# Patient Record
Sex: FEMALE | Race: WHITE | NOT HISPANIC OR LATINO | Employment: PART TIME | URBAN - METROPOLITAN AREA
[De-identification: names, ages, dates, MRNs, and addresses within clinical notes are randomized per-mention and may not be internally consistent; named-entity substitution may affect disease eponyms.]

---

## 2017-02-21 ENCOUNTER — APPOINTMENT (OUTPATIENT)
Dept: GENERAL RADIOLOGY | Facility: CLINIC | Age: 32
End: 2017-02-21
Attending: PHYSICIAN ASSISTANT
Payer: MEDICAID

## 2017-02-21 ENCOUNTER — HOSPITAL ENCOUNTER (EMERGENCY)
Facility: CLINIC | Age: 32
Discharge: HOME | End: 2017-02-21
Attending: PHYSICIAN ASSISTANT | Admitting: PHYSICIAN ASSISTANT
Payer: MEDICAID

## 2017-02-21 VITALS
BODY MASS INDEX: 27 KG/M2 | HEART RATE: 94 BPM | TEMPERATURE: 98.9 F | RESPIRATION RATE: 16 BRPM | WEIGHT: 168 LBS | OXYGEN SATURATION: 98 % | SYSTOLIC BLOOD PRESSURE: 129 MMHG | DIASTOLIC BLOOD PRESSURE: 107 MMHG | HEIGHT: 66 IN

## 2017-02-21 DIAGNOSIS — J06.9 VIRAL URI WITH COUGH: ICD-10-CM

## 2017-02-21 LAB
BASOPHILS # BLD AUTO: 0 10E9/L (ref 0–0.2)
BASOPHILS NFR BLD AUTO: 0.5 %
CRP SERPL-MCNC: <2.9 MG/L (ref 0–8)
DIFFERENTIAL METHOD BLD: NORMAL
EOSINOPHIL # BLD AUTO: 0.2 10E9/L (ref 0–0.7)
EOSINOPHIL NFR BLD AUTO: 2.2 %
ERYTHROCYTE [DISTWIDTH] IN BLOOD BY AUTOMATED COUNT: 13.4 % (ref 10–15)
FLUAV+FLUBV AG SPEC QL: NEGATIVE
FLUAV+FLUBV AG SPEC QL: NORMAL
HCT VFR BLD AUTO: 43.6 % (ref 35–47)
HGB BLD-MCNC: 14.8 G/DL (ref 11.7–15.7)
IMM GRANULOCYTES # BLD: 0 10E9/L (ref 0–0.4)
IMM GRANULOCYTES NFR BLD: 0.2 %
LYMPHOCYTES # BLD AUTO: 2.1 10E9/L (ref 0.8–5.3)
LYMPHOCYTES NFR BLD AUTO: 24.3 %
MCH RBC QN AUTO: 32.2 PG (ref 26.5–33)
MCHC RBC AUTO-ENTMCNC: 33.9 G/DL (ref 31.5–36.5)
MCV RBC AUTO: 95 FL (ref 78–100)
MONOCYTES # BLD AUTO: 0.6 10E9/L (ref 0–1.3)
MONOCYTES NFR BLD AUTO: 7.4 %
NEUTROPHILS # BLD AUTO: 5.5 10E9/L (ref 1.6–8.3)
NEUTROPHILS NFR BLD AUTO: 65.4 %
PLATELET # BLD AUTO: 307 10E9/L (ref 150–450)
RBC # BLD AUTO: 4.59 10E12/L (ref 3.8–5.2)
SPECIMEN SOURCE: NORMAL
WBC # BLD AUTO: 8.5 10E9/L (ref 4–11)

## 2017-02-21 PROCEDURE — 86140 C-REACTIVE PROTEIN: CPT | Performed by: PHYSICIAN ASSISTANT

## 2017-02-21 PROCEDURE — 71020 XR CHEST 2 VW: CPT | Mod: TC

## 2017-02-21 PROCEDURE — 85025 COMPLETE CBC W/AUTO DIFF WBC: CPT | Performed by: PHYSICIAN ASSISTANT

## 2017-02-21 PROCEDURE — 99284 EMERGENCY DEPT VISIT MOD MDM: CPT | Performed by: PHYSICIAN ASSISTANT

## 2017-02-21 PROCEDURE — 99284 EMERGENCY DEPT VISIT MOD MDM: CPT | Mod: 25

## 2017-02-21 PROCEDURE — 87804 INFLUENZA ASSAY W/OPTIC: CPT | Performed by: PHYSICIAN ASSISTANT

## 2017-02-21 RX ORDER — ALBUTEROL SULFATE 90 UG/1
2 AEROSOL, METERED RESPIRATORY (INHALATION) EVERY 4 HOURS PRN
Qty: 1 INHALER | Refills: 0 | Status: ON HOLD | OUTPATIENT
Start: 2017-02-21 | End: 2023-05-20

## 2017-02-21 RX ORDER — PREDNISONE 20 MG/1
TABLET ORAL
Qty: 10 TABLET | Refills: 0 | Status: SHIPPED | OUTPATIENT
Start: 2017-02-21 | End: 2022-01-19

## 2017-02-21 ASSESSMENT — ENCOUNTER SYMPTOMS
VOMITING: 1
FEVER: 0
COUGH: 1
HEADACHES: 1

## 2017-02-21 NOTE — ED AVS SNAPSHOT
[facility] Emergency Department    [address] [place] [address]    Phone:  [phone]    Fax:  [phone]                                       Heidi Su   MRN: [id number]    Department:  [facility] Emergency Department   Date of Visit:  2/21/2017           After Visit Summary Signature Page     I have received my discharge instructions, and my questions have been answered. I have discussed any challenges I see with this plan with the nurse or doctor.    ..........................................................................................................................................  Patient/Patient Representative Signature      ..........................................................................................................................................  Patient Representative Print Name and Relationship to Patient    ..................................................               ................................................  Date                                            Time    ..........................................................................................................................................  Reviewed by Signature/Title    ...................................................              ..............................................  Date                                                            Time

## 2017-02-21 NOTE — LETTER
[facility] EMERGENCY DEPARTMENT  [address] [place] [address]  [phone]    2017    Heidi Su  [address] [address] [address]  [phone] (home) NONE (work)    : 1985      To Whom it may concern:    Heidi Su was seen in our Emergency Department today, 2017.  I expect her condition to improve over the next few days.  Please excuse her from work on 2017 and 2017 due to this illness.        Sincerely,            Drake Cheek PA-C

## 2017-02-21 NOTE — ED NOTES
She was put on antibiotics for her cough 2 weeks ago and it's not getting better.  She vomits from phlegm when she lays down. She is also complaining of body aches.

## 2017-02-21 NOTE — ED PROVIDER NOTES
History     Chief Complaint   Patient presents with     Cough     The history is provided by the patient.     Heidi Su is a 31 year old female who presents to the ED with a cough. Patient states she has been sick for awhile. It first started on September, 5 months ago and resolved 2 weeks later on it's own. It came back again in November and resolved again after a couple of weeks. It came back a third time at the end of January and has continued for the last 4 weeks. She complains of a productive cough, coughing up yellow mucous. The cough is associated with vomiting. She has had hot and cold sweats but no fever noted. She also has body aches, headache and chest pain. She was seen in January and was tested for whooping cough that came back negative. She was then started on Zithromax with no improvement. She is a smoker, smoking 0.5 pack a day. 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 No recent prolonged travel. No fever noted. She did not get the influenza vaccine. She has a history of pneumonia.     Patient Active Problem List   Diagnosis     CARDIOVASCULAR SCREENING; LDL GOAL LESS THAN 160     Past Medical History   Diagnosis Date     Anxiety      Bronchitis      Depressive disorder      NO ACTIVE PROBLEMS      Ovarian cyst        Past Surgical History   Procedure Laterality Date     Gyn surgery       Dilation and curettage       Cystectomy ovarian benign       Hemorrhoid surgery       Dilation and curettage  2011       Family History   Problem Relation Age of Onset     Asthma Sister      DIABETES Sister 14     CEREBROVASCULAR DISEASE Maternal Grandfather      Breast Cancer Maternal Grandfather        Social History   Substance Use Topics     Smoking status: Current Every Day Smoker     Packs/day: 0.50     Smokeless tobacco: Never Used     Alcohol use Yes      Comment: 1-2x/month          There is no immunization history on file for this patient.     No Known  "Allergies    Current Outpatient Prescriptions   Medication Sig Dispense Refill     predniSONE (DELTASONE) 20 MG tablet Take two tablets (= 40mg) each day for 5 (five) days 10 tablet 0     albuterol (ALBUTEROL) 108 (90 BASE) MCG/ACT Inhaler Inhale 2 puffs into the lungs every 4 hours as needed for shortness of breath / dyspnea 1 Inhaler 0     ibuprofen (ADVIL,MOTRIN) 200 MG tablet Take 4 tablets (800 mg) by mouth every 8 hours as needed for pain (with food) 120 tablet      albuterol (2.5 MG/3ML) 0.083% nebulizer solution Take 3 mLs by nebulization every 4 hours as needed for shortness of breath / dyspnea (coughing jags). 1 Box 0     acetaminophen (TYLENOL) 500 MG tablet Take 1-2 tablets by mouth every 6 hours as needed.             I have reviewed the Medications, Allergies, Past Medical and Surgical History, and Social History in the Epic system.    Review of Systems   Constitutional: Negative for fever.        Positive for hot and cold sweats.   Respiratory: Positive for cough (yellow mucos ).         She is a smoker.    Cardiovascular: Positive for chest pain.   Gastrointestinal: Positive for vomiting (associated with the cough).   Musculoskeletal:        Positive for body aches.    Neurological: Positive for headaches.   All other systems reviewed and are negative.      Physical Exam   BP: (!) 129/107  Pulse: 94  Temp: 98.9  F (37.2  C)  Resp: 18  Height: 167.6 cm (5' 6\")  Weight: 76.2 kg (168 lb)  SpO2: 98 %  Physical Exam  Mildly ill appearing female in NAD who is active and non-toxic appearing.   Skin:  No rashes or lesions are noted on inspection of the torso, face, and upper extremities.   Head: Normocephalic, atraumatic, nontender to palpation  Eyes: PERRLA, conjunctiva and sclera clear  Ears: Bilateral TM's and canals are clear.  TM's translucent without erythema or effusion.  Nose: Nares normal and patent bilaterally.  Mucous membranes are non-erythematous and non-edematous.  No sinus tenderness.  Throat: " Mucous membranes are clear.  No tonsilar hypertrophy, exudate, or erythema.  Neck: Supple.  FROM without pain.  No adenopathy.  No thyromegaly.   Heart:  RRR with normal S1 and S2.  No S3 or S4.  No murmur, rub, gallop, or click.  PMI is nondisplaced.   Lungs:  CTA bilaterally without wheezes, rales, or rhonchi.  Good breath sounds heard throughout all lung fields.  Tympanitic to percussion with no areas of dullness.   Abdomen: Positive bowel sounds in all four quadrants.  No tenderness to palpation throughout.  No rebound and no guarding.  No hepatosplenomegaly.  No masses.   Extremities: extremities, peripheral pulses and reflexes normal, no edema, redness or tenderness in the calves or thighs, Tico's sign is negative, no sign of DVT.           ED Course     ED Course     Procedures             Critical Care time:  none         Results for orders placed or performed during the hospital encounter of 02/21/17   XR Chest 2 Views    Narrative    CHEST TWO VIEWS    2/21/2017 3:53 PM     HISTORY: Cough x four weeks.    COMPARISON: Chest x-rays dated 10/26/2012.    FINDINGS: The lungs are clear. No pleural effusions or pneumothorax.  Heart size and pulmonary vascularity are within normal limits. No  acute fracture.      Impression    IMPRESSION: No evidence of acute cardiopulmonary disease is seen.    KANDICE MCKEON MD   CBC with platelets differential   Result Value Ref Range    WBC 8.5 4.0 - 11.0 10e9/L    RBC Count 4.59 3.8 - 5.2 10e12/L    Hemoglobin 14.8 11.7 - 15.7 g/dL    Hematocrit 43.6 35.0 - 47.0 %    MCV 95 78 - 100 fl    MCH 32.2 26.5 - 33.0 pg    MCHC 33.9 31.5 - 36.5 g/dL    RDW 13.4 10.0 - 15.0 %    Platelet Count 307 150 - 450 10e9/L    Diff Method Automated Method     % Neutrophils 65.4 %    % Lymphocytes 24.3 %    % Monocytes 7.4 %    % Eosinophils 2.2 %    % Basophils 0.5 %    % Immature Granulocytes 0.2 %    Absolute Neutrophil 5.5 1.6 - 8.3 10e9/L    Absolute Lymphocytes 2.1 0.8 - 5.3 10e9/L     Absolute Monocytes 0.6 0.0 - 1.3 10e9/L    Absolute Eosinophils 0.2 0.0 - 0.7 10e9/L    Absolute Basophils 0.0 0.0 - 0.2 10e9/L    Abs Immature Granulocytes 0.0 0 - 0.4 10e9/L   CRP inflammation   Result Value Ref Range    CRP Inflammation <2.9 0.0 - 8.0 mg/L   Influenza A/B antigen   Result Value Ref Range    Influenza A/B Agn Specimen Nasopharyngeal     Influenza A Negative NEG    Influenza B  NEG     Negative   Test results must be correlated with clinical data. If necessary, results   should be confirmed by a molecular assay or viral culture.            Results for orders placed or performed during the hospital encounter of 02/21/17 (from the past 24 hour(s))   Influenza A/B antigen   Result Value Ref Range    Influenza A/B Agn Specimen Nasopharyngeal     Influenza A Negative NEG    Influenza B  NEG     Negative   Test results must be correlated with clinical data. If necessary, results   should be confirmed by a molecular assay or viral culture.     CBC with platelets differential   Result Value Ref Range    WBC 8.5 4.0 - 11.0 10e9/L    RBC Count 4.59 3.8 - 5.2 10e12/L    Hemoglobin 14.8 11.7 - 15.7 g/dL    Hematocrit 43.6 35.0 - 47.0 %    MCV 95 78 - 100 fl    MCH 32.2 26.5 - 33.0 pg    MCHC 33.9 31.5 - 36.5 g/dL    RDW 13.4 10.0 - 15.0 %    Platelet Count 307 150 - 450 10e9/L    Diff Method Automated Method     % Neutrophils 65.4 %    % Lymphocytes 24.3 %    % Monocytes 7.4 %    % Eosinophils 2.2 %    % Basophils 0.5 %    % Immature Granulocytes 0.2 %    Absolute Neutrophil 5.5 1.6 - 8.3 10e9/L    Absolute Lymphocytes 2.1 0.8 - 5.3 10e9/L    Absolute Monocytes 0.6 0.0 - 1.3 10e9/L    Absolute Eosinophils 0.2 0.0 - 0.7 10e9/L    Absolute Basophils 0.0 0.0 - 0.2 10e9/L    Abs Immature Granulocytes 0.0 0 - 0.4 10e9/L   CRP inflammation   Result Value Ref Range    CRP Inflammation <2.9 0.0 - 8.0 mg/L   XR Chest 2 Views    Narrative    CHEST TWO VIEWS    2/21/2017 3:53 PM     HISTORY: Cough x four  weeks.    COMPARISON: Chest x-rays dated 10/26/2012.    FINDINGS: The lungs are clear. No pleural effusions or pneumothorax.  Heart size and pulmonary vascularity are within normal limits. No  acute fracture.      Impression    IMPRESSION: No evidence of acute cardiopulmonary disease is seen.    KANDICE MCKEON MD     Medications - No data to display      Assessments & Plan (with Medical Decision Making)  Viral URI with cough   Heidi Su is a 31 year old female smoker who presents to ED with complaints of a cough, body aches, headache, vomiting and hot/cold sweats for the last 4 weeks, which is not improving with azithromycin therapy. She was started on Zithromax in January, 1 month ago with no improvement. Her vitals were stable upon arrival. Exam shows no significant abnormalities. Laboratory results show a normal white blood cell count with normal differential. CRP is undetectable. Influenza swabs negative. Chest x-ray negative for consolidation. Given her normal exam and normal lab/x-ray studies, I do not think antibiotic therapy is indicated. She has already undergone adequate antibiotic therapy for bronchitis/pneumonia in the form of azithromycin without any significant improvement in her symptoms. It appears that she has a viral etiology for her symptoms currently. Anti-inflammatory treatment with prednisone at 40 mg once daily for 5 days recommended. Albuterol MDI to be administered every 4 hours on a regular basis for 3 days and then p.r.n. afterwards discussed with her. She certainly could have an underlying reactive airway disease component to her recurrent upper respiratory symptoms given her smoking history. We had a long conversation regarding smoking cessation and the importance of this for her long-term health. Return for repeat evaluation if symptoms worsening or not improving. Possible side effects of the medication were discussed with her. The patient was in agreement with this plan and was  suitable for discharge.      I have reviewed the nursing notes.    I have reviewed the findings, diagnosis, plan and need for follow up with the patient.    Discharge Medication List as of 2/21/2017  5:28 PM      START taking these medications    Details   predniSONE (DELTASONE) 20 MG tablet Take two tablets (= 40mg) each day for 5 (five) days, Disp-10 tablet, R-0, E-Prescribe      albuterol (ALBUTEROL) 108 (90 BASE) MCG/ACT Inhaler Inhale 2 puffs into the lungs every 4 hours as needed for shortness of breath / dyspnea, Disp-1 Inhaler, R-0, E-Prescribe             Final diagnoses:   Viral URI with cough   This document serves as a record of services personally performed by Drake Cheek PA*. It was created on their behalf by Kat King, a trained medical scribe. The creation of this record is based on the provider's personal observations and the statements of the patient. This document has been checked and approved by the attending provider.   Note: Chart documentation done in part with Dragon Voice Recognition software. Although reviewed after completion, some word and grammatical errors may remain.        2/21/2017   Drake Cheek PA-C   [facility] EMERGENCY DEPARTMENT     Drake Cheek PA-C  02/22/17 0054

## 2017-02-21 NOTE — ED AVS SNAPSHOT
[facility] Emergency Department    [address] [place] [address]    Phone:  [phone]    Fax:  [phone]                                       Heidi Su   MRN: [id number]    Department:  [facility] Emergency Department   Date of Visit:  2/21/2017           Patient Information     Date Of Birth          1985        Your diagnoses for this visit were:     Viral URI with cough        You were seen by Drake Cheek PA-C.      Follow-up Information     Follow up with [facility] Emergency Department.    Specialty:  EMERGENCY MEDICINE    Why:  As needed, If symptoms worsen    Contact information:    [address] [place] [address] [phone]    Additional information:    From [address]: Exit at "[address] on south side of [place]. Turn right on [address]. Turn left at stoplight on [address]. [facility] will be in view two blocks ahead        Discharge Instructions       1.  Please set up an appointment with your new primary care provider in 6 days to recheck your cough if you are not improving with the prednisone and albuterol.  2.  Please stop smoking as this will help prevent future illnesses and help you recover from illnesses faster!!        24 Hour Appointment Hotline       To make an appointment at any [facility] clinic, call [phone] ([phone]). If you don't have a family doctor or clinic, we will help you find one. [facility] clinics are conveniently located to serve the needs of you and your family.             Review of your medicines      START taking        Dose / Directions Last dose taken    predniSONE 20 MG tablet   Commonly known as:  DELTASONE   Quantity:  10 tablet        Take two tablets (= 40mg) each day for 5 (five) days   Refills:  0          CONTINUE these medicines which may have CHANGED, or have new prescriptions. If we are uncertain of the size of tablets/capsules you have at home,  strength may be listed as something that might have changed.        Dose / Directions Last dose taken    * albuterol (2.5 MG/3ML) 0.083% neb solution   Dose:  1 ampule   What changed:  Another medication with the same name was added. Make sure you understand how and when to take each.   Quantity:  1 Box        Take 3 mLs by nebulization every 4 hours as needed for shortness of breath / dyspnea (coughing jags).   Refills:  0        * albuterol 108 (90 BASE) MCG/ACT Inhaler   Commonly known as:  albuterol   Dose:  2 puff   What changed:  You were already taking a medication with the same name, and this prescription was added. Make sure you understand how and when to take each.   Quantity:  1 Inhaler        Inhale 2 puffs into the lungs every 4 hours as needed for shortness of breath / dyspnea   Refills:  0        * Notice:  This list has 2 medication(s) that are the same as other medications prescribed for you. Read the directions carefully, and ask your doctor or other care provider to review them with you.      Our records show that you are taking the medicines listed below. If these are incorrect, please call your family doctor or clinic.        Dose / Directions Last dose taken    ibuprofen 200 MG tablet   Commonly known as:  ADVIL/MOTRIN   Dose:  800 mg   Quantity:  120 tablet        Take 4 tablets (800 mg) by mouth every 8 hours as needed for pain (with food)   Refills:  0        TYLENOL 500 MG tablet   Dose:  1-2 tablet   Generic drug:  acetaminophen        Take 1-2 tablets by mouth every 6 hours as needed.   Refills:  0                Prescriptions were sent or printed at these locations (2 Prescriptions)                   [place] [address]   [address] [facility] [address]    Telephone:  [phone]   Fax:  [phone]   Hours:                  E-Prescribed (2 of 2)         predniSONE (DELTASONE) 20 MG tablet               albuterol (ALBUTEROL) 108 (90 BASE) MCG/ACT  "Inhaler                Procedures and tests performed during your visit     CBC with platelets differential    CRP inflammation    Influenza A/B antigen    XR Chest 2 Views      Orders Needing Specimen Collection     None      Pending Results     Date and Time Order Name Status Description    2017 1521 XR Chest 2 Views Preliminary             Pending Culture Results     No orders found from 2017 to 2017.            Thank you for choosing [facility]       Thank you for choosing [facility] for your care. Our goal is always to provide you with excellent care. Hearing back from our patients is one way we can continue to improve our services. Please take a few minutes to complete the written survey that you may receive in the mail after you visit with us. Thank you!        [employer] Information     [employer] lets you send messages to your doctor, view your test results, renew your prescriptions, schedule appointments and more. To sign up, go to [email or web address] . Click on \"Log in\" on the left side of the screen, which will take you to the Welcome page. Then click on \"Sign up Now\" on the right side of the page.     You will be asked to enter the access code listed below, as well as some personal information. Please follow the directions to create your username and password.     Your access code is: [email or web address]  Expires: 2017  5:28 PM     Your access code will  in 90 days. If you need help or a new code, please call your [facility] clinic or [phone].        Care EveryWhere ID     This is your Care EveryWhere ID. This could be used by other organizations to access your [facility] medical records  [id number]        After Visit Summary       This is your record. Keep this with you and show to your community pharmacist(s) and doctor(s) at your next visit.                  "

## 2017-02-21 NOTE — DISCHARGE INSTRUCTIONS
1.  Please set up an appointment with your new primary care provider in 6 days to recheck your cough if you are not improving with the prednisone and albuterol.  2.  Please stop smoking as this will help prevent future illnesses and help you recover from illnesses faster!!

## 2017-03-28 ENCOUNTER — HOSPITAL ENCOUNTER (EMERGENCY)
Facility: CLINIC | Age: 32
Discharge: HOME | End: 2017-03-28
Attending: EMERGENCY MEDICINE | Admitting: EMERGENCY MEDICINE
Payer: MEDICAID

## 2017-03-28 VITALS
TEMPERATURE: 98.8 F | RESPIRATION RATE: 16 BRPM | BODY MASS INDEX: 27.48 KG/M2 | DIASTOLIC BLOOD PRESSURE: 86 MMHG | SYSTOLIC BLOOD PRESSURE: 144 MMHG | WEIGHT: 171 LBS | HEART RATE: 108 BPM | HEIGHT: 66 IN | OXYGEN SATURATION: 99 %

## 2017-03-28 DIAGNOSIS — M54.42 CHRONIC LEFT-SIDED LOW BACK PAIN WITH LEFT-SIDED SCIATICA: ICD-10-CM

## 2017-03-28 DIAGNOSIS — G89.29 CHRONIC LEFT-SIDED LOW BACK PAIN WITH LEFT-SIDED SCIATICA: ICD-10-CM

## 2017-03-28 PROCEDURE — 99284 EMERGENCY DEPT VISIT MOD MDM: CPT | Performed by: EMERGENCY MEDICINE

## 2017-03-28 PROCEDURE — 99282 EMERGENCY DEPT VISIT SF MDM: CPT

## 2017-03-28 ASSESSMENT — ENCOUNTER SYMPTOMS
ABDOMINAL PAIN: 0
CONSTIPATION: 0
BACK PAIN: 1
DYSURIA: 0

## 2017-03-28 NOTE — ED NOTES
Pt presents low back pain radiating into left posterior buttock and leg. Pt is 8 weeks pregnant. Discectomy on 12/19/2017 done at [facility].

## 2017-03-28 NOTE — ED AVS SNAPSHOT
" [facility] Emergency Department    [address] [place] [address]    Phone:  [phone]    Fax:  [phone]                                       Heidi Su   MRN: [id number]    Department:  [facility] Emergency Department   Date of Visit:  3/28/2017           Patient Information     Date Of Birth          1985        Your diagnoses for this visit were:     Chronic left-sided low back pain with left-sided sciatica        You were seen by Benjamín Pulliam MD.      Follow-up Information     Follow up with primary care.    Why:  later this week        Discharge Instructions         Neck/Back Pain: General    Both neck and back pain are usually caused by injury to the muscles or ligaments of the spine. Sometimes the disks that separate each bone of the spine may cause pain by pressing on a nearby nerve. Back and neck pain may appear after a sudden twisting/bending force (such as in a car accident), or sometimes after a simple awkward movement. In either case, muscle spasm is often present and adds to the pain.  Acute neck and back pain usually gets better in 1 to 2 weeks. Pain related to disk disease, arthritis in the spinal joints or spinal stenosis (narrowing of the spinal canal) can become chronic and last for months or years.  Back and neck pain are common problems. Most people feel better in 1 or 2 weeks, and most of the rest in 1 to 2 months. Most people can remain active.  Symptoms  People experience and describe pain differently.    Pain can be sharp, stabbing, shooting, aching, cramping, or burning    Movement, standing, bending, lifting, sitting, or walking may worsen the pain    Pain can be localized to one spot or area, or it can be more generalized    Pain can spread or radiate upwards, downwards, to the front, or go down your arms    Muscle spasm may occur.  Cause  Most of the time \"mechanical problems\" with the muscles or spine cause the pain. it is usually " caused by an injury, whether known or not, to the muscles or ligaments. While illnesses can cause back pain, it is usually not caused by a serious illness. Pain is usually related to physical activity, whether sports, exercise, work, or normal activity. Sometimes it can occur without an identifiable cause. This can happen simply by stretching or moving wrong, without noting pain at the time. Other causes include:    Overexertion, lifting, pushing, pulling incorrectly or too aggressively.    Sudden twisting, bending or stretching from an accident (car or fall), or accidental movement.    Poor posture    Poor conditioning, lack of regular exercise    Spinal disc disease or arthritis    Stress    Pregnancy, or illness like appendicitis, bladder or kidney infection, pelvic infections   Home care    For neck pain: Use a comfortable pillow that supports the head and keeps the spine in a neutral position. The position of the head should not be tilted forward or backward.    When in bed, try to find a position of comfort. A firm mattress is best. Try lying flat on your back with pillows under your knees. You can also try lying on your side with your knees bent up towards your chest and a pillow between your knees.    At first, do not try to stretch out the sore spots. If there is a strain, it is not like the good soreness you get after exercising without an injury. In this case, stretching may make it worse.    Avoid prolonged sitting, long car rides or travel. This puts more stress on the lower back than standing or walking.    During the first 24 to 72 hours after an injury, apply an ice pack to the painful area for 20 minutes and then remove it for 20 minutes over a period of 60 to 90 minutes or several times a day. As a safety precaution, do not use a heating pad at bedtime. Sleeping with a heating pad can lead to skin burns or tissue damage.    Ice and heat therapies can be alternated. Talk with your health care  provider about the best treatment for your back or neck pain.    Therapeutic massage can help relax the back and neck muscles without stretching them.    Be aware of safe lifting methods and do not lift anything over 15 pounds until all the pain is gone.  Medications  Talk to your health care provider before using medications, especially if you have other medical problems or are taking other medicines.    You may use acetaminophen (such as Tylenol) or ibuprofen (such as Advil or Motrin) to control pain, unless another pain medicine was prescribed. If you have chronic conditions like diabetes, liver or kidney disease, stomach ulcers,  gastrointestinal bleeding, or are taking blood thinner medications.    Be careful if you are given pain medicines, narcotics, or medication for muscle spasm. They can cause drowsiness, and can affect your coordination, reflexes, and judgment. Do not drive or operate heavy machinery.  Follow-up care  Follow up with your health care provider if your symptoms do not start to improve after one week. Physical therapy or further tests may be needed.  If X-rays were taken, they will be reviewed by a radiologist. You will be notified of any new findings that may affect your care.  Call 911  Seek emergency medical care if any of the following occur:    Trouble breathing    Confusion    Very drowsy or trouble awakening    Fainting or loss of consciousness    Rapid or very slow heart rate    Loss of bowel or bladder control  When to seek medical care  Get prompt medical attention if any of the following occur:    Pain becomes worse or spreads into your arms or legs    Weakness, numbness or pain in one or both arms or legs    Numbness in the groin area    Difficulty walking    Fever of 100.4 F (38 C) or higher, or as directed by your healthcare provider    8605-7986 [employer]. [address] [place] [address], [place] [address]. All rights reserved. This information is not intended as a  substitute for professional medical care. Always follow your healthcare professional's instructions.      Recommend Lidoderm patches 2x/day as needed for pain.      24 Hour Appointment Hotline       To make an appointment at any [facility], call [phone] ([phone]). If you don't have a family doctor or clinic, we will help you find one. [facility] clinics are conveniently located to serve the needs of you and your family.             Review of your medicines      Our records show that you are taking the medicines listed below. If these are incorrect, please call your family doctor or clinic.        Dose / Directions Last dose taken    * albuterol (2.5 MG/3ML) 0.083% neb solution   Dose:  1 ampule   Quantity:  1 Box        Take 3 mLs by nebulization every 4 hours as needed for shortness of breath / dyspnea (coughing jags).   Refills:  0        * albuterol 108 (90 BASE) MCG/ACT Inhaler   Commonly known as:  albuterol   Dose:  2 puff   Quantity:  1 Inhaler        Inhale 2 puffs into the lungs every 4 hours as needed for shortness of breath / dyspnea   Refills:  0        ibuprofen 200 MG tablet   Commonly known as:  ADVIL/MOTRIN   Dose:  800 mg   Quantity:  120 tablet        Take 4 tablets (800 mg) by mouth every 8 hours as needed for pain (with food)   Refills:  0        predniSONE 20 MG tablet   Commonly known as:  DELTASONE   Quantity:  10 tablet        Take two tablets (= 40mg) each day for 5 (five) days   Refills:  0        TYLENOL 500 MG tablet   Dose:  1-2 tablet   Generic drug:  acetaminophen        Take 1-2 tablets by mouth every 6 hours as needed.   Refills:  0        * Notice:  This list has 2 medication(s) that are the same as other medications prescribed for you. Read the directions carefully, and ask your doctor or other care provider to review them with you.            Orders Needing Specimen Collection     None      Pending Results     No orders found from 3/26/2017 to 3/29/2017.           "  Pending Culture Results     No orders found from 3/26/2017 to 3/29/2017.            Thank you for choosing [facility]       Thank you for choosing [facility] for your care. Our goal is always to provide you with excellent care. Hearing back from our patients is one way we can continue to improve our services. Please take a few minutes to complete the written survey that you may receive in the mail after you visit with us. Thank you!        [email or web address] Information     [employer] lets you send messages to your doctor, view your test results, renew your prescriptions, schedule appointments and more. To sign up, go to [email or web address] . Click on \"Log in\" on the left side of the screen, which will take you to the Welcome page. Then click on \"Sign up Now\" on the right side of the page.     You will be asked to enter the access code listed below, as well as some personal information. Please follow the directions to create your username and password.     Your access code is: [email or web address]  Expires: 2017  6:28 PM     Your access code will  in 90 days. If you need help or a new code, please call your [facility] clinic or [phone].        Care EveryWhere ID     This is your Care EveryWhere ID. This could be used by other organizations to access your [facility] medical records  [id number]        After Visit Summary       This is your record. Keep this with you and show to your community pharmacist(s) and doctor(s) at your next visit.                  "

## 2017-03-28 NOTE — ED PROVIDER NOTES
History     Chief Complaint   Patient presents with     Back Pain     Radiating down left buttocks and left leg. L5 surgery two months ago.      The history is provided by the patient.     Heidi Su is a 31 year old female who presents to the ED for back pain. Patient states that the pain radiates from her lower back into her left buttock. She reports that she had a Discectomy on 12/19/17. She also had a L5 surgery two months ago. This pain has been gradually worsening over the last week and is worse when lying down. She is 8 weeks pregnant but does states during her first pregnancy she had back problems. Denies any trauma to the area. Denies vaginal bleeding, abdominal pain, bowel or bladder troubles.     Patient Active Problem List   Diagnosis     CARDIOVASCULAR SCREENING; LDL GOAL LESS THAN 160     Past Medical History:   Diagnosis Date     Anxiety      Bronchitis      Depressive disorder      NO ACTIVE PROBLEMS      Ovarian cyst        Past Surgical History:   Procedure Laterality Date     CYSTECTOMY OVARIAN BENIGN       DILATION AND CURETTAGE       DILATION AND CURETTAGE  2011     GYN SURGERY       HEMORRHOID SURGERY         Family History   Problem Relation Age of Onset     Asthma Sister      DIABETES Sister 14     CEREBROVASCULAR DISEASE Maternal Grandfather      Breast Cancer Maternal Grandfather        Social History   Substance Use Topics     Smoking status: Current Every Day Smoker     Packs/day: 0.50     Smokeless tobacco: Never Used     Alcohol use Yes      Comment: 1-2x/month          There is no immunization history on file for this patient.       Allergies   Allergen Reactions     No Known Allergies        Current Outpatient Prescriptions   Medication Sig Dispense Refill     acetaminophen (TYLENOL) 500 MG tablet Take 1-2 tablets by mouth every 6 hours as needed.       predniSONE (DELTASONE) 20 MG tablet Take two tablets (= 40mg) each day for 5 (five) days 10 tablet 0     albuterol (ALBUTEROL)  "108 (90 BASE) MCG/ACT Inhaler Inhale 2 puffs into the lungs every 4 hours as needed for shortness of breath / dyspnea 1 Inhaler 0     ibuprofen (ADVIL,MOTRIN) 200 MG tablet Take 4 tablets (800 mg) by mouth every 8 hours as needed for pain (with food) 120 tablet      albuterol (2.5 MG/3ML) 0.083% nebulizer solution Take 3 mLs by nebulization every 4 hours as needed for shortness of breath / dyspnea (coughing jags). 1 Box 0         I have reviewed the Medications, Allergies, Past Medical and Surgical History, and Social History in the Epic system.    Review of Systems   Gastrointestinal: Negative for abdominal pain and constipation.   Genitourinary: Negative for dysuria and vaginal bleeding.   Musculoskeletal: Positive for back pain (low back into left posterior buttock).   All other systems reviewed and are negative.      Physical Exam   BP: 144/86  Pulse: 108  Temp: 98.8  F (37.1  C)  Resp: 16  Height: 167.6 cm (5' 6\")  Weight: 77.6 kg (171 lb)  SpO2: 99 %    Physical Exam   Constitutional: She is oriented to person, place, and time. She appears well-developed and well-nourished. No distress.   HENT:   Head: Normocephalic and atraumatic.   Eyes: Conjunctivae and EOM are normal. Pupils are equal, round, and reactive to light.   Neck: Normal range of motion.   Cardiovascular: Normal rate, regular rhythm, normal heart sounds and normal pulses.    No murmur heard.  Pulmonary/Chest: Effort normal and breath sounds normal. No respiratory distress. She has no decreased breath sounds. She has no wheezes. She has no rhonchi. She has no rales.   Abdominal: Soft. Bowel sounds are normal. She exhibits no distension. There is no rebound.   Musculoskeletal:        Lumbar back: She exhibits tenderness (left of incision).   Has midline lumbar incision. No signs of erythema, swelling or signs of infection.   Neurological: She is alert and oriented to person, place, and time. GCS eye subscore is 4. GCS verbal subscore is 5. GCS motor " subscore is 6.   Strength normal in lower extremities   Skin: Skin is warm and dry. No rash noted. No pallor.   Psychiatric: She has a normal mood and affect. Her behavior is normal.   Nursing note and vitals reviewed.      ED Course     ED Course     Procedures             Critical Care time:  none               Labs Ordered and Resulted from Time of ED Arrival Up to the Time of Departure from the ED - No data to display    No results found for this or any previous visit (from the past 24 hour(s)).    Medications - No data to display    Assessments & Plan (with Medical Decision Making)  31-year-old female with a history of chronic back pain and recurrent surgery who presents with an acute exacerbation of pain.  She is currently 8 weeks pregnant by report without vaginal bleeding or abdominal pain.  She is currently using Tylenol for pain.  She has no primary care physician.  I let her know I was not comfortable prescribing more narcotics for her back pain in light of her history and current early pregnancy.  I recommended Lidoderm patches and continuing with Tylenol as needed.  She should establish primary care for further follow-up.       I have reviewed the nursing notes.    I have reviewed the findings, diagnosis, plan and need for follow up with the patient.    Discharge Medication List as of 3/28/2017  3:22 PM          Final diagnoses:   Chronic left-sided low back pain with left-sided sciatica     This document serves as a record of services personally performed by Benjamín Pulliam MD. It was created on their behalf by Vonnie Jorge, a trained medical scribe. The creation of this record is based on the provider's personal observations and the statements of the patient. This document has been checked and approved by the attending provider.    Note: Chart documentation done in part with Dragon Voice Recognition software. Although reviewed after completion, some word and grammatical errors may  remain.        3/28/2017   [facility] EMERGENCY DEPARTMENT     Benjamín Pulliam MD  03/28/17 2020

## 2017-03-28 NOTE — ED AVS SNAPSHOT
[facility] Emergency Department    [address] [place] [address]    Phone:  [phone]    Fax:  [phone]                                       Heidi Su   MRN: [id number]    Department:  [facility] Emergency Department   Date of Visit:  3/28/2017           After Visit Summary Signature Page     I have received my discharge instructions, and my questions have been answered. I have discussed any challenges I see with this plan with the nurse or doctor.    ..........................................................................................................................................  Patient/Patient Representative Signature      ..........................................................................................................................................  Patient Representative Print Name and Relationship to Patient    ..................................................               ................................................  Date                                            Time    ..........................................................................................................................................  Reviewed by Signature/Title    ...................................................              ..............................................  Date                                                            Time

## 2017-03-28 NOTE — DISCHARGE INSTRUCTIONS
"  Neck/Back Pain: General    Both neck and back pain are usually caused by injury to the muscles or ligaments of the spine. Sometimes the disks that separate each bone of the spine may cause pain by pressing on a nearby nerve. Back and neck pain may appear after a sudden twisting/bending force (such as in a car accident), or sometimes after a simple awkward movement. In either case, muscle spasm is often present and adds to the pain.  Acute neck and back pain usually gets better in 1 to 2 weeks. Pain related to disk disease, arthritis in the spinal joints or spinal stenosis (narrowing of the spinal canal) can become chronic and last for months or years.  Back and neck pain are common problems. Most people feel better in 1 or 2 weeks, and most of the rest in 1 to 2 months. Most people can remain active.  Symptoms  People experience and describe pain differently.    Pain can be sharp, stabbing, shooting, aching, cramping, or burning    Movement, standing, bending, lifting, sitting, or walking may worsen the pain    Pain can be localized to one spot or area, or it can be more generalized    Pain can spread or radiate upwards, downwards, to the front, or go down your arms    Muscle spasm may occur.  Cause  Most of the time \"mechanical problems\" with the muscles or spine cause the pain. it is usually caused by an injury, whether known or not, to the muscles or ligaments. While illnesses can cause back pain, it is usually not caused by a serious illness. Pain is usually related to physical activity, whether sports, exercise, work, or normal activity. Sometimes it can occur without an identifiable cause. This can happen simply by stretching or moving wrong, without noting pain at the time. Other causes include:    Overexertion, lifting, pushing, pulling incorrectly or too aggressively.    Sudden twisting, bending or stretching from an accident (car or fall), or accidental movement.    Poor posture    Poor conditioning, " lack of regular exercise    Spinal disc disease or arthritis    Stress    Pregnancy, or illness like appendicitis, bladder or kidney infection, pelvic infections   Home care    For neck pain: Use a comfortable pillow that supports the head and keeps the spine in a neutral position. The position of the head should not be tilted forward or backward.    When in bed, try to find a position of comfort. A firm mattress is best. Try lying flat on your back with pillows under your knees. You can also try lying on your side with your knees bent up towards your chest and a pillow between your knees.    At first, do not try to stretch out the sore spots. If there is a strain, it is not like the good soreness you get after exercising without an injury. In this case, stretching may make it worse.    Avoid prolonged sitting, long car rides or travel. This puts more stress on the lower back than standing or walking.    During the first 24 to 72 hours after an injury, apply an ice pack to the painful area for 20 minutes and then remove it for 20 minutes over a period of 60 to 90 minutes or several times a day. As a safety precaution, do not use a heating pad at bedtime. Sleeping with a heating pad can lead to skin burns or tissue damage.    Ice and heat therapies can be alternated. Talk with your health care provider about the best treatment for your back or neck pain.    Therapeutic massage can help relax the back and neck muscles without stretching them.    Be aware of safe lifting methods and do not lift anything over 15 pounds until all the pain is gone.  Medications  Talk to your health care provider before using medications, especially if you have other medical problems or are taking other medicines.    You may use acetaminophen (such as Tylenol) or ibuprofen (such as Advil or Motrin) to control pain, unless another pain medicine was prescribed. If you have chronic conditions like diabetes, liver or kidney disease, stomach  ulcers,  gastrointestinal bleeding, or are taking blood thinner medications.    Be careful if you are given pain medicines, narcotics, or medication for muscle spasm. They can cause drowsiness, and can affect your coordination, reflexes, and judgment. Do not drive or operate heavy machinery.  Follow-up care  Follow up with your health care provider if your symptoms do not start to improve after one week. Physical therapy or further tests may be needed.  If X-rays were taken, they will be reviewed by a radiologist. You will be notified of any new findings that may affect your care.  Call 911  Seek emergency medical care if any of the following occur:    Trouble breathing    Confusion    Very drowsy or trouble awakening    Fainting or loss of consciousness    Rapid or very slow heart rate    Loss of bowel or bladder control  When to seek medical care  Get prompt medical attention if any of the following occur:    Pain becomes worse or spreads into your arms or legs    Weakness, numbness or pain in one or both arms or legs    Numbness in the groin area    Difficulty walking    Fever of 100.4 F (38 C) or higher, or as directed by your healthcare provider    9874-9297 [employer]. [address] [place] [address]. All rights reserved. This information is not intended as a substitute for professional medical care. Always follow your healthcare professional's instructions.      Recommend Lidoderm patches 2x/day as needed for pain.

## 2019-04-29 ENCOUNTER — HOSPITAL ENCOUNTER (EMERGENCY)
Facility: CLINIC | Age: 34
Discharge: HOME | End: 2019-04-29
Attending: EMERGENCY MEDICINE | Admitting: EMERGENCY MEDICINE

## 2019-04-29 VITALS
OXYGEN SATURATION: 97 % | WEIGHT: 154 LBS | HEIGHT: 65 IN | TEMPERATURE: 98 F | RESPIRATION RATE: 18 BRPM | SYSTOLIC BLOOD PRESSURE: 113 MMHG | DIASTOLIC BLOOD PRESSURE: 67 MMHG | BODY MASS INDEX: 25.66 KG/M2 | HEART RATE: 89 BPM

## 2019-04-29 DIAGNOSIS — R73.9 HYPERGLYCEMIA: ICD-10-CM

## 2019-04-29 LAB
ALBUMIN SERPL-MCNC: 4.1 G/DL (ref 3.4–5)
ALBUMIN UR-MCNC: NEGATIVE MG/DL
ALP SERPL-CCNC: 95 U/L (ref 40–150)
ALT SERPL W P-5'-P-CCNC: 26 U/L (ref 0–50)
ANION GAP SERPL CALCULATED.3IONS-SCNC: 11 MMOL/L (ref 3–14)
APPEARANCE UR: ABNORMAL
AST SERPL W P-5'-P-CCNC: 9 U/L (ref 0–45)
BACTERIA #/AREA URNS HPF: ABNORMAL /HPF
BASE DEFICIT BLDV-SCNC: 1.2 MMOL/L
BASOPHILS # BLD AUTO: 0.1 10E9/L (ref 0–0.2)
BASOPHILS NFR BLD AUTO: 0.5 %
BILIRUB SERPL-MCNC: 0.2 MG/DL (ref 0.2–1.3)
BILIRUB UR QL STRIP: NEGATIVE
BUN SERPL-MCNC: 19 MG/DL (ref 7–30)
CALCIUM SERPL-MCNC: 9.7 MG/DL (ref 8.5–10.1)
CHLORIDE SERPL-SCNC: 100 MMOL/L (ref 94–109)
CO2 SERPL-SCNC: 23 MMOL/L (ref 20–32)
COLOR UR AUTO: YELLOW
CREAT SERPL-MCNC: 0.66 MG/DL (ref 0.52–1.04)
DIFFERENTIAL METHOD BLD: NORMAL
EOSINOPHIL NFR BLD AUTO: 2.8 %
ERYTHROCYTE [DISTWIDTH] IN BLOOD BY AUTOMATED COUNT: 12.5 % (ref 10–15)
GFR SERPL CREATININE-BSD FRML MDRD: >90 ML/MIN/{1.73_M2}
GLUCOSE BLDC GLUCOMTR-MCNC: 188 MG/DL (ref 70–99)
GLUCOSE BLDC GLUCOMTR-MCNC: 257 MG/DL (ref 70–99)
GLUCOSE BLDC GLUCOMTR-MCNC: 344 MG/DL (ref 70–99)
GLUCOSE BLDC GLUCOMTR-MCNC: 41 MG/DL (ref 70–99)
GLUCOSE SERPL-MCNC: 171 MG/DL (ref 70–99)
GLUCOSE UR STRIP-MCNC: >499 MG/DL
HCG UR QL: NEGATIVE
HCO3 BLDV-SCNC: 25 MMOL/L (ref 21–28)
HCT VFR BLD AUTO: 44.7 % (ref 35–47)
HGB BLD-MCNC: 15.5 G/DL (ref 11.7–15.7)
HGB UR QL STRIP: NEGATIVE
IMM GRANULOCYTES # BLD: 0 10E9/L (ref 0–0.4)
IMM GRANULOCYTES NFR BLD: 0.2 %
KETONES UR STRIP-MCNC: 5 MG/DL
LACTATE BLD-SCNC: 2.8 MMOL/L (ref 0.7–2)
LEUKOCYTE ESTERASE UR QL STRIP: ABNORMAL
LYMPHOCYTES # BLD AUTO: 2.9 10E9/L (ref 0.8–5.3)
LYMPHOCYTES NFR BLD AUTO: 27.8 %
MCH RBC QN AUTO: 32.5 PG (ref 26.5–33)
MCHC RBC AUTO-ENTMCNC: 34.7 G/DL (ref 31.5–36.5)
MCV RBC AUTO: 94 FL (ref 78–100)
MONOCYTES # BLD AUTO: 0.6 10E9/L (ref 0–1.3)
MONOCYTES NFR BLD AUTO: 6 %
NEUTROPHILS # BLD AUTO: 6.5 10E9/L (ref 1.6–8.3)
NEUTROPHILS NFR BLD AUTO: 62.7 %
NITRATE UR QL: NEGATIVE
NRBC # BLD AUTO: 0 10*3/UL
NRBC BLD AUTO-RTO: 0 /100
O2/TOTAL GAS SETTING VFR VENT: 21 %
PCO2 BLDV: 44 MM HG (ref 40–50)
PH BLDV: 7.36 PH (ref 7.32–7.43)
PH UR STRIP: 5 PH (ref 5–7)
PLATELET # BLD AUTO: 385 10E9/L (ref 150–450)
PO2 BLDV: 35 MM HG (ref 25–47)
POTASSIUM SERPL-SCNC: 3.6 MMOL/L (ref 3.4–5.3)
PROT SERPL-MCNC: 8.5 G/DL (ref 6.8–8.8)
RBC # BLD AUTO: 4.77 10E12/L (ref 3.8–5.2)
RBC #/AREA URNS AUTO: 11 /HPF (ref 0–2)
SODIUM SERPL-SCNC: 134 MMOL/L (ref 133–144)
SOURCE: ABNORMAL
SP GR UR STRIP: 1.03 (ref 1–1.03)
SQUAMOUS #/AREA URNS AUTO: 15 /HPF (ref 0–1)
UROBILINOGEN UR STRIP-MCNC: 0 MG/DL (ref 0–2)
WBC # BLD AUTO: 10.3 10E9/L (ref 4–11)
WBC #/AREA URNS AUTO: 6 /HPF (ref 0–5)

## 2019-04-29 PROCEDURE — 83605 ASSAY OF LACTIC ACID: CPT | Performed by: EMERGENCY MEDICINE

## 2019-04-29 PROCEDURE — 25000128 H RX IP 250 OP 636: Performed by: EMERGENCY MEDICINE

## 2019-04-29 PROCEDURE — 00000146 ZZHCL STATISTIC GLUCOSE BY METER IP

## 2019-04-29 PROCEDURE — 81001 URINALYSIS AUTO W/SCOPE: CPT | Performed by: EMERGENCY MEDICINE

## 2019-04-29 PROCEDURE — 81025 URINE PREGNANCY TEST: CPT | Performed by: EMERGENCY MEDICINE

## 2019-04-29 PROCEDURE — 82803 BLOOD GASES ANY COMBINATION: CPT | Performed by: EMERGENCY MEDICINE

## 2019-04-29 PROCEDURE — 80053 COMPREHEN METABOLIC PANEL: CPT | Performed by: EMERGENCY MEDICINE

## 2019-04-29 PROCEDURE — 85025 COMPLETE CBC W/AUTO DIFF WBC: CPT | Performed by: EMERGENCY MEDICINE

## 2019-04-29 PROCEDURE — 99283 EMERGENCY DEPT VISIT LOW MDM: CPT | Mod: 25 | Performed by: EMERGENCY MEDICINE

## 2019-04-29 PROCEDURE — 87086 URINE CULTURE/COLONY COUNT: CPT | Performed by: EMERGENCY MEDICINE

## 2019-04-29 PROCEDURE — 96360 HYDRATION IV INFUSION INIT: CPT | Performed by: EMERGENCY MEDICINE

## 2019-04-29 PROCEDURE — 99283 EMERGENCY DEPT VISIT LOW MDM: CPT | Mod: Z6 | Performed by: EMERGENCY MEDICINE

## 2019-04-29 PROCEDURE — 96361 HYDRATE IV INFUSION ADD-ON: CPT | Performed by: EMERGENCY MEDICINE

## 2019-04-29 RX ORDER — SODIUM CHLORIDE 9 MG/ML
1000 INJECTION, SOLUTION INTRAVENOUS CONTINUOUS
Status: DISCONTINUED | OUTPATIENT
Start: 2019-04-29 | End: 2019-04-29 | Stop reason: HOSPADM

## 2019-04-29 RX ADMIN — SODIUM CHLORIDE 1000 ML: 9 INJECTION, SOLUTION INTRAVENOUS at 12:32

## 2019-04-29 RX ADMIN — SODIUM CHLORIDE 1000 ML: 9 INJECTION, SOLUTION INTRAVENOUS at 14:00

## 2019-04-29 ASSESSMENT — MIFFLIN-ST. JEOR: SCORE: 1414.42

## 2019-04-29 NOTE — ED AVS SNAPSHOT
[facility] Emergency Department  [address] [place] [address]  Phone:  [phone]  Fax:  [phone]                                    Heidi Su   MRN: [id number]    Department:  [facility] Emergency Department   Date of Visit:  4/29/2019           After Visit Summary Signature Page    I have received my discharge instructions, and my questions have been answered. I have discussed any challenges I see with this plan with the nurse or doctor.    ..........................................................................................................................................  Patient/Patient Representative Signature      ..........................................................................................................................................  Patient Representative Print Name and Relationship to Patient    ..................................................               ................................................  Date                                   Time    ..........................................................................................................................................  Reviewed by Signature/Title    ...................................................              ..............................................  Date                                               Time          22EPIC Rev 08/18

## 2019-04-29 NOTE — ED TRIAGE NOTES
"She was feeling funny at work so checked her blood sugar and it was 527 at 0940  She gave herself 20 units of novalog.  A 1045 she had a \"high\" reading on her glucometer so gave herself another 20  Units of novalog.  "

## 2019-04-29 NOTE — ED NOTES
Pts BS checked per pt request d/t feeling hypoglycemic. Pts BS was 41. 2 OJs given along with PB toast and a carton of milk. Pt was feeling shaky and lethargic. Will recheck sugar shortly.

## 2019-04-30 LAB
BACTERIA SPEC CULT: NORMAL
Lab: NORMAL
SPECIMEN SOURCE: NORMAL

## 2020-10-11 ENCOUNTER — HOSPITAL ENCOUNTER (EMERGENCY)
Facility: CLINIC | Age: 35
Discharge: HOME | End: 2020-10-12
Attending: FAMILY MEDICINE | Admitting: FAMILY MEDICINE
Payer: COMMERCIAL

## 2020-10-11 DIAGNOSIS — R11.0 NAUSEA: ICD-10-CM

## 2020-10-11 DIAGNOSIS — R19.7 DIARRHEA, UNSPECIFIED TYPE: ICD-10-CM

## 2020-10-11 PROCEDURE — 99284 EMERGENCY DEPT VISIT MOD MDM: CPT | Mod: 25 | Performed by: FAMILY MEDICINE

## 2020-10-11 PROCEDURE — 96361 HYDRATE IV INFUSION ADD-ON: CPT | Performed by: FAMILY MEDICINE

## 2020-10-11 PROCEDURE — 96374 THER/PROPH/DIAG INJ IV PUSH: CPT | Performed by: FAMILY MEDICINE

## 2020-10-11 PROCEDURE — 99284 EMERGENCY DEPT VISIT MOD MDM: CPT | Performed by: FAMILY MEDICINE

## 2020-10-11 PROCEDURE — C9803 HOPD COVID-19 SPEC COLLECT: HCPCS | Performed by: FAMILY MEDICINE

## 2020-10-11 NOTE — LETTER
October 12, 2020      To Whom It May Concern:      Heidi Su was seen in our Emergency Department today, 10/12/20.   She will need to self quarantine at home for several days until her COVID-19 test comes back negative.  If it is positive, she will need to quarantine for a longer period of time.      Sincerely,        Patricio Campbell MD

## 2020-10-11 NOTE — ED AVS SNAPSHOT
[facility] Emergency Dept  [address] [place] [address]  Phone: [phone]  Fax: [phone]                                    Heidi Su   MRN: [id number]    Department: [facility] Emergency Dept   Date of Visit: 10/11/2020           After Visit Summary Signature Page    I have received my discharge instructions, and my questions have been answered. I have discussed any challenges I see with this plan with the nurse or doctor.    ..........................................................................................................................................  Patient/Patient Representative Signature      ..........................................................................................................................................  Patient Representative Print Name and Relationship to Patient    ..................................................               ................................................  Date                                   Time    ..........................................................................................................................................  Reviewed by Signature/Title    ...................................................              ..............................................  Date                                               Time          22EPIC Rev 08/18

## 2020-10-12 VITALS
RESPIRATION RATE: 18 BRPM | TEMPERATURE: 98.4 F | DIASTOLIC BLOOD PRESSURE: 72 MMHG | SYSTOLIC BLOOD PRESSURE: 104 MMHG | OXYGEN SATURATION: 99 % | HEART RATE: 77 BPM

## 2020-10-12 LAB
ALBUMIN UR-MCNC: NEGATIVE MG/DL
ANION GAP SERPL CALCULATED.3IONS-SCNC: 6 MMOL/L (ref 3–14)
APPEARANCE UR: CLEAR
BASOPHILS # BLD AUTO: 0.1 10E9/L (ref 0–0.2)
BASOPHILS NFR BLD AUTO: 0.7 %
BILIRUB UR QL STRIP: NEGATIVE
BUN SERPL-MCNC: 22 MG/DL (ref 7–30)
CALCIUM SERPL-MCNC: 9.7 MG/DL (ref 8.5–10.1)
CHLORIDE SERPL-SCNC: 105 MMOL/L (ref 94–109)
CO2 SERPL-SCNC: 24 MMOL/L (ref 20–32)
COLOR UR AUTO: ABNORMAL
CREAT SERPL-MCNC: 0.66 MG/DL (ref 0.52–1.04)
DIFFERENTIAL METHOD BLD: NORMAL
EOSINOPHIL NFR BLD AUTO: 2.5 %
ERYTHROCYTE [DISTWIDTH] IN BLOOD BY AUTOMATED COUNT: 12.1 % (ref 10–15)
GFR SERPL CREATININE-BSD FRML MDRD: >90 ML/MIN/{1.73_M2}
GLUCOSE SERPL-MCNC: 137 MG/DL (ref 70–99)
GLUCOSE UR STRIP-MCNC: NEGATIVE MG/DL
HCG UR QL: NEGATIVE
HCT VFR BLD AUTO: 42.3 % (ref 35–47)
HGB BLD-MCNC: 14.4 G/DL (ref 11.7–15.7)
HGB UR QL STRIP: NEGATIVE
IMM GRANULOCYTES # BLD: 0 10E9/L (ref 0–0.4)
IMM GRANULOCYTES NFR BLD: 0.2 %
KETONES UR STRIP-MCNC: NEGATIVE MG/DL
LEUKOCYTE ESTERASE UR QL STRIP: NEGATIVE
LYMPHOCYTES # BLD AUTO: 2.9 10E9/L (ref 0.8–5.3)
LYMPHOCYTES NFR BLD AUTO: 33.8 %
MCH RBC QN AUTO: 32.4 PG (ref 26.5–33)
MCHC RBC AUTO-ENTMCNC: 34 G/DL (ref 31.5–36.5)
MCV RBC AUTO: 95 FL (ref 78–100)
MONOCYTES # BLD AUTO: 0.6 10E9/L (ref 0–1.3)
MONOCYTES NFR BLD AUTO: 7.3 %
MUCOUS THREADS #/AREA URNS LPF: PRESENT /LPF
NEUTROPHILS # BLD AUTO: 4.7 10E9/L (ref 1.6–8.3)
NEUTROPHILS NFR BLD AUTO: 55.5 %
NITRATE UR QL: NEGATIVE
NRBC # BLD AUTO: 0 10*3/UL
NRBC BLD AUTO-RTO: 0 /100
PH UR STRIP: 5 PH (ref 5–7)
PLATELET # BLD AUTO: 335 10E9/L (ref 150–450)
POTASSIUM SERPL-SCNC: 3.8 MMOL/L (ref 3.4–5.3)
RBC # BLD AUTO: 4.44 10E12/L (ref 3.8–5.2)
RBC #/AREA URNS AUTO: <1 /HPF (ref 0–2)
SARS-COV-2 RNA SPEC QL NAA+PROBE: NOT DETECTED
SODIUM SERPL-SCNC: 135 MMOL/L (ref 133–144)
SOURCE: ABNORMAL
SP GR UR STRIP: 1.01 (ref 1–1.03)
SPECIMEN SOURCE: NORMAL
SQUAMOUS #/AREA URNS AUTO: 1 /HPF (ref 0–1)
UROBILINOGEN UR STRIP-MCNC: 0 MG/DL (ref 0–2)
WBC # BLD AUTO: 8.5 10E9/L (ref 4–11)
WBC #/AREA URNS AUTO: <1 /HPF (ref 0–5)

## 2020-10-12 PROCEDURE — 80048 BASIC METABOLIC PNL TOTAL CA: CPT | Performed by: FAMILY MEDICINE

## 2020-10-12 PROCEDURE — 258N000003 HC RX IP 258 OP 636: Performed by: FAMILY MEDICINE

## 2020-10-12 PROCEDURE — 85025 COMPLETE CBC W/AUTO DIFF WBC: CPT | Performed by: FAMILY MEDICINE

## 2020-10-12 PROCEDURE — 81001 URINALYSIS AUTO W/SCOPE: CPT | Performed by: FAMILY MEDICINE

## 2020-10-12 PROCEDURE — 96374 THER/PROPH/DIAG INJ IV PUSH: CPT | Performed by: FAMILY MEDICINE

## 2020-10-12 PROCEDURE — 81025 URINE PREGNANCY TEST: CPT | Performed by: FAMILY MEDICINE

## 2020-10-12 PROCEDURE — 96361 HYDRATE IV INFUSION ADD-ON: CPT | Performed by: FAMILY MEDICINE

## 2020-10-12 PROCEDURE — 250N000011 HC RX IP 250 OP 636: Performed by: FAMILY MEDICINE

## 2020-10-12 PROCEDURE — U0003 INFECTIOUS AGENT DETECTION BY NUCLEIC ACID (DNA OR RNA); SEVERE ACUTE RESPIRATORY SYNDROME CORONAVIRUS 2 (SARS-COV-2) (CORONAVIRUS DISEASE [COVID-19]), AMPLIFIED PROBE TECHNIQUE, MAKING USE OF HIGH THROUGHPUT TECHNOLOGIES AS DESCRIBED BY CMS-2020-01-R: HCPCS | Performed by: FAMILY MEDICINE

## 2020-10-12 RX ORDER — ONDANSETRON 4 MG/1
4 TABLET, ORALLY DISINTEGRATING ORAL EVERY 6 HOURS PRN
Status: DISCONTINUED | OUTPATIENT
Start: 2020-10-12 | End: 2020-10-12 | Stop reason: HOSPADM

## 2020-10-12 RX ORDER — SODIUM CHLORIDE 9 MG/ML
INJECTION, SOLUTION INTRAVENOUS CONTINUOUS
Status: DISCONTINUED | OUTPATIENT
Start: 2020-10-12 | End: 2020-10-12 | Stop reason: HOSPADM

## 2020-10-12 RX ORDER — ONDANSETRON 4 MG/1
4 TABLET, ORALLY DISINTEGRATING ORAL EVERY 6 HOURS PRN
Qty: 10 TABLET | Refills: 0 | Status: SHIPPED | OUTPATIENT
Start: 2020-10-12 | End: 2022-01-19

## 2020-10-12 RX ORDER — ONDANSETRON 4 MG/1
4 TABLET, ORALLY DISINTEGRATING ORAL EVERY 6 HOURS PRN
Qty: 2 TABLET | Refills: 0 | Status: SHIPPED | OUTPATIENT
Start: 2020-10-12 | End: 2020-10-12

## 2020-10-12 RX ORDER — ONDANSETRON 2 MG/ML
4 INJECTION INTRAMUSCULAR; INTRAVENOUS EVERY 30 MIN PRN
Status: DISCONTINUED | OUTPATIENT
Start: 2020-10-11 | End: 2020-10-12 | Stop reason: HOSPADM

## 2020-10-12 RX ADMIN — SODIUM CHLORIDE 1000 ML: 9 INJECTION, SOLUTION INTRAVENOUS at 00:31

## 2020-10-12 RX ADMIN — ONDANSETRON 4 MG: 4 TABLET, ORALLY DISINTEGRATING ORAL at 01:38

## 2020-10-12 RX ADMIN — ONDANSETRON 4 MG: 2 INJECTION INTRAMUSCULAR; INTRAVENOUS at 00:32

## 2020-10-12 NOTE — DISCHARGE INSTRUCTIONS
Zofran ODT as needed for nausea.  Encourage fluids and advance diet as tolerated.  Labs are very reassuring tonight.  Good job with controlling her blood sugars.  You should stay home from work/school until your COVID-19 test comes back negative.  That will take several days.  If it is positive, you should remain out of work/school to isolate for 10 days from when symptoms started AND 72 hours after fever resolves (without fever reducing medications) AND improvement of respiratory symptoms (whichever is longer).   It was nice visiting with you tonight.   I am glad you are feeling better and hope you continue to improve.    Thank you for choosing [facility]. We appreciate the opportunity to meet your urgent medical needs. Please let us know if we could have done anything to make your stay more satisfying.    After discharge, please closely monitor for any new or worsening symptoms. Return to the Emergency Department if you develop any acute worsening signs or symptoms.    If you had lab work, cultures or imaging studies done during your stay, the final results may still be pending. We will call you if your plan of care needs to change. However, if you are not improving as expected, please follow up with your primary care provider or clinic.     Start any prescription medications that were prescribed to you and take them as directed.     Please see additional handouts that may be pertinent to your condition.

## 2020-10-12 NOTE — ED PROVIDER NOTES
History     Chief Complaint   Patient presents with     Nausea & Vomiting     HPI  Heidi Su is a 35 year old female with insulin-dependent diabetes since developing gestational diabetes about 3 years ago.  She takes 60 units of Lantus in the evening and takes 20 units of NovoLog with meals and a sliding scale if it is high.  Over a week ago she was having problems with high blood sugars and had a upper insulin.  Finally got those under control then for the past week has felt nauseous.  Friday morning, 2 days ago she was drenched.  Her hair was all wet when she woke up.  She was nauseous and ended up having to leave work shortly after arrival.  Has now had diarrhea 2 or 3 times a day for the past several days.  No blood.  Some nausea but no vomiting.  No cough but has felt a little short of breath.  No known exposures that she is aware of.  Blood sugar was up to 500 again today she did take some extra insulin.  Able to drink but appetite has been down.    Allergies:  Allergies   Allergen Reactions     No Known Allergies        Problem List:    Patient Active Problem List    Diagnosis Date Noted     CARDIOVASCULAR SCREENING; LDL GOAL LESS THAN 160 04/03/2012     Priority: Medium        Past Medical History:    Past Medical History:   Diagnosis Date     Anxiety      Bronchitis      Depressive disorder      Diabetes (H)      NO ACTIVE PROBLEMS      Ovarian cyst        Past Surgical History:    Past Surgical History:   Procedure Laterality Date     CYSTECTOMY OVARIAN BENIGN       DILATION AND CURETTAGE       DILATION AND CURETTAGE  2011     GYN SURGERY       HEMORRHOID SURGERY         Family History:    Family History   Problem Relation Age of Onset     Cerebrovascular Disease Maternal Grandfather      Breast Cancer Maternal Grandfather      Asthma Sister      Diabetes Sister 14       Social History:  Marital Status:  Single [1]  Social History     Tobacco Use     Smoking status: Current Every Day Smoker      Packs/day: 0.50     Smokeless tobacco: Never Used   Substance Use Topics     Alcohol use: Yes     Comment: 1-2x/month     Drug use: No        Medications:         ondansetron (ZOFRAN ODT) 4 MG ODT tab       acetaminophen (TYLENOL) 500 MG tablet       albuterol (2.5 MG/3ML) 0.083% nebulizer solution       albuterol (ALBUTEROL) 108 (90 BASE) MCG/ACT Inhaler       ibuprofen (ADVIL,MOTRIN) 200 MG tablet       predniSONE (DELTASONE) 20 MG tablet          Review of Systems   All other systems reviewed and are negative.      Physical Exam   BP: (!) 121/90  Pulse: 89  Temp: 98.4  F (36.9  C)  Resp: 18  SpO2: 99 %      Physical Exam  Constitutional:       General: She is in acute distress (mild).      Appearance: Normal appearance.   HENT:      Mouth/Throat:      Mouth: Mucous membranes are moist.      Pharynx: Oropharynx is clear.   Cardiovascular:      Rate and Rhythm: Normal rate and regular rhythm.   Pulmonary:      Effort: Pulmonary effort is normal.      Breath sounds: Normal breath sounds.   Abdominal:      Palpations: Abdomen is soft.      Tenderness: There is no abdominal tenderness.   Musculoskeletal: Normal range of motion.   Skin:     General: Skin is warm and dry.   Neurological:      General: No focal deficit present.      Mental Status: She is alert and oriented to person, place, and time.   Psychiatric:         Mood and Affect: Mood normal.         ED Course  (with Medical Decision Making)    35-year-old insulin-dependent diabetic for the past 3 years after developing gestational diabetes has not felt well for the past week or so.  Had high blood sugars a week before got those done with extra insulin but has been nauseous now the past week or so and then woke on Friday morning, 2 days ago drenched in sweat.  She is felt hot and cold and had some slight shortness of breath but no cough.  Now with diarrhea 2 or 3 times a day for the past couple of days but no vomiting.    IV placed.  Labs sent including  COVID-19.  Will need normal saline.  Zofran for nausea.    She is feeling better after the IV fluids.  Labs came back and were very reassuring.  She did a good job of getting her blood sugars to come down.  Zofran ODT for home.  2 tablets sent and prescription sent to her pharmacy that she can  in the morning.  Verbal and written discharge instructions given.  She is comfortable with this plan.  COVID-19 is also pending and she will self quarantine until that is back and negative.  Work note written.          Procedures               Critical Care time:  none               Results for orders placed or performed during the hospital encounter of 10/11/20 (from the past 24 hour(s))   CBC with platelets differential   Result Value Ref Range    WBC 8.5 4.0 - 11.0 10e9/L    RBC Count 4.44 3.8 - 5.2 10e12/L    Hemoglobin 14.4 11.7 - 15.7 g/dL    Hematocrit 42.3 35.0 - 47.0 %    MCV 95 78 - 100 fl    MCH 32.4 26.5 - 33.0 pg    MCHC 34.0 31.5 - 36.5 g/dL    RDW 12.1 10.0 - 15.0 %    Platelet Count 335 150 - 450 10e9/L    Diff Method Automated Method     % Neutrophils 55.5 %    % Lymphocytes 33.8 %    % Monocytes 7.3 %    % Eosinophils 2.5 %    % Basophils 0.7 %    % Immature Granulocytes 0.2 %    Nucleated RBCs 0 0 /100    Absolute Neutrophil 4.7 1.6 - 8.3 10e9/L    Absolute Lymphocytes 2.9 0.8 - 5.3 10e9/L    Absolute Monocytes 0.6 0.0 - 1.3 10e9/L    Absolute Basophils 0.1 0.0 - 0.2 10e9/L    Abs Immature Granulocytes 0.0 0 - 0.4 10e9/L    Absolute Nucleated RBC 0.0    Basic metabolic panel   Result Value Ref Range    Sodium 135 133 - 144 mmol/L    Potassium 3.8 3.4 - 5.3 mmol/L    Chloride 105 94 - 109 mmol/L    Carbon Dioxide 24 20 - 32 mmol/L    Anion Gap 6 3 - 14 mmol/L    Glucose 137 (H) 70 - 99 mg/dL    Urea Nitrogen 22 7 - 30 mg/dL    Creatinine 0.66 0.52 - 1.04 mg/dL    GFR Estimate >90 >60 mL/min/[1.73_m2]    GFR Estimate If Black >90 >60 mL/min/[1.73_m2]    Calcium 9.7 8.5 - 10.1 mg/dL   UA with  Microscopic   Result Value Ref Range    Color Urine Straw     Appearance Urine Clear     Glucose Urine Negative NEG^Negative mg/dL    Bilirubin Urine Negative NEG^Negative    Ketones Urine Negative NEG^Negative mg/dL    Specific Gravity Urine 1.013 1.003 - 1.035    Blood Urine Negative NEG^Negative    pH Urine 5.0 5.0 - 7.0 pH    Protein Albumin Urine Negative NEG^Negative mg/dL    Urobilinogen mg/dL 0.0 0.0 - 2.0 mg/dL    Nitrite Urine Negative NEG^Negative    Leukocyte Esterase Urine Negative NEG^Negative    Source Midstream Urine     WBC Urine <1 0 - 5 /HPF    RBC Urine <1 0 - 2 /HPF    Squamous Epithelial /HPF Urine 1 0 - 1 /HPF    Mucous Urine Present (A) NEG^Negative /LPF   HCG qualitative urine   Result Value Ref Range    HCG Qual Urine Negative NEG^Negative       Medications   0.9% sodium chloride BOLUS (1,000 mLs Intravenous New Bag 10/12/20 0031)     Followed by   sodium chloride 0.9% infusion (has no administration in time range)   ondansetron (ZOFRAN) injection 4 mg (4 mg Intravenous Given 10/12/20 0032)       Assessments & Plan     I have reviewed the nursing notes.    I have reviewed the findings, diagnosis, plan and need for follow up with the patient.       Current Discharge Medication List      START taking these medications    Details   ondansetron (ZOFRAN ODT) 4 MG ODT tab Take 1 tablet (4 mg) by mouth every 6 hours as needed for nausea  Qty: 10 tablet, Refills: 0             Final diagnoses:   Nausea   Diarrhea, unspecified type       10/11/2020   [facility] EMERGENCY DEPT     Patricio Campbell MD  10/12/20 0126

## 2020-12-13 ENCOUNTER — HEALTH MAINTENANCE LETTER (OUTPATIENT)
Age: 35
End: 2020-12-13

## 2021-03-03 ENCOUNTER — HOSPITAL ENCOUNTER (EMERGENCY)
Facility: CLINIC | Age: 36
Discharge: HOME | End: 2021-03-03
Attending: FAMILY MEDICINE | Admitting: FAMILY MEDICINE
Payer: COMMERCIAL

## 2021-03-03 ENCOUNTER — APPOINTMENT (OUTPATIENT)
Dept: CT IMAGING | Facility: CLINIC | Age: 36
End: 2021-03-03
Attending: FAMILY MEDICINE
Payer: COMMERCIAL

## 2021-03-03 VITALS
WEIGHT: 160 LBS | HEART RATE: 75 BPM | TEMPERATURE: 98.7 F | DIASTOLIC BLOOD PRESSURE: 87 MMHG | RESPIRATION RATE: 20 BRPM | OXYGEN SATURATION: 99 % | BODY MASS INDEX: 26.63 KG/M2 | SYSTOLIC BLOOD PRESSURE: 124 MMHG

## 2021-03-03 DIAGNOSIS — S29.011A MUSCLE STRAIN OF CHEST WALL, INITIAL ENCOUNTER: ICD-10-CM

## 2021-03-03 PROCEDURE — 74176 CT ABD & PELVIS W/O CONTRAST: CPT

## 2021-03-03 PROCEDURE — 99284 EMERGENCY DEPT VISIT MOD MDM: CPT | Mod: 25 | Performed by: FAMILY MEDICINE

## 2021-03-03 PROCEDURE — 99283 EMERGENCY DEPT VISIT LOW MDM: CPT | Performed by: FAMILY MEDICINE

## 2021-03-03 NOTE — ED PROVIDER NOTES
History     Chief Complaint   Patient presents with     Chest Pain     HPI  Heidi Su is a 35 year old female with history of diabetes, depression, anxiety who presents to the emergency department with a 2+ week history of left lateral flank chest and back pain.  Patient states she originally injured this at work on 2/15.  She was seen on 2/16 and had x-rays including left rib detail which were negative.  She was given 5 Percocet at that time.  She had ongoing pain and had work restrictions but continue to work.  She was again seen on 2/23 and given 10 tablets of Percocet for pain control.  She continues to have left flank pain.  She was seen in [place] emergency department on 3/2-yesterday.  A repeat chest x-ray was unremarkable.  It was felt that this was ongoing musculoskeletal pain.  A requested prescription for narcotics was declined.  Patient presents to our emergency department.  She is very concerned that something is being missed.  She is worried about her kidney.  She has never seen any blood in her urine.  No frequency or dysuria.  No history of kidney stones or family history of kidney stones.  She is a restricted [place] patient and must receive all her prescriptions through her primary care provider.  She denies any fever or chills with this.  No other abdominal pain.  No nausea vomiting or diarrhea.  She has never had a rash to this area no history of shingles.  She has had a history of back problems and had back surgery at [facility]1 but states this pain is distinctly different than any pain she has had before.  The pain is worse in the morning when she wakes up and she has a lot of stiffness and soreness.  She states she is remained somewhat active as she has children at home including a 3-year-old.    Allergies:  Allergies   Allergen Reactions     No Known Allergies        Problem List:    Patient Active Problem List    Diagnosis Date Noted     CARDIOVASCULAR SCREENING; LDL GOAL LESS THAN 160  04/03/2012     Priority: Medium        Past Medical History:    Past Medical History:   Diagnosis Date     Anxiety      Bronchitis      Depressive disorder      Diabetes (H)      NO ACTIVE PROBLEMS      Ovarian cyst        Past Surgical History:    Past Surgical History:   Procedure Laterality Date     CYSTECTOMY OVARIAN BENIGN       DILATION AND CURETTAGE       DILATION AND CURETTAGE  2011     GYN SURGERY       HEMORRHOID SURGERY         Family History:    Family History   Problem Relation Age of Onset     Cerebrovascular Disease Maternal Grandfather      Breast Cancer Maternal Grandfather      Asthma Sister      Diabetes Sister 14       Social History:  Marital Status:  Single [1]  Social History     Tobacco Use     Smoking status: Current Every Day Smoker     Packs/day: 0.50     Smokeless tobacco: Never Used   Substance Use Topics     Alcohol use: Yes     Comment: 1-2x/month     Drug use: No        Medications:    acetaminophen (TYLENOL) 500 MG tablet  albuterol (2.5 MG/3ML) 0.083% nebulizer solution  albuterol (ALBUTEROL) 108 (90 BASE) MCG/ACT Inhaler  ibuprofen (ADVIL,MOTRIN) 200 MG tablet  ondansetron (ZOFRAN ODT) 4 MG ODT tab  predniSONE (DELTASONE) 20 MG tablet          Review of Systems   All other systems reviewed and are negative.      Physical Exam   BP: (!) 145/115  Pulse: 95  Temp: 98.7  F (37.1  C)  Resp: 12  Weight: 72.6 kg (160 lb)  SpO2: 99 %      Physical Exam  Vitals signs and nursing note reviewed.   HENT:      Head: Normocephalic and atraumatic.      Nose: Nose normal.   Eyes:      Conjunctiva/sclera: Conjunctivae normal.   Neck:      Musculoskeletal: Normal range of motion.   Cardiovascular:      Rate and Rhythm: Normal rate.      Pulses: Normal pulses.      Heart sounds: Normal heart sounds.   Pulmonary:      Effort: Pulmonary effort is normal.      Breath sounds: Normal breath sounds. No decreased breath sounds, wheezing or rales.          Comments: She has some tenderness to her left flank  area on palpation.  There is no rash to this area.  Patient winces in pain even when touched lightly to finger touch.  No abdominal tenderness.  Benign abdominal exam.  No crepitus.  No well localized tenderness over any specific rib.  Chest:      Chest wall: Tenderness present.       Abdominal:      General: Abdomen is flat.      Palpations: Abdomen is soft.   Musculoskeletal:      Comments: Patient is very guarded in her movements of her chest and back.   Skin:     General: Skin is warm and dry.      Findings: No rash.   Neurological:      General: No focal deficit present.      Mental Status: She is alert.   Psychiatric:         Mood and Affect: Mood normal.         Behavior: Behavior normal.         ED Course        Procedures               Critical Care time:  none               Results for orders placed or performed during the hospital encounter of 03/03/21 (from the past 24 hour(s))   CT Abdomen Pelvis w/o Contrast    Narrative    CT ABDOMEN AND PELVIS WITHOUT CONTRAST 3/3/2021 1:07 PM    CLINICAL HISTORY: Flank pain, kidney stone suspected.    TECHNIQUE: CT scan of the abdomen and pelvis was performed without IV  contrast. Multiplanar   reformats were obtained. Dose reduction techniques were used.    CONTRAST: None.    COMPARISON: 4/23/2012    FINDINGS:   LOWER CHEST: Normal.    HEPATOBILIARY: Normal.    PANCREAS: Normal.    SPLEEN: Normal.    ADRENAL GLANDS: Normal.    KIDNEYS/BLADDER: No hydronephrosis, hydroureter, or urinary tract  calculi are demonstrated. Urinary bladder unremarkable.    BOWEL: Mild colonic diverticulosis without evidence of acute  diverticulitis. Normal appendix.    LYMPH NODES: Normal.    VASCULATURE: Unremarkable.    PELVIC ORGANS: Normal.    OTHER: No ascites or lymphadenopathy.    MUSCULOSKELETAL: Normal.      Impression    IMPRESSION:   1.  Unremarkable noncontrast CT of the abdomen and pelvis. No cause  for left flank pain demonstrated.    KIRILL BETANCOURT MD       Medications -  No data to display    Assessments & Plan (with Medical Decision Making)   MDM-=-35-year-old female who originally injured her left side on 2/15.  She has seen her regular practitioner and was seen yesterday in the emergency department and was also seen the day after the incident.  She had plain x-rays x2.  She was very concerned that something was not being missed as she had ongoing pain.  Her pain is somewhat over the left flank so I did do a CT scan to rule out any renal cause or other etiology.  CT is normal.  She has no fever chills cough or congestion and I doubt any infectious etiology.  She is tender on exam to her left lower chest mid back flank side area.  By her history and exam I suspect this is ongoing inflammation of the musculoskeletal etiology for her pain.  I reassured the patient.  I recommend she follow-up with her primary care provider.  No prescriptions were given.  She has already been given work restrictions and no new changes were made.  I encouraged her to stay up and active and reevaluate if she develops new or worsening symptoms.  Patient expresses understanding and is comfortable with this evaluation treatment and discharge plan and she is discharged in stable condition.  I have reviewed the nursing notes.    I have reviewed the findings, diagnosis, plan and need for follow up with the patient.       New Prescriptions    No medications on file       Final diagnoses:   Muscle strain of chest wall, initial encounter       3/3/2021   [facility] EMERGENCY DEPT     Steve, Prosper RIVERA MD  03/03/21 1814

## 2021-09-26 ENCOUNTER — HEALTH MAINTENANCE LETTER (OUTPATIENT)
Age: 36
End: 2021-09-26

## 2022-01-16 ENCOUNTER — HEALTH MAINTENANCE LETTER (OUTPATIENT)
Age: 37
End: 2022-01-16

## 2022-01-19 ENCOUNTER — HOSPITAL ENCOUNTER (EMERGENCY)
Facility: CLINIC | Age: 37
Discharge: HOME | End: 2022-01-19
Attending: EMERGENCY MEDICINE | Admitting: EMERGENCY MEDICINE
Payer: COMMERCIAL

## 2022-01-19 VITALS
HEART RATE: 101 BPM | WEIGHT: 150 LBS | DIASTOLIC BLOOD PRESSURE: 86 MMHG | TEMPERATURE: 98.3 F | RESPIRATION RATE: 20 BRPM | SYSTOLIC BLOOD PRESSURE: 124 MMHG | HEIGHT: 66 IN | OXYGEN SATURATION: 100 % | BODY MASS INDEX: 24.11 KG/M2

## 2022-01-19 DIAGNOSIS — M54.42 ACUTE BILATERAL LOW BACK PAIN WITH LEFT-SIDED SCIATICA: ICD-10-CM

## 2022-01-19 LAB
ALBUMIN UR-MCNC: NEGATIVE MG/DL
APPEARANCE UR: ABNORMAL
BILIRUB UR QL STRIP: NEGATIVE
COLOR UR AUTO: YELLOW
FLUAV RNA SPEC QL NAA+PROBE: NEGATIVE
FLUBV RNA RESP QL NAA+PROBE: NEGATIVE
GLUCOSE UR STRIP-MCNC: >499 MG/DL
HGB UR QL STRIP: NEGATIVE
HYALINE CASTS: 1 /LPF
KETONES UR STRIP-MCNC: NEGATIVE MG/DL
LEUKOCYTE ESTERASE UR QL STRIP: NEGATIVE
MUCOUS THREADS #/AREA URNS LPF: PRESENT /LPF
NITRATE UR QL: NEGATIVE
PH UR STRIP: 5 [PH] (ref 5–7)
RBC URINE: 1 /HPF
SARS-COV-2 RNA RESP QL NAA+PROBE: NEGATIVE
SP GR UR STRIP: 1.02 (ref 1–1.03)
SQUAMOUS EPITHELIAL: 7 /HPF
UROBILINOGEN UR STRIP-MCNC: NORMAL MG/DL
WBC URINE: 3 /HPF

## 2022-01-19 PROCEDURE — 250N000013 HC RX MED GY IP 250 OP 250 PS 637: Performed by: EMERGENCY MEDICINE

## 2022-01-19 PROCEDURE — 99284 EMERGENCY DEPT VISIT MOD MDM: CPT | Performed by: EMERGENCY MEDICINE

## 2022-01-19 PROCEDURE — 96372 THER/PROPH/DIAG INJ SC/IM: CPT | Performed by: EMERGENCY MEDICINE

## 2022-01-19 PROCEDURE — 250N000011 HC RX IP 250 OP 636: Performed by: EMERGENCY MEDICINE

## 2022-01-19 PROCEDURE — 87636 SARSCOV2 & INF A&B AMP PRB: CPT | Performed by: EMERGENCY MEDICINE

## 2022-01-19 PROCEDURE — C9803 HOPD COVID-19 SPEC COLLECT: HCPCS | Performed by: EMERGENCY MEDICINE

## 2022-01-19 PROCEDURE — 81001 URINALYSIS AUTO W/SCOPE: CPT | Performed by: EMERGENCY MEDICINE

## 2022-01-19 RX ORDER — KETOROLAC TROMETHAMINE 30 MG/ML
60 INJECTION, SOLUTION INTRAMUSCULAR; INTRAVENOUS ONCE
Status: COMPLETED | OUTPATIENT
Start: 2022-01-19 | End: 2022-01-19

## 2022-01-19 RX ORDER — METHOCARBAMOL 750 MG/1
750 TABLET, FILM COATED ORAL 4 TIMES DAILY PRN
Qty: 20 TABLET | Refills: 0 | Status: SHIPPED | OUTPATIENT
Start: 2022-01-19 | End: 2023-03-11

## 2022-01-19 RX ORDER — DIAZEPAM 5 MG
5 TABLET ORAL ONCE
Status: COMPLETED | OUTPATIENT
Start: 2022-01-19 | End: 2022-01-19

## 2022-01-19 RX ORDER — PROCHLORPERAZINE 25 MG/1
SUPPOSITORY RECTAL
COMMUNITY
Start: 2021-12-06

## 2022-01-19 RX ORDER — ONDANSETRON 4 MG/1
4 TABLET, ORALLY DISINTEGRATING ORAL ONCE
Status: COMPLETED | OUTPATIENT
Start: 2022-01-19 | End: 2022-01-19

## 2022-01-19 RX ORDER — HYDROXYZINE HYDROCHLORIDE 50 MG/1
50-100 TABLET, FILM COATED ORAL
Status: ON HOLD | COMMUNITY
Start: 2021-09-20 | End: 2023-05-19

## 2022-01-19 RX ORDER — CLONAZEPAM 0.5 MG/1
0.5 TABLET ORAL DAILY PRN
Status: ON HOLD | COMMUNITY
Start: 2021-11-29 | End: 2023-05-19

## 2022-01-19 RX ORDER — LIDOCAINE 4 G/G
2 PATCH TOPICAL ONCE
Status: DISCONTINUED | OUTPATIENT
Start: 2022-01-19 | End: 2022-01-19 | Stop reason: HOSPADM

## 2022-01-19 RX ORDER — ONDANSETRON 4 MG/1
4 TABLET, ORALLY DISINTEGRATING ORAL EVERY 6 HOURS PRN
Qty: 10 TABLET | Refills: 0 | Status: SHIPPED | OUTPATIENT
Start: 2022-01-19 | End: 2022-01-22

## 2022-01-19 RX ORDER — DEXTROAMPHETAMINE SACCHARATE, AMPHETAMINE ASPARTATE, DEXTROAMPHETAMINE SULFATE AND AMPHETAMINE SULFATE 3.75; 3.75; 3.75; 3.75 MG/1; MG/1; MG/1; MG/1
15 TABLET ORAL 2 TIMES DAILY
COMMUNITY
Start: 2022-01-02 | End: 2024-06-05

## 2022-01-19 RX ORDER — PROCHLORPERAZINE 25 MG/1
SUPPOSITORY RECTAL
COMMUNITY
Start: 2022-01-04

## 2022-01-19 RX ORDER — OXYCODONE HYDROCHLORIDE 5 MG/1
5 TABLET ORAL ONCE
Status: COMPLETED | OUTPATIENT
Start: 2022-01-19 | End: 2022-01-19

## 2022-01-19 RX ORDER — AMOXICILLIN 875 MG
875 TABLET ORAL 2 TIMES DAILY
COMMUNITY
Start: 2022-01-13 | End: 2022-01-23

## 2022-01-19 RX ORDER — DEXTROAMPHETAMINE SACCHARATE, AMPHETAMINE ASPARTATE, DEXTROAMPHETAMINE SULFATE AND AMPHETAMINE SULFATE 2.5; 2.5; 2.5; 2.5 MG/1; MG/1; MG/1; MG/1
10 TABLET ORAL DAILY PRN
Status: ON HOLD | COMMUNITY
Start: 2022-01-18 | End: 2023-05-19

## 2022-01-19 RX ORDER — LISDEXAMFETAMINE DIMESYLATE 40 MG/1
40 CAPSULE ORAL EVERY MORNING
Status: ON HOLD | COMMUNITY
Start: 2022-01-02 | End: 2023-05-19

## 2022-01-19 RX ORDER — OXYCODONE HYDROCHLORIDE 5 MG/1
5 TABLET ORAL EVERY 6 HOURS PRN
Qty: 16 TABLET | Refills: 0 | Status: SHIPPED | OUTPATIENT
Start: 2022-01-19 | End: 2022-12-19 | Stop reason: ALTCHOICE

## 2022-01-19 RX ORDER — FAMOTIDINE 20 MG/1
20 TABLET, FILM COATED ORAL 2 TIMES DAILY PRN
COMMUNITY
Start: 2021-04-27

## 2022-01-19 RX ADMIN — OXYCODONE HYDROCHLORIDE 5 MG: 5 TABLET ORAL at 16:37

## 2022-01-19 RX ADMIN — ONDANSETRON 4 MG: 4 TABLET, ORALLY DISINTEGRATING ORAL at 15:42

## 2022-01-19 RX ADMIN — LIDOCAINE PATCH 4% 2 PATCH: 40 PATCH TOPICAL at 15:43

## 2022-01-19 RX ADMIN — DIAZEPAM 5 MG: 5 TABLET ORAL at 16:37

## 2022-01-19 RX ADMIN — KETOROLAC TROMETHAMINE 60 MG: 30 INJECTION, SOLUTION INTRAMUSCULAR at 15:44

## 2022-01-19 ASSESSMENT — MIFFLIN-ST. JEOR: SCORE: 1397.15

## 2022-01-19 NOTE — ED PROVIDER NOTES
"  History     Chief Complaint   Patient presents with     Back Pain     The history is provided by the patient.     Heidi Su is a 36 year old female who presents to the Emergency Department with complaints of lower back pain. Patient stated that the pain started 2 days ago after work. Patient stated that she laid down after work and she had back pain that was radiating down into her legs. Patient describes the pain in her legs as throbbing cramping pain in legs.  Patient has remote history of low back pain with left sciatic symptoms but this improved after surgical decompression hemilaminotomy/discectomy of the left L5/S1.  Patient notes nausea which she believes is related to the pain.  This makes her feel short of breath.. Patient has weakness in her legs and states that they feel like \"jelly\". Patient does not remember any new trauma that would have caused the back pain.  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  Patient states that the pain is worse on her left side than her right side. She also states that it hurts to take deep breaths. Patient has been taking ibuprofen and tylenol for the pain and the last time she took medication was about an hour ago she took tylenol. Patient states that she does not have a hx of kidney stones.  She denies any fevers, chills, chest pain, cough. Patient does smoke. Patient has had normal urination, no loss of bowel or bladder function.  No sign of anesthesia.  Patient has insulin-dependent diabetes mellitus and has a pain pump.  She does note that her blood sugar runs around 200 after she eats and that her pain pump just ran out of battery so she is recharging it currently. Patient is not immunized to Covid and she had it last April. Patient is supposed to be taking amoxicillin due to a tooth ache.     Allergies:  Allergies   Allergen Reactions     No Known Allergies        Problem List:    Patient Active Problem List    Diagnosis Date Noted     CARDIOVASCULAR " SCREENING; LDL GOAL LESS THAN 160 04/03/2012     Priority: Medium        Past Medical History:    Past Medical History:   Diagnosis Date     Anxiety      Bronchitis      Depressive disorder      Diabetes (H)      NO ACTIVE PROBLEMS      Ovarian cyst        Past Surgical History:    Past Surgical History:   Procedure Laterality Date     CYSTECTOMY OVARIAN BENIGN       DILATION AND CURETTAGE       DILATION AND CURETTAGE  2011     GYN SURGERY       HEMORRHOID SURGERY         Family History:    Family History   Problem Relation Age of Onset     Cerebrovascular Disease Maternal Grandfather      Breast Cancer Maternal Grandfather      Asthma Sister      Diabetes Sister 14       Social History:  Marital Status:  Single [1]  Social History     Tobacco Use     Smoking status: Current Every Day Smoker     Packs/day: 0.50     Smokeless tobacco: Never Used   Substance Use Topics     Alcohol use: Yes     Comment: 1-2x/month     Drug use: No        Medications:    amoxicillin (AMOXIL) 875 MG tablet  amphetamine-dextroamphetamine (ADDERALL) 10 MG tablet  amphetamine-dextroamphetamine (ADDERALL) 15 MG tablet  blood glucose (CONTOUR NEXT TEST) test strip  clonazePAM (KLONOPIN) 0.5 MG tablet  etonogestrel (NEXPLANON) 68 MG IMPL  famotidine (PEPCID) 20 MG tablet  insulin aspart (NOVOLOG VIAL) 100 UNITS/ML vial  lisdexamfetamine (VYVANSE) 40 MG capsule  methocarbamol (ROBAXIN) 750 MG tablet  ondansetron (ZOFRAN ODT) 4 MG ODT tab  oxyCODONE (ROXICODONE) 5 MG tablet  acetaminophen (TYLENOL) 500 MG tablet  albuterol (2.5 MG/3ML) 0.083% nebulizer solution  albuterol (ALBUTEROL) 108 (90 BASE) MCG/ACT Inhaler  Continuous Blood Gluc Sensor (DEXCOM G6 SENSOR) MISC  Continuous Blood Gluc Transmit (DEXCOM G6 TRANSMITTER) MISC  hydrOXYzine (ATARAX) 50 MG tablet  ibuprofen (ADVIL,MOTRIN) 200 MG tablet          Review of Systems   All other systems reviewed and are negative.      Physical Exam   BP: (!) 145/75  Pulse: 101  Temp: 98.3  F (36.8  " C)  Resp: 20  Height: 167.6 cm (5' 6\")  Weight: 68 kg (150 lb)  SpO2: 100 %      Physical Exam  Vitals and nursing note reviewed.   Constitutional:       Appearance: Normal appearance. She is normal weight.      Comments: Pleasant, anxious female sitting on the edge of the bed   HENT:      Head: Normocephalic.   Eyes:      Extraocular Movements: Extraocular movements intact.      Conjunctiva/sclera: Conjunctivae normal.   Cardiovascular:      Rate and Rhythm: Normal rate and regular rhythm.      Pulses: Normal pulses.      Heart sounds: Normal heart sounds.   Pulmonary:      Effort: Pulmonary effort is normal.      Breath sounds: Normal breath sounds.   Musculoskeletal:      Cervical back: Normal range of motion and neck supple.        Back:       Comments: Normal patellar reflexes bilaterally.  Normal CMS at the feet and ankles and knees.  Pain with straight leg raise on the left.  Antalgic gait.   Neurological:      Mental Status: She is alert.         ED Course (with Medical Decision Making)    Pt seen and examined by me.  RN and EPIC notes reviewed.       Patient with low back pain symptoms x2 days.  Some radiation down the legs, left greater than right.  She has other symptoms including nausea and has stated short of breath symptoms.  Not relieved with Tylenol and ibuprofen.  She does do a lot of lifting, twisting and turning at her work.    I do not think that there is any acute need for radiologic study at this point.  She does not have any loss of bowel or bladder function, fevers or other red flags.    Patient was given a dose of Toradol IM.  I did check a urine analysis.  Also, back pain has been associated with the omicron version of COVID-19 so COVID swab was checked.    COVID and influenza negative.  Urine shows glucose, slightly high specific gravity, no evidence for infection.    Patient has not had a lot of relief with the Toradol.  Lidocaine patch is placed.  She was given oral oxycodone and " Valium.  I am going to give her an Rx for oxycodone and Robaxin to try at home.  Zofran for nausea.  She follows a provider in [place] and I would like her to call tomorrow for follow-up.  However, if she has any significant changes, worsening or concerns she should return promptly at any time.                 Procedures      Results for orders placed or performed during the hospital encounter of 01/19/22 (from the past 24 hour(s))   Symptomatic; Yes Influenza A/B & SARS-CoV2 (COVID-19) Virus PCR Multiplex Nasopharyngeal    Specimen: Nasopharyngeal; Swab   Result Value Ref Range    Influenza A PCR Negative Negative    Influenza B PCR Negative Negative    SARS CoV2 PCR Negative Negative    Narrative    Testing was performed using the michael SARS-CoV-2 & Influenza A/B Assay on the michael Cece System. This test should be ordered for the detection of SARS-CoV-2 and influenza viruses in individuals who meet clinical and/or epidemiological criteria. Test performance is unknown in asymptomatic patients. This test is for in vitro diagnostic use under the FDA EUA for laboratories certified under CLIA to perform moderate and/or high complexity testing. This test has not been FDA cleared or approved. A negative result does not rule out the presence of PCR inhibitors in the specimen or target RNA in concentration below the limit of detection for the assay. If only one viral target is positive but coinfection with multiple targets is suspected, the sample should be re-tested with another FDA cleared, approved or authorized test, if coinfection would change clinical management. [facility] are certified under the Clinical Laboratory Improvement Amendments of 1988 (CLIA-88) as  qualified to perform moderate and/or high complexity laboratory testing.   UA with Microscopic reflex to Culture    Specimen: Urine, Midstream   Result Value Ref Range    Color Urine Yellow Colorless, Straw, Light Yellow, Yellow     Appearance Urine Slightly Cloudy (A) Clear    Glucose Urine >499 (A) Negative mg/dL    Bilirubin Urine Negative Negative    Ketones Urine Negative Negative mg/dL    Specific Gravity Urine 1.023 1.003 - 1.035    Blood Urine Negative Negative    pH Urine 5.0 5.0 - 7.0    Protein Albumin Urine Negative Negative mg/dL    Urobilinogen Urine Normal Normal, 2.0 mg/dL    Nitrite Urine Negative Negative    Leukocyte Esterase Urine Negative Negative    Mucus Urine Present (A) None Seen /LPF    RBC Urine 1 <=2 /HPF    WBC Urine 3 <=5 /HPF    Squamous Epithelials Urine 7 (H) <=1 /HPF    Hyaline Casts Urine 1 <=2 /LPF    Narrative    Urine Culture not indicated       Medications   ondansetron (ZOFRAN-ODT) ODT tab 4 mg (4 mg Oral Given 1/19/22 1542)   ketorolac (TORADOL) injection 60 mg (60 mg Intramuscular Given 1/19/22 1544)   oxyCODONE (ROXICODONE) tablet 5 mg (5 mg Oral Given 1/19/22 1637)   diazepam (VALIUM) tablet 5 mg (5 mg Oral Given 1/19/22 1637)       Assessments & Plan    I have reviewed the findings, diagnosis, plan and need for follow up with the patient.    Discharge Medication List as of 1/19/2022  4:52 PM      START taking these medications    Details   methocarbamol (ROBAXIN) 750 MG tablet Take 1 tablet (750 mg) by mouth 4 times daily as needed for muscle spasms, Disp-20 tablet, R-0, E-Prescribe      oxyCODONE (ROXICODONE) 5 MG tablet Take 1 tablet (5 mg) by mouth every 6 hours as needed for severe pain, Disp-16 tablet, R-0, E-Prescribe             Final diagnoses:   Acute bilateral low back pain with left-sided sciatica     Disposition: Patient discharged home in stable condition.  Plan as above.  Return for concerns.     This document serves as a record of services personally performed by Va Melton MD*. It was created on their behalf by Damian Loco, a trained medical scribe. The creation of this record is based on the provider's personal observations and the statements of the patient. This  document has been checked and approved by the attending provider.      Note: Chart documentation done in part with Dragon Voice Recognition software. Although reviewed after completion, some word and grammatical errors may remain.    1/19/2022   [facility] EMERGENCY DEPT     Va Melton MD  01/19/22 0371

## 2022-01-19 NOTE — ED TRIAGE NOTES
2 days of mid back pain that radiates down bilateral legs, weakness to legs, Denies injuries or falls

## 2022-01-19 NOTE — DISCHARGE INSTRUCTIONS
Please call your primary care provider to let them know about your ED visit.    Continue rest, ice or heat to the painful area.    Ibuprofen or Aleve for pain and inflammation.  Methocarbamol if needed for muscle spasm.  It can cause drowsiness and driving while on this medication.    Oxycodone if needed for severe pain.  This can cause constipation so take stool softeners if needed.  No driving while taking this medication either.    If the lidocaine patches help, you can buy these over-the-counter.  They can be left on for up to 12 hours.  No heat over the patches.     Zofran if needed for nausea.    If you have uncontrolled pain, other worsening symptoms or concerns return at any time.    I hope that you start to feel much better quickly!!!

## 2022-03-15 ENCOUNTER — HOSPITAL ENCOUNTER (EMERGENCY)
Facility: CLINIC | Age: 37
Discharge: HOME | End: 2022-03-15
Attending: EMERGENCY MEDICINE | Admitting: EMERGENCY MEDICINE
Payer: COMMERCIAL

## 2022-03-15 VITALS
DIASTOLIC BLOOD PRESSURE: 79 MMHG | HEIGHT: 66 IN | OXYGEN SATURATION: 99 % | TEMPERATURE: 98.3 F | BODY MASS INDEX: 24.11 KG/M2 | RESPIRATION RATE: 17 BRPM | HEART RATE: 78 BPM | SYSTOLIC BLOOD PRESSURE: 132 MMHG | WEIGHT: 150 LBS

## 2022-03-15 DIAGNOSIS — H61.21 IMPACTED CERUMEN OF RIGHT EAR: ICD-10-CM

## 2022-03-15 PROCEDURE — 69209 REMOVE IMPACTED EAR WAX UNI: CPT | Mod: RT | Performed by: EMERGENCY MEDICINE

## 2022-03-15 PROCEDURE — 99282 EMERGENCY DEPT VISIT SF MDM: CPT | Mod: 25 | Performed by: EMERGENCY MEDICINE

## 2022-03-15 NOTE — ED PROVIDER NOTES
History     Chief Complaint   Patient presents with     Otalgia     HPI  Heidi Su is a 36 year old female who presents with concerns about her right ear.  This is been present for 2 weeks where she has a plugged feeling.  No significant pain or drainage.  She has tried candling and other wax emulsifier's without relief.    Allergies:  Allergies   Allergen Reactions     No Known Allergies        Problem List:    Patient Active Problem List    Diagnosis Date Noted     CARDIOVASCULAR SCREENING; LDL GOAL LESS THAN 160 04/03/2012     Priority: Medium        Past Medical History:    Past Medical History:   Diagnosis Date     Anxiety      Bronchitis      Depressive disorder      Diabetes (H)      NO ACTIVE PROBLEMS      Ovarian cyst        Past Surgical History:    Past Surgical History:   Procedure Laterality Date     CYSTECTOMY OVARIAN BENIGN       DILATION AND CURETTAGE       DILATION AND CURETTAGE  2011     GYN SURGERY       HEMORRHOID SURGERY         Family History:    Family History   Problem Relation Age of Onset     Cerebrovascular Disease Maternal Grandfather      Breast Cancer Maternal Grandfather      Asthma Sister      Diabetes Sister 14       Social History:  Marital Status:  Single [1]  Social History     Tobacco Use     Smoking status: Current Every Day Smoker     Packs/day: 0.50     Smokeless tobacco: Never Used   Substance Use Topics     Alcohol use: Yes     Comment: 1-2x/month     Drug use: No        Medications:    acetaminophen (TYLENOL) 500 MG tablet  albuterol (2.5 MG/3ML) 0.083% nebulizer solution  albuterol (ALBUTEROL) 108 (90 BASE) MCG/ACT Inhaler  amphetamine-dextroamphetamine (ADDERALL) 10 MG tablet  amphetamine-dextroamphetamine (ADDERALL) 15 MG tablet  blood glucose (CONTOUR NEXT TEST) test strip  clonazePAM (KLONOPIN) 0.5 MG tablet  Continuous Blood Gluc Sensor (DEXCOM G6 SENSOR) MISC  Continuous Blood Gluc Transmit (DEXCOM G6 TRANSMITTER) MISC  etonogestrel (NEXPLANON) 68 MG  "IMPL  famotidine (PEPCID) 20 MG tablet  hydrOXYzine (ATARAX) 50 MG tablet  ibuprofen (ADVIL,MOTRIN) 200 MG tablet  insulin aspart (NOVOLOG VIAL) 100 UNITS/ML vial  lisdexamfetamine (VYVANSE) 40 MG capsule  methocarbamol (ROBAXIN) 750 MG tablet  oxyCODONE (ROXICODONE) 5 MG tablet          Review of Systems  All other systems are reviewed and are negative    Physical Exam   BP: 132/79  Pulse: 78  Temp: 98.3  F (36.8  C)  Resp: 17  Height: 167.6 cm (5' 6\")  Weight: 68 kg (150 lb)  SpO2: 99 %      Physical Exam  Vitals reviewed.   Constitutional:       General: She is not in acute distress.     Appearance: She is not diaphoretic.   HENT:      Head: Normocephalic and atraumatic.      Right Ear: There is impacted cerumen.      Ears:      Comments: Left external auditory canal also shows some cerumen but not occlusive  Eyes:      General: No scleral icterus.        Right eye: No discharge.         Left eye: No discharge.      Conjunctiva/sclera: Conjunctivae normal.   Pulmonary:      Effort: Pulmonary effort is normal.      Breath sounds: No stridor.   Musculoskeletal:         General: Normal range of motion.      Cervical back: Normal range of motion.   Skin:     General: Skin is warm and dry.      Findings: No rash.   Neurological:      Mental Status: She is alert.      Comments: Normal speech and mentation   Psychiatric:         Judgment: Judgment normal.         ED Course                 Procedures  Ear was irrigated with return of significant amount of debris.  After this was able to visualize the tympanic membrane.  No signs of infection.  Stable for discharge home            Critical Care time:  none               No results found for this or any previous visit (from the past 24 hour(s)).    Medications - No data to display    Assessments & Plan (with Medical Decision Making)  36-year-old with impacted cerumen of the right ear.  This was irrigated and discomfort and fullness was relieved.  No signs of infection on " inspection.  Follow-up if any concerns     I have reviewed the nursing notes.    I have reviewed the findings, diagnosis, plan and need for follow up with the patient.       New Prescriptions    No medications on file       Final diagnoses:   Impacted cerumen of right ear       3/15/2022   [facility] EMERGENCY DEPT     Benjamín Pulliam MD  03/15/22 1024

## 2022-03-15 NOTE — ED TRIAGE NOTES
Right ear pain for the past 3 weeks.  Did go to [facility] and then had decreased hearing and tried candling, flushing, wax drops and now in the past week pain has increased and radiates down right side of neck.  Also cannot hear in ear at all now.

## 2022-03-16 ENCOUNTER — PATIENT OUTREACH (OUTPATIENT)
Dept: CARE COORDINATION | Facility: CLINIC | Age: 37
End: 2022-03-16
Payer: COMMERCIAL

## 2022-03-16 DIAGNOSIS — Z71.89 OTHER SPECIFIED COUNSELING: ICD-10-CM

## 2022-03-16 NOTE — PROGRESS NOTES
Clinic Care Coordination Contact  [facility]/Voicemail       Clinical Data: Care Coordinator Outreach  Outreach attempted x 1.  Left message on patient's voicemail with call back information and requested return call.  Plan: Care Coordinator will try to reach patient again in 1-2 business days.    ROCIO Nunez  [phone]  [facility]

## 2022-03-17 NOTE — PROGRESS NOTES
Clinic Care Coordination Contact  [facility]/Voicemail       Clinical Data: Care Coordinator Outreach  Reason for referral: TCM outreach  Outreach attempted x 2.  Left message on patient's voicemail with call back information and requested return call.  Plan:  Care Coordinator will do no further outreaches at this time.     Lucy Larson  Community Health Worker  [facility]  Ph: [phone]

## 2022-03-26 ENCOUNTER — HOSPITAL ENCOUNTER (EMERGENCY)
Facility: CLINIC | Age: 37
Discharge: HOME | End: 2022-03-27
Attending: NURSE PRACTITIONER | Admitting: NURSE PRACTITIONER
Payer: COMMERCIAL

## 2022-03-26 VITALS
HEART RATE: 102 BPM | TEMPERATURE: 98.9 F | RESPIRATION RATE: 20 BRPM | SYSTOLIC BLOOD PRESSURE: 128 MMHG | DIASTOLIC BLOOD PRESSURE: 96 MMHG | OXYGEN SATURATION: 100 % | BODY MASS INDEX: 24.21 KG/M2 | WEIGHT: 150 LBS

## 2022-03-26 DIAGNOSIS — J02.0 ACUTE STREPTOCOCCAL PHARYNGITIS: ICD-10-CM

## 2022-03-26 LAB
DEPRECATED S PYO AG THROAT QL EIA: POSITIVE
FLUAV RNA SPEC QL NAA+PROBE: NEGATIVE
FLUBV RNA RESP QL NAA+PROBE: NEGATIVE
SARS-COV-2 RNA RESP QL NAA+PROBE: NEGATIVE

## 2022-03-26 PROCEDURE — 99284 EMERGENCY DEPT VISIT MOD MDM: CPT | Mod: CS | Performed by: NURSE PRACTITIONER

## 2022-03-26 PROCEDURE — 99283 EMERGENCY DEPT VISIT LOW MDM: CPT | Performed by: NURSE PRACTITIONER

## 2022-03-26 PROCEDURE — C9803 HOPD COVID-19 SPEC COLLECT: HCPCS | Performed by: NURSE PRACTITIONER

## 2022-03-26 PROCEDURE — 250N000009 HC RX 250: Performed by: NURSE PRACTITIONER

## 2022-03-26 PROCEDURE — 87636 SARSCOV2 & INF A&B AMP PRB: CPT | Performed by: NURSE PRACTITIONER

## 2022-03-26 PROCEDURE — 87880 STREP A ASSAY W/OPTIC: CPT | Performed by: NURSE PRACTITIONER

## 2022-03-26 RX ORDER — DEXAMETHASONE SODIUM PHOSPHATE 10 MG/ML
10 INJECTION, SOLUTION INTRAMUSCULAR; INTRAVENOUS ONCE
Status: COMPLETED | OUTPATIENT
Start: 2022-03-26 | End: 2022-03-26

## 2022-03-26 RX ORDER — AMOXICILLIN 500 MG/1
500 CAPSULE ORAL ONCE
Status: DISCONTINUED | OUTPATIENT
Start: 2022-03-26 | End: 2022-03-26

## 2022-03-26 RX ORDER — AMOXICILLIN 500 MG/1
500 CAPSULE ORAL 2 TIMES DAILY
Qty: 30 CAPSULE | Refills: 0 | Status: ON HOLD | OUTPATIENT
Start: 2022-03-26 | End: 2023-05-19

## 2022-03-26 RX ADMIN — DEXAMETHASONE SODIUM PHOSPHATE 10 MG: 10 INJECTION, SOLUTION INTRAMUSCULAR; INTRAVENOUS at 23:20

## 2022-03-27 NOTE — ED PROVIDER NOTES
History     Chief Complaint   Patient presents with     Pharyngitis     HPI  Heidi Su is a 36 year old female who presents for evaluation of sore throat.  Symptoms started yesterday.  Accompanied by nasal congestion.  No cough.  No chest pain or shortness of breath.  Reports severe pain with any swallowing and difficult getting fluids and due to the pain.  No vomiting.  No diarrhea.    Allergies:  Allergies   Allergen Reactions     No Known Allergies        Problem List:    Patient Active Problem List    Diagnosis Date Noted     CARDIOVASCULAR SCREENING; LDL GOAL LESS THAN 160 04/03/2012     Priority: Medium        Past Medical History:    Past Medical History:   Diagnosis Date     Anxiety      Bronchitis      Depressive disorder      Diabetes (H)      NO ACTIVE PROBLEMS      Ovarian cyst        Past Surgical History:    Past Surgical History:   Procedure Laterality Date     CYSTECTOMY OVARIAN BENIGN       DILATION AND CURETTAGE       DILATION AND CURETTAGE  2011     GYN SURGERY       HEMORRHOID SURGERY         Family History:    Family History   Problem Relation Age of Onset     Cerebrovascular Disease Maternal Grandfather      Breast Cancer Maternal Grandfather      Asthma Sister      Diabetes Sister 14       Social History:  Marital Status:  Single [1]  Social History     Tobacco Use     Smoking status: Current Every Day Smoker     Packs/day: 0.50     Smokeless tobacco: Never Used   Substance Use Topics     Alcohol use: Yes     Comment: 1-2x/month     Drug use: No        Medications:    amoxicillin (AMOXIL) 500 MG capsule  acetaminophen (TYLENOL) 500 MG tablet  albuterol (2.5 MG/3ML) 0.083% nebulizer solution  albuterol (ALBUTEROL) 108 (90 BASE) MCG/ACT Inhaler  amphetamine-dextroamphetamine (ADDERALL) 10 MG tablet  amphetamine-dextroamphetamine (ADDERALL) 15 MG tablet  blood glucose (CONTOUR NEXT TEST) test strip  clonazePAM (KLONOPIN) 0.5 MG tablet  Continuous Blood Gluc Sensor (DEXCOM G6 SENSOR)  MISC  Continuous Blood Gluc Transmit (DEXCOM G6 TRANSMITTER) MISC  etonogestrel (NEXPLANON) 68 MG IMPL  famotidine (PEPCID) 20 MG tablet  hydrOXYzine (ATARAX) 50 MG tablet  ibuprofen (ADVIL,MOTRIN) 200 MG tablet  insulin aspart (NOVOLOG VIAL) 100 UNITS/ML vial  lisdexamfetamine (VYVANSE) 40 MG capsule  methocarbamol (ROBAXIN) 750 MG tablet  oxyCODONE (ROXICODONE) 5 MG tablet          Review of Systems  As mentioned above in the history present illness. All other systems were reviewed and are negative.    Physical Exam   BP: (!) 128/96  Pulse: 102  Temp: 98.9  F (37.2  C)  Resp: 20  Weight: 68 kg (150 lb)  SpO2: 100 %      Physical Exam  GENERAL APPEARANCE: healthy, alert, ill-appearing and appears distressed.  EYES: conjunctiva clear  HENT: external ears and canals normal. Right TM normal. Left TM normal. Nasal congestion.   Posterior oropharynx erythema with tonsillar exudate. Tonsils 3+ bilat.  Uvula is midline.  No unilateral peritonsillar swelling. No trismus  NECK: supple, nontender, no lymphadenopathy  RESP: lungs clear to auscultation - no rales, rhonchi or wheezes  CV: regular rates and rhythm, no murmur noted    ED Course             Procedures         Results for orders placed or performed during the hospital encounter of 03/26/22 (from the past 24 hour(s))   Streptococcus A Rapid Scr w Reflx to PCR    Specimen: Throat; Swab   Result Value Ref Range    Group A Strep antigen Positive (A) Negative   Symptomatic; Yes; 3/25/2022 Influenza A/B & SARS-CoV2 (COVID-19) Virus PCR Multiplex Nose    Specimen: Nose; Swab   Result Value Ref Range    Influenza A PCR Negative Negative    Influenza B PCR Negative Negative    SARS CoV2 PCR Negative Negative    Narrative    Testing was performed using the michael SARS-CoV-2 & Influenza A/B Assay on the michael Cece System. This test should be ordered for the detection of SARS-CoV-2 and influenza viruses in individuals who meet clinical and/or epidemiological criteria. Test  performance is unknown in asymptomatic patients. This test is for in vitro diagnostic use under the FDA EUA for laboratories certified under CLIA to perform moderate and/or high complexity testing. This test has not been FDA cleared or approved. A negative result does not rule out the presence of PCR inhibitors in the specimen or target RNA in concentration below the limit of detection for the assay. If only one viral target is positive but coinfection with multiple targets is suspected, the sample should be re-tested with another FDA cleared, approved or authorized test, if coinfection would change clinical management. [facility] are certified under the Clinical Laboratory Improvement Amendments of 1988 (CLIA-88) as  qualified to perform moderate and/or high complexity laboratory testing.       Medications   dexamethasone (DECADRON) PF oral solution (inj used orally) 10 mg (10 mg Oral Given 3/26/22 2320)       Assessments & Plan (with Medical Decision Making)         Plan:  You were given a dose of dexamethasone 10 mg here today which should help with throat inflammation.  Amoxicillin 500 mg twice a day for 10 days.  Drink plenty of fluids.  Tylenol 650 mg every 4-6 hours as needed for pain.  Ibuprofen 400-600 mg every 6-8 hours as needed for pain  (take with food, stop if causing stomach pains.)  Return to the emergency department for vomiting, unable to get fluids down, or any other new symptoms of concern.    New Prescriptions    AMOXICILLIN (AMOXIL) 500 MG CAPSULE    Take 1 capsule (500 mg) by mouth 2 times daily       Final diagnoses:   Acute streptococcal pharyngitis       3/26/2022   [facility] EMERGENCY DEPT     Huong Herndon APRN CNP  03/27/22 0115

## 2022-03-27 NOTE — DISCHARGE INSTRUCTIONS
You were given a dose of dexamethasone 10 mg here today which should help with throat inflammation.  Amoxicillin 500 mg twice a day for 10 days.  Drink plenty of fluids.  Tylenol 650 mg every 4-6 hours as needed for pain.  Ibuprofen 400-600 mg every 6-8 hours as needed for pain  (take with food, stop if causing stomach pains.)  Return to the emergency department for vomiting, unable to get fluids down, or any other new symptoms of concern.

## 2022-10-25 ENCOUNTER — HOSPITAL ENCOUNTER (EMERGENCY)
Facility: CLINIC | Age: 37
Discharge: HOME | End: 2022-10-25
Attending: PHYSICIAN ASSISTANT | Admitting: PHYSICIAN ASSISTANT
Payer: COMMERCIAL

## 2022-10-25 VITALS
RESPIRATION RATE: 20 BRPM | OXYGEN SATURATION: 100 % | HEART RATE: 81 BPM | BODY MASS INDEX: 25.83 KG/M2 | DIASTOLIC BLOOD PRESSURE: 94 MMHG | SYSTOLIC BLOOD PRESSURE: 151 MMHG | TEMPERATURE: 97.5 F | HEIGHT: 65 IN | WEIGHT: 155 LBS

## 2022-10-25 DIAGNOSIS — M54.17 LUMBOSACRAL NEURITIS: ICD-10-CM

## 2022-10-25 PROCEDURE — 99284 EMERGENCY DEPT VISIT MOD MDM: CPT | Performed by: PHYSICIAN ASSISTANT

## 2022-10-25 RX ORDER — PREDNISONE 20 MG/1
TABLET ORAL
Qty: 15 TABLET | Refills: 0 | Status: SHIPPED | OUTPATIENT
Start: 2022-10-25 | End: 2023-03-11

## 2022-10-25 RX ORDER — OXYCODONE AND ACETAMINOPHEN 5; 325 MG/1; MG/1
1 TABLET ORAL EVERY 6 HOURS PRN
Qty: 12 TABLET | Refills: 0 | Status: SHIPPED | OUTPATIENT
Start: 2022-10-25 | End: 2022-10-28

## 2022-10-25 ASSESSMENT — ACTIVITIES OF DAILY LIVING (ADL): ADLS_ACUITY_SCORE: 33

## 2022-10-25 NOTE — ED TRIAGE NOTES
Pt reports moving a couch on Sunday and felt a pop and about 30 minutes later she started having lower back pain that goes down her right leg.      Triage Assessment     Row Name 10/25/22 9643       Triage Assessment (Adult)    Airway WDL WDL       Respiratory WDL    Respiratory WDL WDL       Skin Circulation/Temperature WDL    Skin Circulation/Temperature WDL WDL               lvm to inform pt his med was sent in but he needs to make an appt.

## 2022-10-25 NOTE — ED PROVIDER NOTES
"  History     Chief Complaint   Patient presents with     Back Pain     HPI  Heidi Su is a 37 year old female who presents for evaluation of flare of her chronic low back pain that has been an issue ever since 2009.  Reports that she underwent surgery in 2016.  Periodically will have flares of her back pain.  Reports that 3 days ago she was cleaning in her house.  She went to move a portion of her couch a little bit to clean, and she felt a pop in her back when she was twisting.  Within 30 minutes she had severe pain.  She has tried to her typical regimen of rest, heat, ibuprofen, and Robaxin but she continues to have significant pain rated 8 on a scale of 10.  Pain with any movement.  Pain starts in the low back and then radiates through the right buttock and into the posterior thigh.  Does not extend below the knee.  She notes numbness and tingling in this region as well.  She will get occasional \"zapping pain \"in the back.  Denies any red flag symptoms such as loss of bowel/bladder function, fever, chills, or night sweats.  Having hard time sleeping secondary to the pain.  She reports that prednisone typically helps her get out of a flare.  She denies any recent fall or trauma.  Denies any dysuria, frequency, urgency, or gross hematuria.  No change in vaginal discharge.  Normal bowel movements.  No skin rashes.    Allergies:  Allergies   Allergen Reactions     No Known Allergies        Problem List:    Patient Active Problem List    Diagnosis Date Noted     CARDIOVASCULAR SCREENING; LDL GOAL LESS THAN 160 04/03/2012     Priority: Medium        Past Medical History:    Past Medical History:   Diagnosis Date     Anxiety      Bronchitis      Depressive disorder      Diabetes (H)      NO ACTIVE PROBLEMS      Ovarian cyst        Past Surgical History:    Past Surgical History:   Procedure Laterality Date     CYSTECTOMY OVARIAN BENIGN       DILATION AND CURETTAGE       DILATION AND CURETTAGE  2011     GYN " "SURGERY       HEMORRHOID SURGERY         Family History:    Family History   Problem Relation Age of Onset     Cerebrovascular Disease Maternal Grandfather      Breast Cancer Maternal Grandfather      Asthma Sister      Diabetes Sister 14       Social History:  Marital Status:  Single [1]  Social History     Tobacco Use     Smoking status: Every Day     Packs/day: 0.25     Types: Cigarettes     Smokeless tobacco: Never   Substance Use Topics     Alcohol use: Yes     Comment: 1-2x/month     Drug use: No        Medications:    oxyCODONE-acetaminophen (PERCOCET) 5-325 MG tablet  predniSONE (DELTASONE) 20 MG tablet  acetaminophen (TYLENOL) 500 MG tablet  albuterol (2.5 MG/3ML) 0.083% nebulizer solution  albuterol (ALBUTEROL) 108 (90 BASE) MCG/ACT Inhaler  amoxicillin (AMOXIL) 500 MG capsule  amphetamine-dextroamphetamine (ADDERALL) 10 MG tablet  amphetamine-dextroamphetamine (ADDERALL) 15 MG tablet  blood glucose (CONTOUR NEXT TEST) test strip  clonazePAM (KLONOPIN) 0.5 MG tablet  Continuous Blood Gluc Sensor (DEXCOM G6 SENSOR) MISC  Continuous Blood Gluc Transmit (DEXCOM G6 TRANSMITTER) MISC  etonogestrel (NEXPLANON) 68 MG IMPL  famotidine (PEPCID) 20 MG tablet  hydrOXYzine (ATARAX) 50 MG tablet  ibuprofen (ADVIL,MOTRIN) 200 MG tablet  insulin aspart (NOVOLOG VIAL) 100 UNITS/ML vial  lisdexamfetamine (VYVANSE) 40 MG capsule  methocarbamol (ROBAXIN) 750 MG tablet  oxyCODONE (ROXICODONE) 5 MG tablet          Review of Systems   All other systems reviewed and are negative.      Physical Exam   BP: (!) 151/94  Pulse: 81  Temp: 97.5  F (36.4  C)  Resp: 20  Height: 165.1 cm (5' 5\")  Weight: 70.3 kg (155 lb)  SpO2: 100 %      Physical Exam  Vitals and nursing note reviewed.   Constitutional:       General: She is not in acute distress.     Appearance: She is not diaphoretic.   HENT:      Head: Normocephalic and atraumatic.      Right Ear: External ear normal.      Left Ear: External ear normal.      Nose: Nose normal.      " Mouth/Throat:      Mouth: Oropharynx is clear and moist.      Pharynx: No oropharyngeal exudate.   Eyes:      General: No scleral icterus.        Right eye: No discharge.         Left eye: No discharge.      Extraocular Movements: EOM normal.      Conjunctiva/sclera: Conjunctivae normal.      Pupils: Pupils are equal, round, and reactive to light.   Neck:      Thyroid: No thyromegaly.   Cardiovascular:      Rate and Rhythm: Normal rate and regular rhythm.      Heart sounds: Normal heart sounds. No murmur heard.  Pulmonary:      Effort: Pulmonary effort is normal. No respiratory distress.      Breath sounds: Normal breath sounds. No wheezing or rales.   Chest:      Chest wall: No tenderness.   Abdominal:      General: Bowel sounds are normal. There is no distension.      Palpations: Abdomen is soft. There is no mass.      Tenderness: There is no abdominal tenderness. There is no guarding or rebound.   Musculoskeletal:         General: No deformity or edema. Normal range of motion.      Cervical back: Normal range of motion and neck supple.      Comments: Lumbosacral spine area reveals no mass but there is tenderness of the paravertebral muscles. Limited and painful lumbosacral range of motion is noted. Straight leg raise is positive at 45 degrees on both sides. DTR's, motor strength and sensation normal, including heel and toe gait.  Peripheral pulses are palpable. Hips and knees have full range of motion without pain.    Lymphadenopathy:      Cervical: No cervical adenopathy.   Skin:     General: Skin is warm and dry.      Capillary Refill: Capillary refill takes less than 2 seconds.      Findings: No erythema or rash.   Neurological:      Mental Status: She is alert and oriented to person, place, and time.      Cranial Nerves: No cranial nerve deficit.   Psychiatric:         Mood and Affect: Mood and affect normal.         Behavior: Behavior normal.         Thought Content: Thought content normal.         ED Course                  Procedures              Critical Care time:  none               No results found for this or any previous visit (from the past 24 hour(s)).    Medications - No data to display    Assessments & Plan (with Medical Decision Making)  Lumbosacral neuritis     37 year old female with a history of chronic recurrent low back pain with radicular symptoms ever since 2009 and previous lumbar surgery in 2016 who presents for evaluation of an exacerbation of her pain.  Worse over the past 2.5 days.  She was cleaning and twisting when she felt a pop in her back and has had radicular pain into the posterior thigh with numbness and tingling ever since.  Not responding to her typical regimen of rest, heat, ibuprofen, and Robaxin.  Pain currently rated 8 on a scale of 10.  No red flag symptoms.  On exam vital signs are stable.  Afebrile.  Tenderness to the paravertebral musculature.  Pain with any range of motion.  SLR positive at 45 degrees.  Lower extremity strength, sensation, DTRs intact.  No other concerning exam findings.    Appears to be an acute exacerbation of her chronic recurrent condition.  No acute trauma to suggest need for x-ray evaluation.  Treatment with anti-inflammatory therapy in the form of prednisone 20 mg twice daily for 5 days and then 20 mg daily for 5 days.  Oxycodone/acetaminophen for breakthrough pain.  Continue with ibuprofen and Robaxin.  No driving for 8 hours after taking oxycodone given the sedation side effect.  She will follow-up with her PCP in a week to recheck her condition.  Red flag symptoms to return to the ED for prior to then reviewed with the patient in detail.  She was in agreement with this plan and was suitable for discharge.  She had to drive home, therefore we did not treat her pain here in the ED     I have reviewed the nursing notes.    I have reviewed the findings, diagnosis, plan and need for follow up with the patient.       New Prescriptions     OXYCODONE-ACETAMINOPHEN (PERCOCET) 5-325 MG TABLET    Take 1 tablet by mouth every 6 hours as needed for pain    PREDNISONE (DELTASONE) 20 MG TABLET    20 mg twice daily for 5 days, then 20 mg once daily for 5 days       Final diagnoses:   Lumbosacral neuritis     Disclaimer: This note consists of symbols derived from keyboarding, dictation and/or voice recognition software. As a result, there may be errors in the script that have gone undetected. Please consider this when interpreting information found in this chart.    10/25/2022   [facility] EMERGENCY DEPT     Drake Cheek PA-C  10/25/22 2553

## 2022-11-29 ENCOUNTER — HOSPITAL ENCOUNTER (EMERGENCY)
Facility: CLINIC | Age: 37
Discharge: HOME | End: 2022-11-29
Attending: FAMILY MEDICINE | Admitting: FAMILY MEDICINE
Payer: COMMERCIAL

## 2022-11-29 ENCOUNTER — NURSE TRIAGE (OUTPATIENT)
Dept: NURSING | Facility: CLINIC | Age: 37
End: 2022-11-29

## 2022-11-29 VITALS
BODY MASS INDEX: 26.63 KG/M2 | RESPIRATION RATE: 18 BRPM | OXYGEN SATURATION: 97 % | TEMPERATURE: 98 F | DIASTOLIC BLOOD PRESSURE: 102 MMHG | WEIGHT: 160 LBS | HEART RATE: 91 BPM | SYSTOLIC BLOOD PRESSURE: 158 MMHG

## 2022-11-29 DIAGNOSIS — E10.65 TYPE 1 DIABETES MELLITUS WITH HYPERGLYCEMIA (H): ICD-10-CM

## 2022-11-29 LAB
ALBUMIN SERPL-MCNC: 3.7 G/DL (ref 3.4–5)
ALBUMIN UR-MCNC: NEGATIVE MG/DL
ALP SERPL-CCNC: 73 U/L (ref 40–150)
ALT SERPL W P-5'-P-CCNC: 15 U/L (ref 0–50)
ANION GAP SERPL CALCULATED.3IONS-SCNC: 8 MMOL/L (ref 3–14)
APPEARANCE UR: CLEAR
AST SERPL W P-5'-P-CCNC: 10 U/L (ref 0–45)
BASE EXCESS BLDV CALC-SCNC: -3.4 MMOL/L (ref -7.7–1.9)
BILIRUB SERPL-MCNC: 0.3 MG/DL (ref 0.2–1.3)
BILIRUB UR QL STRIP: NEGATIVE
BUN SERPL-MCNC: 16 MG/DL (ref 7–30)
CALCIUM SERPL-MCNC: 9.1 MG/DL (ref 8.5–10.1)
CHLORIDE BLD-SCNC: 101 MMOL/L (ref 94–109)
CO2 SERPL-SCNC: 20 MMOL/L (ref 20–32)
COLOR UR AUTO: YELLOW
CREAT SERPL-MCNC: 0.67 MG/DL (ref 0.52–1.04)
GFR SERPL CREATININE-BSD FRML MDRD: >90 ML/MIN/1.73M2
GLUCOSE BLD-MCNC: 519 MG/DL (ref 70–99)
GLUCOSE UR STRIP-MCNC: 1000 MG/DL
HCO3 BLDV-SCNC: 21 MMOL/L (ref 21–28)
HGB UR QL STRIP: ABNORMAL
HOLD SPECIMEN: NORMAL
KETONES BLD-SCNC: 0.2 MMOL/L (ref 0–0.6)
KETONES UR STRIP-MCNC: 15 MG/DL
LEUKOCYTE ESTERASE UR QL STRIP: ABNORMAL
NITRATE UR QL: NEGATIVE
O2/TOTAL GAS SETTING VFR VENT: 21 %
PCO2 BLDV: 34 MM HG (ref 40–50)
PH BLDV: 7.39 [PH] (ref 7.32–7.43)
PH UR STRIP: 5 [PH] (ref 5–7)
PO2 BLDV: 60 MM HG (ref 25–47)
POTASSIUM BLD-SCNC: 4.1 MMOL/L (ref 3.4–5.3)
PROT SERPL-MCNC: 7.5 G/DL (ref 6.8–8.8)
RBC URINE: 1 /HPF
SODIUM SERPL-SCNC: 129 MMOL/L (ref 133–144)
SP GR UR STRIP: <1.005 (ref 1–1.03)
SQUAMOUS EPITHELIAL: 5 /HPF
UROBILINOGEN UR STRIP-MCNC: NORMAL MG/DL
WBC URINE: 1 /HPF

## 2022-11-29 PROCEDURE — 82040 ASSAY OF SERUM ALBUMIN: CPT | Performed by: FAMILY MEDICINE

## 2022-11-29 PROCEDURE — 99283 EMERGENCY DEPT VISIT LOW MDM: CPT | Mod: 25 | Performed by: FAMILY MEDICINE

## 2022-11-29 PROCEDURE — 99282 EMERGENCY DEPT VISIT SF MDM: CPT | Performed by: FAMILY MEDICINE

## 2022-11-29 PROCEDURE — 82010 KETONE BODYS QUAN: CPT | Performed by: FAMILY MEDICINE

## 2022-11-29 PROCEDURE — 81001 URINALYSIS AUTO W/SCOPE: CPT | Performed by: FAMILY MEDICINE

## 2022-11-29 PROCEDURE — 96360 HYDRATION IV INFUSION INIT: CPT | Performed by: FAMILY MEDICINE

## 2022-11-29 PROCEDURE — 80053 COMPREHEN METABOLIC PANEL: CPT | Performed by: FAMILY MEDICINE

## 2022-11-29 PROCEDURE — 258N000003 HC RX IP 258 OP 636: Performed by: FAMILY MEDICINE

## 2022-11-29 PROCEDURE — 36415 COLL VENOUS BLD VENIPUNCTURE: CPT | Performed by: FAMILY MEDICINE

## 2022-11-29 PROCEDURE — 82803 BLOOD GASES ANY COMBINATION: CPT | Performed by: FAMILY MEDICINE

## 2022-11-29 RX ADMIN — SODIUM CHLORIDE 1000 ML: 9 INJECTION, SOLUTION INTRAVENOUS at 06:51

## 2022-11-29 ASSESSMENT — ACTIVITIES OF DAILY LIVING (ADL): ADLS_ACUITY_SCORE: 35

## 2022-11-29 NOTE — ED PROVIDER NOTES
Patient signed out at shift change from Dr. Cory Campbell.  Here for screening of possible DKA.  DM type I on insulin pump  Noted hyperglycemia last mammogram 11 PM with glucose in the 380s.  Glucose continue to climb through the nighttime she noted that her site for insulin pump got bad  Changed site so she is now getting insulin through the pump.  Checked urine ketones and there were moderate so she came into the ED.    States she does not want DKA because she did not like her experience in the ICU.      Noted on labs at beta hydroxybutyrate/ketones was negative  Initial blood glucose in the 500s now back to 380  Patient feeling well  Venous gas shows normal pH    Confirmed no DKA just hyperglycemia from pump site problems.  Discharged home.       Fabiano Uriarte,   11/29/22 [id number]

## 2022-11-29 NOTE — DISCHARGE INSTRUCTIONS
-Fortunately recognized pulm site problems before diabetic ketoacidosis develop.  The hyperglycemia should gradually improve with return to insulin pump use today.

## 2022-11-29 NOTE — TELEPHONE ENCOUNTER
"Triage Call    Pt calling with report that her Blood Glucose was in the 400's since 11:00 pm last evening and got up to 460 at 5:00 am this morning and has small amt urine ketones.      Says that her insulin was not absorbing- says the site had a \"lump\" but is now working since she changed to a new site, and has come down to 403 so far.    Denies she had any rapid breathing, vomiting,  And does not have a fever or current illness.    Pt says she has an endocrinologist who manages her diabetes.  I advised she call her endocrinologist to inform him of these BGs, but pt unwilling to do that.    Says she has programmed her BG into her pump, and the insulin is still infusing and it will come down.  Told pt that she should watch her BG and if not coming down to a normal level she should contact her Endocrinologist.    Tina Garcia RN          Reason for Disposition    Blood glucose > 400 mg/dL (22.2 mmol/L)    Additional Information    Negative: Unconscious or difficult to awaken    Negative: Acting confused (e.g., disoriented, slurred speech)    Negative: Very weak (e.g., can't stand)    Negative: Sounds like a life-threatening emergency to the triager    Negative: [1] Vomiting AND [2] signs of dehydration (e.g., very dry mouth, lightheaded, dark urine)    Negative: [1] Blood glucose > 240 mg/dL (13.3 mmol/L) AND [2] rapid breathing    Negative: Blood glucose > 500 mg/dL (27.8 mmol/L)    Negative: [1] Blood glucose > 240 mg/dL (13.3 mmol/L) AND [2] urine ketones moderate-large (or more than 1+)    Negative: [1] Blood glucose > 240 mg/dL (13.3 mmol/L) AND [2] blood ketones > 1.4 mmol/L    Negative: [1] Blood glucose > 240 mg/dL (13.3 mmol/L) AND [2] vomiting AND [3] unable to check for ketones (in blood or urine)    Negative: [1] New-onset diabetes suspected (e.g., frequent urination, weak, weight loss) AND [2] vomiting or rapid breathing    Negative: Vomiting lasts > 4 hours    Negative: Patient sounds very sick " or weak to the triager    Negative: Fever > 100.4 F (38.0 C)    Protocols used: DIABETES - HIGH BLOOD SUGAR-A-AH

## 2022-11-29 NOTE — ED PROVIDER NOTES
"  History     Chief Complaint   Patient presents with     Hyperglycemia     HPI  Heidi Su is a 37 year old female who presents to the ED this morning with concerns about possible DKA.  She is a type I diabetic and has an insulin pump.  She noticed that her site went bad so she has not been receiving insulin through the night.  First noticed at around 11:30 PM.  Her blood sugar was 390.  Once he gets above 400 her monitor just reads \"high\".  Its been high ever since.  She woke up at about 330 this morning because of nausea and then noticed a fruity taste in the mouth which she recognized as ketones.  She was hospitalized for several days in early September of this year with DKA.  She changed her insulin pump site at about 430 and now it appears to be working.  She has a basal rate of 1 unit an hour and she is getting a bolus of 12.35 units at the current time.  She denies any recent illnesses.  Just had a hysterectomy and is slated to go back to work next week.  Recovering nicely from that.  No cough or sore throat.  No fevers chills or sweats.  No dysuria or hematuria.      Allergies:  Allergies   Allergen Reactions     No Known Allergies        Problem List:    Patient Active Problem List    Diagnosis Date Noted     CARDIOVASCULAR SCREENING; LDL GOAL LESS THAN 160 04/03/2012     Priority: Medium        Past Medical History:    Past Medical History:   Diagnosis Date     Anxiety      Bronchitis      Depressive disorder      Diabetes (H)      NO ACTIVE PROBLEMS      Ovarian cyst        Past Surgical History:    Past Surgical History:   Procedure Laterality Date     CYSTECTOMY OVARIAN BENIGN       DILATION AND CURETTAGE       DILATION AND CURETTAGE  2011     GYN SURGERY       HEMORRHOID SURGERY         Family History:    Family History   Problem Relation Age of Onset     Cerebrovascular Disease Maternal Grandfather      Breast Cancer Maternal Grandfather      Asthma Sister      Diabetes Sister 14       Social " History:  Marital Status:  Single [1]  Social History     Tobacco Use     Smoking status: Every Day     Packs/day: 0.25     Types: Cigarettes     Smokeless tobacco: Never   Substance Use Topics     Alcohol use: Yes     Comment: 1-2x/month     Drug use: No        Medications:    acetaminophen (TYLENOL) 500 MG tablet  albuterol (2.5 MG/3ML) 0.083% nebulizer solution  albuterol (ALBUTEROL) 108 (90 BASE) MCG/ACT Inhaler  amoxicillin (AMOXIL) 500 MG capsule  amphetamine-dextroamphetamine (ADDERALL) 10 MG tablet  amphetamine-dextroamphetamine (ADDERALL) 15 MG tablet  blood glucose (CONTOUR NEXT TEST) test strip  clonazePAM (KLONOPIN) 0.5 MG tablet  Continuous Blood Gluc Sensor (DEXCOM G6 SENSOR) MISC  Continuous Blood Gluc Transmit (DEXCOM G6 TRANSMITTER) MISC  etonogestrel (NEXPLANON) 68 MG IMPL  famotidine (PEPCID) 20 MG tablet  hydrOXYzine (ATARAX) 50 MG tablet  ibuprofen (ADVIL,MOTRIN) 200 MG tablet  insulin aspart (NOVOLOG VIAL) 100 UNITS/ML vial  lisdexamfetamine (VYVANSE) 40 MG capsule  methocarbamol (ROBAXIN) 750 MG tablet  oxyCODONE (ROXICODONE) 5 MG tablet  predniSONE (DELTASONE) 20 MG tablet          Review of Systems   All other systems reviewed and are negative.      Physical Exam   BP: (!) 169/100  Pulse: 105  Temp: 98  F (36.7  C)  Resp: 18  Weight: 72.6 kg (160 lb)  SpO2: 97 %      Physical Exam  Constitutional:       General: She is not in acute distress.     Appearance: Normal appearance.   HENT:      Mouth/Throat:      Mouth: Mucous membranes are moist.      Pharynx: Oropharynx is clear.   Eyes:      Extraocular Movements: Extraocular movements intact.   Cardiovascular:      Rate and Rhythm: Normal rate and regular rhythm.   Pulmonary:      Effort: Pulmonary effort is normal.      Breath sounds: Normal breath sounds.   Abdominal:      Palpations: Abdomen is soft.      Tenderness: There is no abdominal tenderness.      Comments: Laparoscopic port incision sites look clean   Musculoskeletal:          General: Normal range of motion.      Right lower leg: No edema.      Left lower leg: No edema.   Skin:     General: Skin is warm and dry.   Neurological:      General: No focal deficit present.      Mental Status: She is alert and oriented to person, place, and time.   Psychiatric:         Mood and Affect: Mood normal.         ED Course                 Procedures              Critical Care time:  none               No results found for this or any previous visit (from the past 24 hour(s)).    Medications   0.9% sodium chloride BOLUS (1,000 mLs Intravenous New Bag 11/29/22 0651)       Assessments & Plan (with Medical Decision Making)  Type I diabetic whose insulin pump site went bad overnight and her sugars have gone up.  She noted a fruity taste in her mouth and was worried about ketones.  Urine ketones were small to moderate.  She was in the ICU for several days in September of this year so is well tuned to what can happen.  She changed her site and is now getting a bolus of insulin.  IV placed.  Labs sent.  Fluid resuscitation begun.    Dr. Uriarte will assume her care at change of shift to follow-up for lab results and response to fluids and insulin.     I have reviewed the nursing notes.    I have reviewed the findings, diagnosis, plan and need for follow up with the patient.       New Prescriptions    No medications on file         11/29/2022   [facility] EMERGENCY DEPT     Patricio Campbell MD  11/29/22 0657

## 2022-11-29 NOTE — ED TRIAGE NOTES
Patient didn't realize insulin pump site went bad last night. She has been running blood sugars of 500-600 since about 2300. Site has been changed now but she is still running high.

## 2022-12-19 ENCOUNTER — HOSPITAL ENCOUNTER (EMERGENCY)
Facility: CLINIC | Age: 37
Discharge: HOME | End: 2022-12-19
Attending: PHYSICIAN ASSISTANT | Admitting: PHYSICIAN ASSISTANT
Payer: COMMERCIAL

## 2022-12-19 ENCOUNTER — APPOINTMENT (OUTPATIENT)
Dept: GENERAL RADIOLOGY | Facility: CLINIC | Age: 37
End: 2022-12-19
Attending: EMERGENCY MEDICINE
Payer: COMMERCIAL

## 2022-12-19 VITALS
HEIGHT: 65 IN | BODY MASS INDEX: 25.83 KG/M2 | OXYGEN SATURATION: 100 % | HEART RATE: 98 BPM | RESPIRATION RATE: 20 BRPM | TEMPERATURE: 97.3 F | WEIGHT: 155 LBS

## 2022-12-19 DIAGNOSIS — S62.308A CLOSED FRACTURE OF 5TH METACARPAL: ICD-10-CM

## 2022-12-19 PROCEDURE — 73130 X-RAY EXAM OF HAND: CPT | Mod: LT

## 2022-12-19 PROCEDURE — 26600 TREAT METACARPAL FRACTURE: CPT | Mod: 54 | Performed by: PHYSICIAN ASSISTANT

## 2022-12-19 PROCEDURE — 26600 TREAT METACARPAL FRACTURE: CPT | Mod: F4 | Performed by: PHYSICIAN ASSISTANT

## 2022-12-19 PROCEDURE — 99284 EMERGENCY DEPT VISIT MOD MDM: CPT | Mod: 25 | Performed by: PHYSICIAN ASSISTANT

## 2022-12-19 RX ORDER — OXYCODONE HYDROCHLORIDE 5 MG/1
5 TABLET ORAL EVERY 6 HOURS PRN
Qty: 8 TABLET | Refills: 0 | Status: SHIPPED | OUTPATIENT
Start: 2022-12-19 | End: 2022-12-22

## 2022-12-19 NOTE — ED PROVIDER NOTES
History     Chief Complaint   Patient presents with     Hand Injury     HPI  Heidi Su is a 37 year old female who presents for evaluation of a left hand injury.  She states that her hand was on the door frame when her son accidentally closed the door on her hand.  She describes pain of the third-fourth-fifth metacarpal region.  Denies any numbness or tingling.  Pain is throbbing in nature.  Rates the pain 6 on a scale of 10.  She has taken ibuprofen and Tylenol for the pain.  Better with rest, worse with movement.  No fevers or chills.  Denies any numbness or tingling of the fingertips.  No open wound or bleeding per her report.      Allergies:  Allergies   Allergen Reactions     No Known Allergies        Problem List:    Patient Active Problem List    Diagnosis Date Noted     CARDIOVASCULAR SCREENING; LDL GOAL LESS THAN 160 04/03/2012     Priority: Medium        Past Medical History:    Past Medical History:   Diagnosis Date     Anxiety      Bronchitis      Depressive disorder      Diabetes (H)      NO ACTIVE PROBLEMS      Ovarian cyst        Past Surgical History:    Past Surgical History:   Procedure Laterality Date     CYSTECTOMY OVARIAN BENIGN       DILATION AND CURETTAGE       DILATION AND CURETTAGE  2011     GYN SURGERY       HEMORRHOID SURGERY         Family History:    Family History   Problem Relation Age of Onset     Cerebrovascular Disease Maternal Grandfather      Breast Cancer Maternal Grandfather      Asthma Sister      Diabetes Sister 14       Social History:  Marital Status:  Single [1]  Social History     Tobacco Use     Smoking status: Every Day     Packs/day: 0.25     Types: Cigarettes     Smokeless tobacco: Never   Substance Use Topics     Alcohol use: Yes     Comment: 1-2x/month     Drug use: No        Medications:    oxyCODONE (ROXICODONE) 5 MG tablet  acetaminophen (TYLENOL) 500 MG tablet  albuterol (2.5 MG/3ML) 0.083% nebulizer solution  albuterol (ALBUTEROL) 108 (90 BASE) MCG/ACT  "Inhaler  amoxicillin (AMOXIL) 500 MG capsule  amphetamine-dextroamphetamine (ADDERALL) 10 MG tablet  amphetamine-dextroamphetamine (ADDERALL) 15 MG tablet  blood glucose (CONTOUR NEXT TEST) test strip  clonazePAM (KLONOPIN) 0.5 MG tablet  Continuous Blood Gluc Sensor (DEXCOM G6 SENSOR) MISC  Continuous Blood Gluc Transmit (DEXCOM G6 TRANSMITTER) MISC  etonogestrel (NEXPLANON) 68 MG IMPL  famotidine (PEPCID) 20 MG tablet  hydrOXYzine (ATARAX) 50 MG tablet  ibuprofen (ADVIL,MOTRIN) 200 MG tablet  insulin aspart (NOVOLOG VIAL) 100 UNITS/ML vial  lisdexamfetamine (VYVANSE) 40 MG capsule  methocarbamol (ROBAXIN) 750 MG tablet  predniSONE (DELTASONE) 20 MG tablet          Review of Systems   All other systems reviewed and are negative.      Physical Exam   Pulse: 98  Temp: 97.3  F (36.3  C)  Resp: 20  Height: 165.1 cm (5' 5\")  Weight: 70.3 kg (155 lb)  SpO2: 100 %      Physical Exam  Vitals and nursing note reviewed.   Constitutional:       General: She is not in acute distress.     Appearance: She is not diaphoretic.   HENT:      Head: Normocephalic and atraumatic.      Right Ear: External ear normal.      Left Ear: External ear normal.      Nose: Nose normal.      Mouth/Throat:      Mouth: Oropharynx is clear and moist.      Pharynx: No oropharyngeal exudate.   Eyes:      General: No scleral icterus.        Right eye: No discharge.         Left eye: No discharge.      Extraocular Movements: EOM normal.      Conjunctiva/sclera: Conjunctivae normal.      Pupils: Pupils are equal, round, and reactive to light.   Neck:      Thyroid: No thyromegaly.   Cardiovascular:      Rate and Rhythm: Normal rate and regular rhythm.      Heart sounds: Normal heart sounds. No murmur heard.  Pulmonary:      Effort: Pulmonary effort is normal. No respiratory distress.      Breath sounds: Normal breath sounds. No wheezing or rales.   Chest:      Chest wall: No tenderness.   Abdominal:      General: Bowel sounds are normal. There is no " distension.      Palpations: Abdomen is soft. There is no mass.      Tenderness: There is no abdominal tenderness. There is no guarding or rebound.   Musculoskeletal:         General: No deformity or edema. Normal range of motion.      Cervical back: Normal range of motion and neck supple.      Comments: Left hand with some subtle bruising over the fifth metacarpal, mid/distal fourth metacarpal area, and the distal third metacarpal area.  No finger involvement.  Normal range of motion of the fingers.  Sensation intact light touch.  Distal cap refill less than 2 seconds.  No sign of laceration or open wounds.  Significant tenderness over the bruised areas, but most of the tenderness is of the proximal fifth metacarpal.  Wrist nontender.  Normal range of motion of the wrist.  No snuffbox tenderness.   Lymphadenopathy:      Cervical: No cervical adenopathy.   Skin:     General: Skin is warm and dry.      Capillary Refill: Capillary refill takes less than 2 seconds.      Findings: No erythema or rash.   Neurological:      Mental Status: She is alert and oriented to person, place, and time.      Cranial Nerves: No cranial nerve deficit.   Psychiatric:         Mood and Affect: Mood and affect normal.         Behavior: Behavior normal.         Thought Content: Thought content normal.         ED Course                 Procedures              Critical Care time:  none               Results for orders placed or performed during the hospital encounter of 12/19/22 (from the past 24 hour(s))   XR Hand Left G/E 3 Views    Narrative    XR LEFT HAND THREE OR MORE VIEWS  12/19/2022 12:36 PM     INDICATION: Left hand pain after trauma.   COMPARISON: None.      Impression    IMPRESSION:  1.  There is minimal irregularity of the radial margin of the fifth  metacarpal base. Although this could be secondary to normal overlap, a  subtle nondisplaced fracture could also have this appearance.  Correlation with point tenderness recommended.  This could be further  evaluated with follow-up radiographs or noncontrast CT.  2.  Normal joint spacing and alignment.  3.  Mild soft tissue swelling in the dorsal hand.    JOVANA HADDAD MD         SYSTEM ID:  [id number]       Medications - No data to display    Assessments & Plan (with Medical Decision Making)  Closed fracture of 5th metacarpal     37 year old female presents for evaluation of a hand injury that occurred yesterday.  She accidentally got her hand closed in a car door.  Reports third/fourth/fifth metacarpal area discomfort.  See HPI above for details.  On exam temperature 97.3, pulse 98, respiration 20, oxygen saturation 100% on room air.  Patient is in no acute distress.  She has tenderness most notably of the base of the fifth metacarpal.  Also has some mid/distal fourth and third metacarpal discomfort.  Normal range of motion.  Cap refill less than 2 seconds.  Sensation intact light touch.  No sign of open laceration or skin injury.  Wrist without tenderness or abnormality.  X-ray shows a nondisplaced subtle fracture of the radial aspect of the proximal fifth metacarpal.    Patient was placed in an ulnar gutter Ortho-Glass splint by the ED tech.  CMS intact afterwards.  Patient requested pain medication for discharge.  She has been receiving oxycodone after her recent hysterectomy which has not given her any side effects.  I did agree to give her #8 tablets of oxycodone for severe breakthrough pain but also discussed that she should be using ibuprofen, Tylenol, elevation, and icing first.  Orthopedic follow-up in 1 week recommended and orthopedic  referral placed.  Indications for ED return reviewed with the patient.  She needs to wear the splint at all times.  Patient was in agreement.     I have reviewed the nursing notes.    I have reviewed the findings, diagnosis, plan and need for follow up with the patient.       New Prescriptions    OXYCODONE (ROXICODONE) 5 MG TABLET    Take 1  tablet (5 mg) by mouth every 6 hours as needed for pain       Final diagnoses:   Closed fracture of 5th metacarpal     Disclaimer: This note consists of symbols derived from keyboarding, dictation and/or voice recognition software. As a result, there may be errors in the script that have gone undetected. Please consider this when interpreting information found in this chart.      12/19/2022   [facility] EMERGENCY DEPT     Drake Cheek PA-C  12/19/22 [id number]

## 2022-12-19 NOTE — ED TRIAGE NOTES
Patient presents with concerns for L) hand pain since Sun slammed it in the car door yesterday. Hand appears swollen and bruised. Charlene Linares RN

## 2022-12-26 NOTE — PROGRESS NOTES
Heidi Su  :  1985  DOS: 2023  MRN: [id number]  PCP: Debra Larson    Sports Medicine Clinic Visit     HPI  Heidi Su is a 37 year old female who is seen in consultation at the request of  Drake Cheek PA-C presenting with a left hand fracture.    Mechanism of Injury: Hand was accidentally slammed in a car door when her son closed it.   Duration and progression of pain: 2 weeks  Location of pain: Left 5th finger  Radiation: No  Swelling: No swelling today  Instability: None  Mechanical symptoms: No mechanical symptoms  Numbness/Tingling: None  Radicular pain: None  Weakness: Weakness dung to pain  Other symptoms: Patient hand a remained tender to touch    Alleviating factors: Nothing  Aggravating factors: Accidentally bumping the hand, use of the left hand  Treatments tried (medications, injections, bracing, therapy, modalities): ice, ortho glasss splint, NSAIDs, Tylenol, Oxycodone, rest    Prior medical visits related to complaint:   - Seen in the ED on 2022.  Radiograph showed a very small subtle cortical irregularity at the radial margin of the fifth metacarpal base, may also be normal overlap.  She is placed in an ulnar gutter Ortho-Glass splint.  Was given 8 tabs of oxycodone.  Otherwise using ibuprofen, Tylenol, RICE.    Pertinent past medical history: None  Social History: currently employed as a  at [employer]    Review of Systems  Musculoskeletal: as above  Remainder of review of systems is negative including constitutional, CV, pulmonary, GI, Skin and Neurologic except as noted in HPI or medical history.    Past Medical History:   Diagnosis Date     Anxiety      Bronchitis      Depressive disorder      Diabetes (H)      NO ACTIVE PROBLEMS      Ovarian cyst      Past Surgical History:   Procedure Laterality Date     CYSTECTOMY OVARIAN BENIGN       DILATION AND CURETTAGE       DILATION AND CURETTAGE       GYN SURGERY       HEMORRHOID SURGERY       Family  "History   Problem Relation Age of Onset     Cerebrovascular Disease Maternal Grandfather      Breast Cancer Maternal Grandfather      Asthma Sister      Diabetes Sister 14       Objective  /85   Pulse 103   Ht 1.651 m (5' 5\")   Wt 70.3 kg (155 lb)   BMI 25.79 kg/m        General: healthy, alert and in no acute distress      HEENT: no scleral icterus or conjunctival erythema     Skin: no suspicious lesions or rash. No jaundice.     CV: regular rhythm by palpation, 2+ distal pulses, no pedal edema      Resp: normal respiratory effort without conversational dyspnea     Psych: normal mood and affect      Gait: nonantalgic, appropriate coordination and balance     Neuro:        - Sensation to light touch:  Intact throughout the BUE including all peripheral nerve distributions.     MSK - Wrist/Hand:       - Inspection:    - Mild swelling in the dorsal medial left hand with small amount of healing ecchymosis.  No significant erythema, warmth, lesion.       - ROM:    - Limited in fifth digit finger flexion at the PIP and MCP due to pain.  Otherwise normal AROM throughout the rest of the hand.  - No significant rotation at rest or on finger flexion and handgrip.       - Palpation:    - TTP at the base of the fifth metacarpal, base of the fourth metacarpal and intermetacarpal space.  - NTTP elsewhere.        - Strength:    - 5/5 in BUE including Sab, SF, SIR, SER, EF, EE, Pron, Sup, WE, WF, FDI, ADM, FPL, APB, EPL.        - Special tests   - TFCC compression:  Neg   - Taylor's:  Neg    Radiology  I independently reviewed the available relevant imaging, with the following interpretation:   - XR L hand 12/19/2022 shows area of possibly a subtle nondisplaced fracture of the radial base of the fifth metacarpal, this may also be normal overlap from the x-ray angle.    I independently reviewed today's new relevant imaging, with the following interpretation:  - XR L hand again shows an area of questionable overlap versus " fracture, but there is no periosteal reaction or sclerosis present to indicate fracture healing.  No other fractures or dislocations present.        Assessment  1. Closed nondisplaced fracture of base of fifth metacarpal bone of left hand, initial encounter        Plan  Heidi Su is a 37 year old female that presents with left hand pain after getting it slammed in a car door.  She was evaluated in the ED that included radiographs that showed a subtle nondisplaced fracture of the radial aspect of the fifth metacarpal.  No distinct fracture line was evident, and questionable radiologic finding may have an overlap of the metacarpal bases.  Repeat x-ray today showed no evidence of fracture healing, some more likely that the original finding was metacarpal base overlap.  However, based on her level of pain and swelling and lack of range of motion, we will treat this conservatively as a subtle nondisplaced fracture with protective immobilization.  Discussed the nature of the condition and treatment options and mutually agreed upon the following plan:    - Imaging:         - Reviewed relevant imaging in the chart, including XR L hand ordered today pre-clinic. Results above. Reviewed imaging with patient.   - Medications:         - Discussed pharmacologic options for pain relief. May use NSAIDs as needed for pain control.  - Modalities:         - May use ice, heat, massage or other modalities PRN.        - Elevate the arm above the level of the heart as needed for swelling control.   - Bracing:         - Discussed bracing options and recommend using a rigid removable splint such as an Exos or Ortho-Glass splint in an ulnar gutter position.  Preference for Exos splint and the splint was applied today in clinic, instructed to wear at all times and remove only for hygiene.  - Activity:         - Encouraged to remain active and participate in regular activities as symptoms allow.  - Follow up:         - In 3 weeks for  repeat x-ray, re-evaluation and update to treatment plan. Patient has clinic contact information for questions or concerns.       DO ROSIBEL Rangel [facility] - Sports Medicine  [facility] - Department of Orthopedic Surgery

## 2023-01-02 ENCOUNTER — ANCILLARY PROCEDURE (OUTPATIENT)
Dept: GENERAL RADIOLOGY | Facility: CLINIC | Age: 38
End: 2023-01-02
Attending: STUDENT IN AN ORGANIZED HEALTH CARE EDUCATION/TRAINING PROGRAM
Payer: COMMERCIAL

## 2023-01-02 ENCOUNTER — OFFICE VISIT (OUTPATIENT)
Dept: ORTHOPEDICS | Facility: CLINIC | Age: 38
End: 2023-01-02
Attending: PHYSICIAN ASSISTANT
Payer: COMMERCIAL

## 2023-01-02 VITALS
DIASTOLIC BLOOD PRESSURE: 85 MMHG | BODY MASS INDEX: 25.83 KG/M2 | WEIGHT: 155 LBS | HEART RATE: 103 BPM | HEIGHT: 65 IN | SYSTOLIC BLOOD PRESSURE: 124 MMHG

## 2023-01-02 DIAGNOSIS — S62.347A CLOSED NONDISPLACED FRACTURE OF BASE OF FIFTH METACARPAL BONE OF LEFT HAND, INITIAL ENCOUNTER: Primary | ICD-10-CM

## 2023-01-02 DIAGNOSIS — S62.308A CLOSED FRACTURE OF 5TH METACARPAL: ICD-10-CM

## 2023-01-02 PROCEDURE — 73130 X-RAY EXAM OF HAND: CPT | Mod: TC | Performed by: RADIOLOGY

## 2023-01-02 PROCEDURE — 99204 OFFICE O/P NEW MOD 45 MIN: CPT | Performed by: STUDENT IN AN ORGANIZED HEALTH CARE EDUCATION/TRAINING PROGRAM

## 2023-01-02 ASSESSMENT — PAIN SCALES - GENERAL: PAINLEVEL: MODERATE PAIN (5)

## 2023-01-02 NOTE — LETTER
2023         RE: Heidi Su  [address]  [place] [address]        Dear Colleague,    Thank you for referring your patient, Heidi Su, to the [facility] [place]. Please see a copy of my visit note below.    Heidi Su  :  1985  DOS: 2023  MRN: [id number]  PCP: Debra Larson    Sports Medicine Clinic Visit     HPI  Heidi Su is a 37 year old female who is seen in consultation at the request of  Drake Cheek PA-C presenting with a left hand fracture.    Mechanism of Injury: Hand was accidentally slammed in a car door when her son closed it.   Duration and progression of pain: 2 weeks  Location of pain: Left 5th finger  Radiation: No  Swelling: No swelling today  Instability: None  Mechanical symptoms: No mechanical symptoms  Numbness/Tingling: None  Radicular pain: None  Weakness: Weakness dung to pain  Other symptoms: Patient hand a remained tender to touch    Alleviating factors: Nothing  Aggravating factors: Accidentally bumping the hand, use of the left hand  Treatments tried (medications, injections, bracing, therapy, modalities): ice, ortho glasss splint, NSAIDs, Tylenol, Oxycodone, rest    Prior medical visits related to complaint:   - Seen in the ED on 2022.  Radiograph showed a very small subtle cortical irregularity at the radial margin of the fifth metacarpal base, may also be normal overlap.  She is placed in an ulnar gutter Ortho-Glass splint.  Was given 8 tabs of oxycodone.  Otherwise using ibuprofen, Tylenol, RICE.    Pertinent past medical history: None  Social History: currently employed as a  at [employer]    Review of Systems  Musculoskeletal: as above  Remainder of review of systems is negative including constitutional, CV, pulmonary, GI, Skin and Neurologic except as noted in HPI or medical history.    Past Medical History:   Diagnosis Date     Anxiety      Bronchitis       "Depressive disorder      Diabetes (H)      NO ACTIVE PROBLEMS      Ovarian cyst      Past Surgical History:   Procedure Laterality Date     CYSTECTOMY OVARIAN BENIGN       DILATION AND CURETTAGE       DILATION AND CURETTAGE  2011     GYN SURGERY       HEMORRHOID SURGERY       Family History   Problem Relation Age of Onset     Cerebrovascular Disease Maternal Grandfather      Breast Cancer Maternal Grandfather      Asthma Sister      Diabetes Sister 14       Objective  /85   Pulse 103   Ht 1.651 m (5' 5\")   Wt 70.3 kg (155 lb)   BMI 25.79 kg/m        General: healthy, alert and in no acute distress      HEENT: no scleral icterus or conjunctival erythema     Skin: no suspicious lesions or rash. No jaundice.     CV: regular rhythm by palpation, 2+ distal pulses, no pedal edema      Resp: normal respiratory effort without conversational dyspnea     Psych: normal mood and affect      Gait: nonantalgic, appropriate coordination and balance     Neuro:        - Sensation to light touch:  Intact throughout the BUE including all peripheral nerve distributions.     MSK - Wrist/Hand:       - Inspection:    - Mild swelling in the dorsal medial left hand with small amount of healing ecchymosis.  No significant erythema, warmth, lesion.       - ROM:    - Limited in fifth digit finger flexion at the PIP and MCP due to pain.  Otherwise normal AROM throughout the rest of the hand.  - No significant rotation at rest or on finger flexion and handgrip.       - Palpation:    - TTP at the base of the fifth metacarpal, base of the fourth metacarpal and intermetacarpal space.  - NTTP elsewhere.        - Strength:    - 5/5 in BUE including Sab, SF, SIR, SER, EF, EE, Pron, Sup, WE, WF, FDI, ADM, FPL, APB, EPL.        - Special tests   - TFCC compression:  Neg   - Taylor's:  Neg    Radiology  I independently reviewed the available relevant imaging, with the following interpretation:   - XR L hand 12/19/2022 shows area of possibly a " subtle nondisplaced fracture of the radial base of the fifth metacarpal, this may also be normal overlap from the x-ray angle.    I independently reviewed today's new relevant imaging, with the following interpretation:  - XR L hand again shows an area of questionable overlap versus fracture, but there is no periosteal reaction or sclerosis present to indicate fracture healing.  No other fractures or dislocations present.        Assessment  1. Closed nondisplaced fracture of base of fifth metacarpal bone of left hand, initial encounter        Plan  Heidi Su is a 37 year old female that presents with left hand pain after getting it slammed in a car door.  She was evaluated in the ED that included radiographs that showed a subtle nondisplaced fracture of the radial aspect of the fifth metacarpal.  No distinct fracture line was evident, and questionable radiologic finding may have an overlap of the metacarpal bases.  Repeat x-ray today showed no evidence of fracture healing, some more likely that the original finding was metacarpal base overlap.  However, based on her level of pain and swelling and lack of range of motion, we will treat this conservatively as a subtle nondisplaced fracture with protective immobilization.  Discussed the nature of the condition and treatment options and mutually agreed upon the following plan:    - Imaging:         - Reviewed relevant imaging in the chart, including XR L hand ordered today pre-clinic. Results above. Reviewed imaging with patient.   - Medications:         - Discussed pharmacologic options for pain relief. May use NSAIDs as needed for pain control.  - Modalities:         - May use ice, heat, massage or other modalities PRN.        - Elevate the arm above the level of the heart as needed for swelling control.   - Bracing:         - Discussed bracing options and recommend using a rigid removable splint such as an Exos or Ortho-Glass splint in an ulnar gutter position.   Preference for Exos splint and the splint was applied today in clinic, instructed to wear at all times and remove only for hygiene.  - Activity:         - Encouraged to remain active and participate in regular activities as symptoms allow.  - Follow up:         - In 3 weeks for repeat x-ray, re-evaluation and update to treatment plan. Patient has clinic contact information for questions or concerns.       Titi Gilbert DO  [facility] - Sports Medicine  [facility] - Department of Orthopedic Surgery         Again, thank you for allowing me to participate in the care of your patient.        Sincerely,        Titi Gilbert DO

## 2023-01-02 NOTE — PATIENT INSTRUCTIONS
Galina Su ,     You were seen today for your left hand pain secondary to a small nondisplaced fracture of the fifth metacarpal  As discussed in clinic, our treatment plan will focus on rest and protection, inflammation and pain control.  You may use non-steroidal anti-inflammatory (NSAID) medications as needed for pain relief.   Elevate the hand above the level of the heart for swelling.  You may try topical medications such as IcyHot, BioFreeze, Bengay, or Voltaren gel as well when out of the splint for hygiene, which may help your symptoms.   Discussed bracing options and recommend using a rigid removable splint such as an Exos or Ortho-Glass splint in an ulnar gutter position.  Exos splint applied today in clinic, wear at all times and remove only for hygiene.  Follow up in 3 weeks for repeat x-ray, re-evaluation and update to treatment plan.   Please call the clinic for any questions or concerns.    It was a pleasure seeing you today. Thank you for choosing [facility] for your care.     Sincerely,   Titi Gilbert DO  [facility] - Sports Medicine  [facility] - Department of Orthopedic Surgery

## 2023-01-14 ENCOUNTER — HEALTH MAINTENANCE LETTER (OUTPATIENT)
Age: 38
End: 2023-01-14

## 2023-01-19 ENCOUNTER — MYC MEDICAL ADVICE (OUTPATIENT)
Dept: ORTHOPEDICS | Facility: CLINIC | Age: 38
End: 2023-01-19
Payer: COMMERCIAL

## 2023-01-19 DIAGNOSIS — S62.347A CLOSED NONDISPLACED FRACTURE OF BASE OF FIFTH METACARPAL BONE OF LEFT HAND, INITIAL ENCOUNTER: Primary | ICD-10-CM

## 2023-01-19 NOTE — TELEPHONE ENCOUNTER
"Patient seen on 1/2/2023 in-office for a left base of the fifth metacarpal fracture. She is supposed to follow up with you next week but has not scheduled yet. Please see Dailysingle message from today:    \"Hi Titi,  I was seen today at the [place] er, I fell on the ice this morning and landed on my hand re injuring it and spraining my wrist from fall. I was told to contact primary dr or ortho about a prescription for pain medication for it. My primary is out of the office until Monday. Could you please send a prescription for pain meds to the [facility] as nothing is helping with the pain.\"    Please advise on next steps. Rita Calhoun, ATC        "

## 2023-01-19 NOTE — TELEPHONE ENCOUNTER
Valeriy Gordon,     I am sorry to hear that you had a fall and reinjured the hand.  Was there any new injury that was noticed in the ER on x-rays?  Any significant changes in your symptoms?    My primary concern would be getting you in for a re-evaluation after this fall, which would include repeat x-rays if we are unable to get the images from the [place] ER.      If there has been a new injury and you believe you need to be seen earlier than our scheduled follow-up, please contact the clinic to set up an appointment.    In the meantime, I can send a prescription for meloxicam to your preferred pharmacy.  Please let me know if you would like me to send this over.    Sincerely,   DO ROSIBEL Rangel [facility] - Sports Medicine  [facility] - Department of Orthopedic Surgery

## 2023-01-20 RX ORDER — MELOXICAM 15 MG/1
15 TABLET ORAL DAILY
Qty: 14 TABLET | Refills: 0 | Status: ON HOLD | OUTPATIENT
Start: 2023-01-20 | End: 2023-05-20

## 2023-03-11 ENCOUNTER — HOSPITAL ENCOUNTER (EMERGENCY)
Facility: CLINIC | Age: 38
Discharge: HOME | End: 2023-03-11
Attending: PHYSICIAN ASSISTANT | Admitting: PHYSICIAN ASSISTANT
Payer: COMMERCIAL

## 2023-03-11 VITALS
HEART RATE: 85 BPM | TEMPERATURE: 98.3 F | RESPIRATION RATE: 20 BRPM | OXYGEN SATURATION: 100 % | DIASTOLIC BLOOD PRESSURE: 88 MMHG | SYSTOLIC BLOOD PRESSURE: 150 MMHG

## 2023-03-11 DIAGNOSIS — M54.17 LUMBOSACRAL NEURITIS: ICD-10-CM

## 2023-03-11 PROCEDURE — 99284 EMERGENCY DEPT VISIT MOD MDM: CPT | Performed by: PHYSICIAN ASSISTANT

## 2023-03-11 RX ORDER — PREDNISONE 20 MG/1
TABLET ORAL
Qty: 15 TABLET | Refills: 0 | Status: ON HOLD | OUTPATIENT
Start: 2023-03-11 | End: 2023-05-20

## 2023-03-11 RX ORDER — OXYCODONE AND ACETAMINOPHEN 5; 325 MG/1; MG/1
1 TABLET ORAL EVERY 6 HOURS PRN
Qty: 12 TABLET | Refills: 0 | Status: SHIPPED | OUTPATIENT
Start: 2023-03-11 | End: 2023-03-14

## 2023-03-11 RX ORDER — METHOCARBAMOL 750 MG/1
750 TABLET, FILM COATED ORAL 4 TIMES DAILY PRN
Qty: 15 TABLET | Refills: 0 | Status: ON HOLD | OUTPATIENT
Start: 2023-03-11 | End: 2023-05-20

## 2023-03-11 NOTE — ED PROVIDER NOTES
History     Chief Complaint   Patient presents with     Back Pain     HPI  Heidi Su is a 37 year old female who presents for evaluation of low back pain increasing over the past 3 days.  No injury that she can recall.  She does only sedentary work.  No heavy lifting.  Started to feel pain in her back with radiation down the left lower extremity when she woke up 3 mornings ago.  Has progressively worsened to the point that she reports her left leg feeling heavy.  Has numbness and tingling posteriorly into the foot.  Denies any loss of bowel/bladder function.  No strength decreased.  No fevers or chills.  No IV drug use.  She has a long history of recurrent low back issues.  She reports having surgery in 2016 which sounds like she had a laminectomy.  Records of this are not available.  She denies any dysuria, frequency, urgency, flank pain, or gross hematuria.  Pain is rated 8 on a scale of 10.  She took 800 mg of ibuprofen prior to arrival with limited success.  Reports that it took her 2 hours to get out of bed this morning.  Denies any bowel issues.  No constipation or diarrhea.  No change in vaginal discharge.  No skin rashes.    Allergies:  Allergies   Allergen Reactions     No Known Allergies        Problem List:    Patient Active Problem List    Diagnosis Date Noted     CARDIOVASCULAR SCREENING; LDL GOAL LESS THAN 160 04/03/2012     Priority: Medium        Past Medical History:    Past Medical History:   Diagnosis Date     Anxiety      Bronchitis      Depressive disorder      Diabetes (H)      NO ACTIVE PROBLEMS      Ovarian cyst        Past Surgical History:    Past Surgical History:   Procedure Laterality Date     CYSTECTOMY OVARIAN BENIGN       DILATION AND CURETTAGE       DILATION AND CURETTAGE  2011     GYN SURGERY       HEMORRHOID SURGERY         Family History:    Family History   Problem Relation Age of Onset     Cerebrovascular Disease Maternal Grandfather      Breast Cancer Maternal  Grandfather      Asthma Sister      Diabetes Sister 14       Social History:  Marital Status:  Single [1]  Social History     Tobacco Use     Smoking status: Every Day     Packs/day: 0.25     Types: Cigarettes     Smokeless tobacco: Never   Substance Use Topics     Alcohol use: Yes     Comment: 1-2x/month     Drug use: No        Medications:    acetaminophen (TYLENOL) 500 MG tablet  ibuprofen (ADVIL,MOTRIN) 200 MG tablet  methocarbamol (ROBAXIN) 750 MG tablet  oxyCODONE-acetaminophen (PERCOCET) 5-325 MG tablet  predniSONE (DELTASONE) 20 MG tablet  albuterol (2.5 MG/3ML) 0.083% nebulizer solution  albuterol (ALBUTEROL) 108 (90 BASE) MCG/ACT Inhaler  amoxicillin (AMOXIL) 500 MG capsule  amphetamine-dextroamphetamine (ADDERALL) 10 MG tablet  amphetamine-dextroamphetamine (ADDERALL) 15 MG tablet  blood glucose (CONTOUR NEXT TEST) test strip  clonazePAM (KLONOPIN) 0.5 MG tablet  Continuous Blood Gluc Sensor (DEXCOM G6 SENSOR) MISC  Continuous Blood Gluc Transmit (DEXCOM G6 TRANSMITTER) MISC  etonogestrel (NEXPLANON) 68 MG IMPL  famotidine (PEPCID) 20 MG tablet  hydrOXYzine (ATARAX) 50 MG tablet  insulin aspart (NOVOLOG VIAL) 100 UNITS/ML vial  lisdexamfetamine (VYVANSE) 40 MG capsule  meloxicam (MOBIC) 15 MG tablet          Review of Systems   All other systems reviewed and are negative.      Physical Exam   BP: (!) 150/88  Pulse: 85  Temp: 98.3  F (36.8  C)  Resp: 20  SpO2: 100 %      Physical Exam  Vitals and nursing note reviewed.   Constitutional:       General: She is not in acute distress.     Appearance: She is not diaphoretic.   HENT:      Head: Normocephalic and atraumatic.      Right Ear: External ear normal.      Left Ear: External ear normal.      Nose: Nose normal.      Mouth/Throat:      Pharynx: No oropharyngeal exudate.   Eyes:      General: No scleral icterus.        Right eye: No discharge.         Left eye: No discharge.      Conjunctiva/sclera: Conjunctivae normal.      Pupils: Pupils are equal,  round, and reactive to light.   Neck:      Thyroid: No thyromegaly.   Cardiovascular:      Rate and Rhythm: Normal rate and regular rhythm.      Heart sounds: Normal heart sounds. No murmur heard.  Pulmonary:      Effort: Pulmonary effort is normal. No respiratory distress.      Breath sounds: Normal breath sounds. No wheezing or rales.   Chest:      Chest wall: No tenderness.   Abdominal:      General: Bowel sounds are normal. There is no distension.      Palpations: Abdomen is soft. There is no mass.      Tenderness: There is no abdominal tenderness. There is no guarding or rebound.   Musculoskeletal:         General: No deformity. Normal range of motion.      Cervical back: Normal range of motion and neck supple.      Comments: Lumbosacral spine area reveals no mass but there is tenderness of the paravertebral muscles. Full but painful lumbosacral range of motion is noted. Straight leg raise is positive at 45 degrees on both sides. DTR's, motor strength and sensation normal, including heel and toe gait.  Peripheral pulses are palpable. Hips and knees have full range of motion without pain.    Lymphadenopathy:      Cervical: No cervical adenopathy.   Skin:     General: Skin is warm and dry.      Capillary Refill: Capillary refill takes less than 2 seconds.      Findings: No erythema or rash.   Neurological:      Mental Status: She is alert and oriented to person, place, and time.      Cranial Nerves: No cranial nerve deficit.   Psychiatric:         Behavior: Behavior normal.         Thought Content: Thought content normal.         ED Course                 Procedures              Critical Care time:  none               No results found for this or any previous visit (from the past 24 hour(s)).    Medications - No data to display    Assessments & Plan (with Medical Decision Making)  Lumbosacral neuritis     37 year old female with a long history of chronic recurrent low back issues.  She underwent surgery in 2016.   No injury that she can recall with this current exacerbation.  Started 3 days ago.  She has numbness and tingling throughout the posterior aspect of the left lower extremity.  Leg feels heavy.  No red flag symptoms.  Not improving with ibuprofen.  See HPI above for details.  On exam blood pressure 150/88, temperature 98.3, pulse 85, respiration 20, oxygen saturation 100% on room air.  Patient is in no acute distress.  Slow with movement.  Tender to palpation to the paravertebral musculature of the lumbar spine.  No step-off or bruising.  No spinous process tenderness.  SLR positive at 45 degrees in the left.  No weakness identified.  Strength equal and appropriate.  DTRs 2-4 without clonus.  Sensation intact to light touch.  Distal pulses 2+.  Abdomen soft and nontender.  No recent trauma.  Therefore, no indication for x-ray evaluation.  No red flag symptoms and she does have a stable exam with intact strength, sensation, DTRs.  We will treat with conservative measures.  Rest recommended.  Alternate ice and heat.  Ibuprofen and Tylenol as needed for pain.  Start prednisone to treat the inflammation.  She has done well with a burst and taper in the past and will continue this at 20 mg twice daily for 5 days and then once daily for 5 days.  Possible side effects discussed.  Monitor blood sugars closely.  Adjust insulin levels to cover hyperglycemia.  Robaxin for muscle spasm.  Oxycodone reserved for severe breakthrough pain.  No driving for 8 hours after taking oxycodone given the sedation side effect.  Follow-up with primary care if symptoms not improving or worsening.  ED return instructions reviewed with the patient in detail.  She had to drive home, therefore she was not given anything for pain here in the ED.  Patient was okay with that.     I have reviewed the nursing notes.    I have reviewed the findings, diagnosis, plan and need for follow up with the patient.           Medical Decision Making  The patient's  presentation was of moderate complexity (a chronic illness mild to moderate exacerbation, progression, or side effect of treatment).    The patient's evaluation involved:  history and exam without other MDM data elements    The patient's management necessitated moderate risk (prescription drug management including medications given in the ED).        New Prescriptions    METHOCARBAMOL (ROBAXIN) 750 MG TABLET    Take 1 tablet (750 mg) by mouth 4 times daily as needed for muscle spasms    OXYCODONE-ACETAMINOPHEN (PERCOCET) 5-325 MG TABLET    Take 1 tablet by mouth every 6 hours as needed for pain    PREDNISONE (DELTASONE) 20 MG TABLET    1 tab twice daily x 5 days, then 1 tab once daily x 5 days       Final diagnoses:   Lumbosacral neuritis     Disclaimer: This note consists of symbols derived from keyboarding, dictation and/or voice recognition software. As a result, there may be errors in the script that have gone undetected. Please consider this when interpreting information found in this chart.      3/11/2023   [facility] EMERGENCY DEPT     Drake Cheek PA-C  03/11/23 1246

## 2023-03-11 NOTE — DISCHARGE INSTRUCTIONS
It was a pleasure working with you today!  I hope your condition improves rapidly!     Please REST!  Alternate ice and heat to your back for 20 minutes every couple hours.  Start some gentle physical therapy stretching techniques that you have learned from the past.  Start the prednisone right away to help with the inflammation.  Monitor your blood sugars closely while on the prednisone, as the sugars will likely be higher and you may need to adjust your insulin dosing.  You can use the Robaxin as needed for muscle spasm.  It is okay to take Tylenol 650 mg every 6 hours as needed for pain and ibuprofen 600 mg every 6 hours as needed for pain.  Reserve the oxycodone for severe breakthrough pain.  Do not drive for 8 hours after taking oxycodone, as it can impair your judgment.

## 2023-03-11 NOTE — ED TRIAGE NOTES
Pt reports a history of sciatica. States this episode started on Thursday and has been getting increasingly worse. States she has been taking ibuprofen without improvement.       Triage Assessment     Row Name 03/11/23 1156       Triage Assessment (Adult)    Airway WDL WDL       Respiratory WDL    Respiratory WDL WDL       Skin Circulation/Temperature WDL    Skin Circulation/Temperature WDL WDL       Cardiac WDL    Cardiac WDL WDL       Peripheral/Neurovascular WDL    Peripheral Neurovascular WDL WDL       Cognitive/Neuro/Behavioral WDL    Cognitive/Neuro/Behavioral WDL WDL

## 2023-03-24 ENCOUNTER — HOSPITAL ENCOUNTER (EMERGENCY)
Facility: CLINIC | Age: 38
Discharge: HOME | End: 2023-03-24
Attending: FAMILY MEDICINE | Admitting: FAMILY MEDICINE
Payer: COMMERCIAL

## 2023-03-24 ENCOUNTER — APPOINTMENT (OUTPATIENT)
Dept: ULTRASOUND IMAGING | Facility: CLINIC | Age: 38
End: 2023-03-24
Attending: FAMILY MEDICINE
Payer: COMMERCIAL

## 2023-03-24 VITALS
HEART RATE: 73 BPM | TEMPERATURE: 98 F | WEIGHT: 160 LBS | BODY MASS INDEX: 26.63 KG/M2 | OXYGEN SATURATION: 95 % | RESPIRATION RATE: 16 BRPM | SYSTOLIC BLOOD PRESSURE: 122 MMHG | DIASTOLIC BLOOD PRESSURE: 79 MMHG

## 2023-03-24 DIAGNOSIS — N93.9 VAGINAL BLEEDING: ICD-10-CM

## 2023-03-24 DIAGNOSIS — R10.2 PELVIC PAIN IN FEMALE: ICD-10-CM

## 2023-03-24 LAB
ALBUMIN UR-MCNC: NEGATIVE MG/DL
APPEARANCE UR: CLEAR
BILIRUB UR QL STRIP: NEGATIVE
COLOR UR AUTO: YELLOW
GLUCOSE UR STRIP-MCNC: 150 MG/DL
HGB UR QL STRIP: NEGATIVE
KETONES UR STRIP-MCNC: NEGATIVE MG/DL
LEUKOCYTE ESTERASE UR QL STRIP: NEGATIVE
NITRATE UR QL: NEGATIVE
PH UR STRIP: 5 [PH] (ref 5–7)
SP GR UR STRIP: 1.03 (ref 1–1.03)
UROBILINOGEN UR STRIP-MCNC: NORMAL MG/DL

## 2023-03-24 PROCEDURE — 99283 EMERGENCY DEPT VISIT LOW MDM: CPT | Performed by: FAMILY MEDICINE

## 2023-03-24 PROCEDURE — 76830 TRANSVAGINAL US NON-OB: CPT

## 2023-03-24 PROCEDURE — 99284 EMERGENCY DEPT VISIT MOD MDM: CPT | Mod: 25 | Performed by: FAMILY MEDICINE

## 2023-03-24 PROCEDURE — 81003 URINALYSIS AUTO W/O SCOPE: CPT | Performed by: FAMILY MEDICINE

## 2023-03-24 ASSESSMENT — ACTIVITIES OF DAILY LIVING (ADL): ADLS_ACUITY_SCORE: 35

## 2023-03-24 NOTE — LETTER
[facility]  Emergency Room  [address] [place]   [address]  Tel: [phone]   Fax: [phone]  2023    Heidi Su  [address]  [place] [address]  [phone] (home)     : 1985          To Whom it May Concern:    Heidi Su was seen in our ER today, 2023. I expect her condition to improve over the next 1-2 days.  Please excuse her from work today.  She may return to work, without restriction, when improved.      Please contact me for questions or concerns.    Sincerely,       Joshua Stephenson MD

## 2023-03-24 NOTE — ED TRIAGE NOTES
Pt reports hysterectomy in November. Pt has had two episodes this month of extreme cramping after intercourse. Blood noted to toilet paper. Pt also states she has had more formed bowel movements, hard stool. 2-3 days since last BM. Nausea. No emesis.      Triage Assessment     Row Name 03/24/23 0353       Triage Assessment (Adult)    Airway WDL WDL       Respiratory WDL    Respiratory WDL WDL       Skin Circulation/Temperature WDL    Skin Circulation/Temperature WDL WDL       Cardiac WDL    Cardiac WDL WDL       Peripheral/Neurovascular WDL    Peripheral Neurovascular WDL WDL       Cognitive/Neuro/Behavioral WDL    Cognitive/Neuro/Behavioral WDL WDL

## 2023-03-24 NOTE — ED PROVIDER NOTES
[facility] ED Provider Note   Patient: Heidi Su  MRN #:  [id number]  Date of Visit: March 24, 2023    CC:     Chief Complaint   Patient presents with     Abdominal Pain     HPI:  Heidi Su is a 37 year old female who presented to the emergency department with pelvic pain after intercourse tonight followed by some vaginal bleeding when she wiped.  Patient states that she had a complete hysterectomy in November 2022.  This is now 5 months postop since her surgery, and at the beginning of the month she had intercourse for the first time.  She reports that after intercourse she had severe pelvic pain and cramping that lasted almost an hour.  She was curled up in a fetal position, and then had some vaginal bleeding that resolved.  Tonight was the second time that she had intercourse, and she did not have any pain except toward the end of intercourse but then afterwards when she stood up, she started develop some severe cramping pains.  She went to the bathroom, and noticed a lot of bright red blood when she wiped.  She still rates her pain level 5-6 out of 10.  She has difficulty urinating now because of the severity of the pain.  Patient has a history of insulin-dependent diabetes.    Problem List:  Patient Active Problem List    Diagnosis Date Noted     CARDIOVASCULAR SCREENING; LDL GOAL LESS THAN 160 04/03/2012     Priority: Medium       Past Medical History:   Diagnosis Date     Anxiety      Bronchitis      Depressive disorder      Diabetes (H)      NO ACTIVE PROBLEMS      Ovarian cyst        MEDS: acetaminophen (TYLENOL) 500 MG tablet  albuterol (2.5 MG/3ML) 0.083% nebulizer solution  albuterol (ALBUTEROL) 108 (90 BASE) MCG/ACT Inhaler  amoxicillin (AMOXIL) 500 MG capsule  amphetamine-dextroamphetamine (ADDERALL) 10 MG tablet  amphetamine-dextroamphetamine (ADDERALL) 15 MG tablet  blood glucose (CONTOUR NEXT TEST) test strip  clonazePAM  (KLONOPIN) 0.5 MG tablet  Continuous Blood Gluc Sensor (DEXCOM G6 SENSOR) MISC  Continuous Blood Gluc Transmit (DEXCOM G6 TRANSMITTER) MISC  etonogestrel (NEXPLANON) 68 MG IMPL  famotidine (PEPCID) 20 MG tablet  hydrOXYzine (ATARAX) 50 MG tablet  ibuprofen (ADVIL,MOTRIN) 200 MG tablet  insulin aspart (NOVOLOG VIAL) 100 UNITS/ML vial  lisdexamfetamine (VYVANSE) 40 MG capsule  meloxicam (MOBIC) 15 MG tablet  methocarbamol (ROBAXIN) 750 MG tablet  predniSONE (DELTASONE) 20 MG tablet        ALLERGIES:    Allergies   Allergen Reactions     No Known Allergies        Past Surgical History:   Procedure Laterality Date     CYSTECTOMY OVARIAN BENIGN       DILATION AND CURETTAGE       DILATION AND CURETTAGE  2011     GYN SURGERY       HEMORRHOID SURGERY         Social History     Tobacco Use     Smoking status: Every Day     Packs/day: 0.25     Types: Cigarettes     Smokeless tobacco: Never   Substance Use Topics     Alcohol use: Yes     Comment: 1-2x/month     Drug use: No         Review of Systems   Except as noted in HPI, all other systems were reviewed and are negative    Physical Exam     Vitals were reviewed  Patient Vitals for the past 12 hrs:   BP Temp Temp src Pulse Resp SpO2 Weight   03/24/23 0418 -- 98  F (36.7  C) Oral -- -- -- --   03/24/23 0356 122/79 -- Oral 73 16 95 % 72.6 kg (160 lb)     GENERAL APPEARANCE: Alert and oriented x3,  FACE: normal facies  EYES: Pupils are equal  HENT: normal external exam  RESP: normal respiratory effort  ABD: soft, diffuse suprapubic tenderness  PELVIC: Patient is extremely tender on speculum exam.  The vaginal pouch is intact.  There is a scant amount of pink/slightly bloody fluid.  Bimanual exam reveals that the pouch is sound and intact.  Suture site is palpable and intact..  SKIN: no worrisome rash        Available Lab/Imaging Results     Results for orders placed or performed during the hospital encounter of 03/24/23 (from the past 24 hour(s))   US Pelvic Complete with  Transvaginal    Narrative    EXAM: US PELVIC TRANSABDOMINAL AND TRANSVAGINAL  LOCATION: [facility]  DATE/TIME: 3/24/2023 5:07 AM    INDICATION: 5 month status post hysterectomy; pelvic pain with vaginal bleeding spotting.  COMPARISON: None.  TECHNIQUE: Transabdominal scans were performed. Endovaginal ultrasound was performed to better visualize the adnexa.    FINDINGS:    Uterus is surgically absent. Neither ovary is identified due to overlying bowel gas. No adnexal masses or complex cysts are visualized. No free fluid in the pelvis.       Impression    IMPRESSION:  1.  PO hysterectomy.    2.  Neither ovary visualized. No adnexal masses or complex cysts are appreciated.    3.  No significant free fluid in the pelvis.                              Impression     Final diagnoses:   Pelvic pain   Vaginal bleeding         ED Course & Medical Decision Making   Heidi Su is a 37 year old female who presented to the emergency department with severe pelvic pain and some vaginal bleeding after intercourse tonight.  Patient states that this is the second time that she has had intercourse after her complete hysterectomy 5 months ago.  She had surgery with Dr. Nevarez at [facility].  Patient had intercourse for the first time at the beginning of the month, and this brought on severe pelvic pain for about an hour where she was curled up, followed by some vaginal bleeding.  Her episode resolved, and this was the second time that she had intercourse in our prior to arrival, and again this was followed by severe pelvic pain and vaginal bleeding when she wipes.  Patient was concerned as this is now happened twice.  She initially chalked up the first episode to resuming intercourse after surgery.  She has noted the last several days that her bowels have been irregular.  She typically goes every day but has not had a bowel movement for about 3 days.  She took some ibuprofen before coming  to the emergency department.  Vital signs reveal a blood pressure 122/79, heart rate of 73, respiratory rate of 16.  Abdominal exam reveals suprapubic tenderness.  Pelvic exam reveals pain with speculum exam.  There is a scant amount of pink to clear fluid.  Bimanual exam reveals that the vaginal pouch is intact.    Pelvic ultrasound reveals postoperative findings of hysterectomy.  Neither ovary is identified due to overlying bowel gas.  No adnexal masses or complex cysts.  No free fluid in the pelvis.      Written after-visit summary and instructions were given at the time of discharge.    Follow up Plan:   Roshni Nevarez MD  [address] [facility] [address]  [phone]    In 1 week        Discharge Instructions:   Your pelvic exam and ultrasound studies are reassuring.  Please follow-up with your gynecologist since this is the second time this is happened.  Discuss whether this is an expected complication of your surgery.  Begin MiraLAX 1 capful in a glass of water 2-3 times a day to help promote emptying of your bowels.       Disclaimer: This note consists of words and symbols derived from keyboarding and dictation using voice recognition software.  As a result, there may be errors that have gone undetected.  Please consider this when interpreting information found in this note.       Yoanna Stephenson MD  03/24/23 0511

## 2023-03-24 NOTE — DISCHARGE INSTRUCTIONS
Your pelvic exam and ultrasound studies are reassuring.  Please follow-up with your gynecologist,  since this is the second time this has happened.  Discuss whether this is an expected complication of your surgery.  Begin MiraLAX 1 capful in a glass of water 2-3 times a day to help promote emptying of your bowels.

## 2023-04-13 ENCOUNTER — HOSPITAL ENCOUNTER (EMERGENCY)
Facility: CLINIC | Age: 38
Discharge: HOME | End: 2023-04-13
Attending: EMERGENCY MEDICINE | Admitting: EMERGENCY MEDICINE
Payer: COMMERCIAL

## 2023-04-13 VITALS
TEMPERATURE: 97 F | OXYGEN SATURATION: 99 % | RESPIRATION RATE: 19 BRPM | HEART RATE: 92 BPM | DIASTOLIC BLOOD PRESSURE: 103 MMHG | SYSTOLIC BLOOD PRESSURE: 144 MMHG

## 2023-04-13 DIAGNOSIS — K08.89 PAIN, DENTAL: ICD-10-CM

## 2023-04-13 PROCEDURE — 99283 EMERGENCY DEPT VISIT LOW MDM: CPT | Performed by: EMERGENCY MEDICINE

## 2023-04-13 RX ORDER — OXYCODONE AND ACETAMINOPHEN 5; 325 MG/1; MG/1
1 TABLET ORAL EVERY 6 HOURS PRN
Qty: 12 TABLET | Refills: 0 | Status: SHIPPED | OUTPATIENT
Start: 2023-04-13 | End: 2023-04-16

## 2023-04-13 RX ORDER — IBUPROFEN 600 MG/1
600 TABLET, FILM COATED ORAL PRN
Status: ON HOLD | COMMUNITY
Start: 2022-11-09 | End: 2023-05-20

## 2023-04-13 ASSESSMENT — ENCOUNTER SYMPTOMS
COLOR CHANGE: 0
SHORTNESS OF BREATH: 0
EYE REDNESS: 0
NECK STIFFNESS: 0
ABDOMINAL PAIN: 0
CONFUSION: 0
ARTHRALGIAS: 0
HEADACHES: 0
DIFFICULTY URINATING: 0
FEVER: 0

## 2023-04-13 ASSESSMENT — ACTIVITIES OF DAILY LIVING (ADL): ADLS_ACUITY_SCORE: 33

## 2023-04-13 NOTE — DISCHARGE INSTRUCTIONS
You can continue with ibuprofen 600 mg 3 times daily.  For severe pain you have been given a prescription for Percocet.  1 tablet every 6-8 hours for severe pain.  Would recommend follow-up with your dentist as recommended.  Oral care as recommended by her dentist after numerous teeth have been extracted.

## 2023-04-13 NOTE — ED PROVIDER NOTES
History     Chief Complaint   Patient presents with     Dental Pain     HPI     Heidi Su is a 37 year old female who presents with dental pain.  She was seen by oral surgeon today who extracted 8 upper teeth in preparation for dentures.  She presents with severe dental pain.     Allergies:  Allergies   Allergen Reactions     No Known Allergies        Problem List:    Patient Active Problem List    Diagnosis Date Noted     CARDIOVASCULAR SCREENING; LDL GOAL LESS THAN 160 04/03/2012     Priority: Medium        Past Medical History:    Past Medical History:   Diagnosis Date     Anxiety      Bronchitis      Depressive disorder      Diabetes (H)      NO ACTIVE PROBLEMS      Ovarian cyst        Past Surgical History:    Past Surgical History:   Procedure Laterality Date     CYSTECTOMY OVARIAN BENIGN       DILATION AND CURETTAGE       DILATION AND CURETTAGE  2011     GYN SURGERY       HEMORRHOID SURGERY         Family History:    Family History   Problem Relation Age of Onset     Cerebrovascular Disease Maternal Grandfather      Breast Cancer Maternal Grandfather      Asthma Sister      Diabetes Sister 14       Social History:  Marital Status:  Single [1]  Social History     Tobacco Use     Smoking status: Every Day     Packs/day: 0.25     Types: Cigarettes     Smokeless tobacco: Never   Substance Use Topics     Alcohol use: Yes     Comment: 1-2x/month     Drug use: No        Medications:    acetaminophen (TYLENOL) 500 MG tablet  ibuprofen (ADVIL/MOTRIN) 600 MG tablet  oxyCODONE-acetaminophen (PERCOCET) 5-325 MG tablet  albuterol (2.5 MG/3ML) 0.083% nebulizer solution  albuterol (ALBUTEROL) 108 (90 BASE) MCG/ACT Inhaler  amoxicillin (AMOXIL) 500 MG capsule  amphetamine-dextroamphetamine (ADDERALL) 10 MG tablet  amphetamine-dextroamphetamine (ADDERALL) 15 MG tablet  blood glucose (CONTOUR NEXT TEST) test strip  clonazePAM (KLONOPIN) 0.5 MG tablet  Continuous Blood Gluc Sensor (DEXCOM G6 SENSOR) MISC  Continuous  Blood Gluc Transmit (DEXCOM G6 TRANSMITTER) MISC  etonogestrel (NEXPLANON) 68 MG IMPL  famotidine (PEPCID) 20 MG tablet  hydrOXYzine (ATARAX) 50 MG tablet  ibuprofen (ADVIL,MOTRIN) 200 MG tablet  insulin aspart (NOVOLOG VIAL) 100 UNITS/ML vial  lisdexamfetamine (VYVANSE) 40 MG capsule  meloxicam (MOBIC) 15 MG tablet  methocarbamol (ROBAXIN) 750 MG tablet  predniSONE (DELTASONE) 20 MG tablet          Review of Systems   Constitutional: Negative for fever.   HENT: Negative for congestion.    Eyes: Negative for redness.   Respiratory: Negative for shortness of breath.    Cardiovascular: Negative for chest pain.   Gastrointestinal: Negative for abdominal pain.   Genitourinary: Negative for difficulty urinating.   Musculoskeletal: Negative for arthralgias and neck stiffness.   Skin: Negative for color change.   Neurological: Negative for headaches.   Psychiatric/Behavioral: Negative for confusion.   All other systems reviewed and are negative.      Physical Exam   BP: (!) 144/103  Pulse: 92  Temp: 97  F (36.1  C)  Resp: 20  SpO2: 99 %      Physical Exam  Vitals and nursing note reviewed.     Numerous teeth extracted in the upper front.  No bleeding.    ED Course                 Procedures                  No results found for this or any previous visit (from the past 24 hour(s)).    Medications - No data to display    Assessments & Plan (with Medical Decision Making) severe tooth pain after extraction of a teeth.  No other complications other than just needing pain management.  Advised that she continue with either Tylenol and/or ibuprofen.  Did prescribe Percocet 5/325.  Appropriate narcotic warnings provided.     I have reviewed the nursing notes.    I have reviewed the findings, diagnosis, plan and need for follow up with the patient.                      New Prescriptions    OXYCODONE-ACETAMINOPHEN (PERCOCET) 5-325 MG TABLET    Take 1 tablet by mouth every 6 hours as needed for pain       Final diagnoses:   Pain,  dental - Numerous teeth extracted today       4/13/2023   [facility] EMERGENCY DEPT     Fabiano Uriarte,   04/13/23 1822

## 2023-04-13 NOTE — ED TRIAGE NOTES
"Pt reports having all her upper teeth removed this morning. States she is \"in so much pain\". Pt states she wasn't sent home with anything for pain.      Triage Assessment     Row Name 04/13/23 3918       Triage Assessment (Adult)    Airway WDL WDL       Respiratory WDL    Respiratory WDL WDL       Skin Circulation/Temperature WDL    Skin Circulation/Temperature WDL WDL       Cardiac WDL    Cardiac WDL WDL       Peripheral/Neurovascular WDL    Peripheral Neurovascular WDL WDL       Cognitive/Neuro/Behavioral WDL    Cognitive/Neuro/Behavioral WDL WDL              "

## 2023-04-23 ENCOUNTER — HEALTH MAINTENANCE LETTER (OUTPATIENT)
Age: 38
End: 2023-04-23

## 2023-05-19 ENCOUNTER — APPOINTMENT (OUTPATIENT)
Dept: CT IMAGING | Facility: CLINIC | Age: 38
End: 2023-05-19
Attending: EMERGENCY MEDICINE
Payer: COMMERCIAL

## 2023-05-19 ENCOUNTER — TELEPHONE (OUTPATIENT)
Dept: SURGERY | Facility: CLINIC | Age: 38
End: 2023-05-19

## 2023-05-19 ENCOUNTER — ANESTHESIA (OUTPATIENT)
Dept: SURGERY | Facility: CLINIC | Age: 38
End: 2023-05-19
Payer: COMMERCIAL

## 2023-05-19 ENCOUNTER — HOSPITAL ENCOUNTER (INPATIENT)
Facility: CLINIC | Age: 38
LOS: 3 days | Discharge: HOME | End: 2023-05-22
Attending: EMERGENCY MEDICINE | Admitting: SPECIALIST
Payer: COMMERCIAL

## 2023-05-19 ENCOUNTER — ANESTHESIA EVENT (OUTPATIENT)
Dept: SURGERY | Facility: CLINIC | Age: 38
End: 2023-05-19
Payer: COMMERCIAL

## 2023-05-19 DIAGNOSIS — R10.0 SURGICAL ABDOMEN: ICD-10-CM

## 2023-05-19 DIAGNOSIS — G89.18 ACUTE POST-OPERATIVE PAIN: ICD-10-CM

## 2023-05-19 DIAGNOSIS — K66.8 FREE INTRAPERITONEAL AIR: Primary | ICD-10-CM

## 2023-05-19 PROBLEM — E87.20 METABOLIC ACIDOSIS, NORMAL ANION GAP (NAG): Status: ACTIVE | Noted: 2023-05-19

## 2023-05-19 PROBLEM — Z96.41 INSULIN PUMP STATUS: Status: ACTIVE | Noted: 2023-05-19

## 2023-05-19 PROBLEM — R65.10 SIRS (SYSTEMIC INFLAMMATORY RESPONSE SYNDROME) (H): Status: ACTIVE | Noted: 2023-05-19

## 2023-05-19 PROBLEM — I82.811 THROMBOSIS OF RIGHT SAPHENOUS VEIN: Status: ACTIVE | Noted: 2023-05-19

## 2023-05-19 PROBLEM — E10.65 TYPE 1 DIABETES MELLITUS WITH HYPERGLYCEMIA (H): Status: ACTIVE | Noted: 2023-05-19

## 2023-05-19 PROBLEM — F90.9 ADHD (ATTENTION DEFICIT HYPERACTIVITY DISORDER): Status: ACTIVE | Noted: 2023-05-19

## 2023-05-19 PROBLEM — F41.8 DEPRESSION WITH ANXIETY: Status: ACTIVE | Noted: 2023-05-19

## 2023-05-19 LAB
ANION GAP SERPL CALCULATED.3IONS-SCNC: 10 MMOL/L (ref 7–15)
ANION GAP SERPL CALCULATED.3IONS-SCNC: 10 MMOL/L (ref 7–15)
ANION GAP SERPL CALCULATED.3IONS-SCNC: 15 MMOL/L (ref 7–15)
B-OH-BUTYR SERPL-SCNC: 0.5 MMOL/L
B-OH-BUTYR SERPL-SCNC: <0.18 MMOL/L
BASE EXCESS BLDV CALC-SCNC: -3.9 MMOL/L (ref -7.7–1.9)
BASE EXCESS BLDV CALC-SCNC: -6 MMOL/L (ref -7.7–1.9)
BASE EXCESS BLDV CALC-SCNC: -6.1 MMOL/L (ref -7.7–1.9)
BASOPHILS # BLD AUTO: 0.1 10E3/UL (ref 0–0.2)
BASOPHILS NFR BLD AUTO: 0 %
BUN SERPL-MCNC: 5.4 MG/DL (ref 6–20)
CALCIUM SERPL-MCNC: 9.5 MG/DL (ref 8.6–10)
CHLORIDE SERPL-SCNC: 100 MMOL/L (ref 98–107)
CHLORIDE SERPL-SCNC: 107 MMOL/L (ref 98–107)
CHLORIDE SERPL-SCNC: 108 MMOL/L (ref 98–107)
CREAT SERPL-MCNC: 0.55 MG/DL (ref 0.51–0.95)
CREAT SERPL-MCNC: 0.58 MG/DL (ref 0.51–0.95)
D DIMER PPP FEU-MCNC: 0.76 UG/ML FEU (ref 0–0.5)
DEPRECATED HCO3 PLAS-SCNC: 19 MMOL/L (ref 22–29)
DEPRECATED HCO3 PLAS-SCNC: 20 MMOL/L (ref 22–29)
DEPRECATED HCO3 PLAS-SCNC: 21 MMOL/L (ref 22–29)
EOSINOPHIL # BLD AUTO: 0.1 10E3/UL (ref 0–0.7)
EOSINOPHIL NFR BLD AUTO: 1 %
ERYTHROCYTE [DISTWIDTH] IN BLOOD BY AUTOMATED COUNT: 13.9 % (ref 10–15)
GFR SERPL CREATININE-BSD FRML MDRD: >90 ML/MIN/1.73M2
GFR SERPL CREATININE-BSD FRML MDRD: >90 ML/MIN/1.73M2
GLUCOSE BLDC GLUCOMTR-MCNC: 100 MG/DL (ref 70–99)
GLUCOSE BLDC GLUCOMTR-MCNC: 104 MG/DL (ref 70–99)
GLUCOSE BLDC GLUCOMTR-MCNC: 106 MG/DL (ref 70–99)
GLUCOSE BLDC GLUCOMTR-MCNC: 133 MG/DL (ref 70–99)
GLUCOSE BLDC GLUCOMTR-MCNC: 139 MG/DL (ref 70–99)
GLUCOSE BLDC GLUCOMTR-MCNC: 149 MG/DL (ref 70–99)
GLUCOSE BLDC GLUCOMTR-MCNC: 218 MG/DL (ref 70–99)
GLUCOSE BLDC GLUCOMTR-MCNC: 234 MG/DL (ref 70–99)
GLUCOSE BLDC GLUCOMTR-MCNC: 245 MG/DL (ref 70–99)
GLUCOSE BLDC GLUCOMTR-MCNC: 90 MG/DL (ref 70–99)
GLUCOSE SERPL-MCNC: 200 MG/DL (ref 70–99)
GLUCOSE SERPL-MCNC: 300 MG/DL (ref 70–99)
HCO3 BLDV-SCNC: 20 MMOL/L (ref 21–28)
HCO3 BLDV-SCNC: 22 MMOL/L (ref 21–28)
HCO3 BLDV-SCNC: 23 MMOL/L (ref 21–28)
HCT VFR BLD AUTO: 42.4 % (ref 35–47)
HGB BLD-MCNC: 14.1 G/DL (ref 11.7–15.7)
HOLD SPECIMEN: NORMAL
HOLD SPECIMEN: NORMAL
IMM GRANULOCYTES # BLD: 0.1 10E3/UL
IMM GRANULOCYTES NFR BLD: 1 %
LYMPHOCYTES # BLD AUTO: 2.4 10E3/UL (ref 0.8–5.3)
LYMPHOCYTES NFR BLD AUTO: 16 %
MCH RBC QN AUTO: 30.2 PG (ref 26.5–33)
MCHC RBC AUTO-ENTMCNC: 33.3 G/DL (ref 31.5–36.5)
MCV RBC AUTO: 91 FL (ref 78–100)
MONOCYTES # BLD AUTO: 0.8 10E3/UL (ref 0–1.3)
MONOCYTES NFR BLD AUTO: 6 %
NEUTROPHILS # BLD AUTO: 11.6 10E3/UL (ref 1.6–8.3)
NEUTROPHILS NFR BLD AUTO: 76 %
NRBC # BLD AUTO: 0 10E3/UL
NRBC BLD AUTO-RTO: 0 /100
O2/TOTAL GAS SETTING VFR VENT: 21 %
PCO2 BLDV: 43 MM HG (ref 40–50)
PCO2 BLDV: 50 MM HG (ref 40–50)
PCO2 BLDV: 52 MM HG (ref 40–50)
PH BLDV: 7.23 [PH] (ref 7.32–7.43)
PH BLDV: 7.28 [PH] (ref 7.32–7.43)
PH BLDV: 7.28 [PH] (ref 7.32–7.43)
PLATELET # BLD AUTO: 340 10E3/UL (ref 150–450)
PO2 BLDV: 25 MM HG (ref 25–47)
PO2 BLDV: 26 MM HG (ref 25–47)
PO2 BLDV: 46 MM HG (ref 25–47)
POTASSIUM SERPL-SCNC: 3.4 MMOL/L (ref 3.4–5.3)
POTASSIUM SERPL-SCNC: 3.6 MMOL/L (ref 3.4–5.3)
POTASSIUM SERPL-SCNC: 3.9 MMOL/L (ref 3.4–5.3)
RBC # BLD AUTO: 4.67 10E6/UL (ref 3.8–5.2)
SODIUM SERPL-SCNC: 134 MMOL/L (ref 136–145)
SODIUM SERPL-SCNC: 138 MMOL/L (ref 136–145)
SODIUM SERPL-SCNC: 138 MMOL/L (ref 136–145)
TROPONIN T SERPL HS-MCNC: <6 NG/L
WBC # BLD AUTO: 15.1 10E3/UL (ref 4–11)

## 2023-05-19 PROCEDURE — 250N000011 HC RX IP 250 OP 636: Performed by: EMERGENCY MEDICINE

## 2023-05-19 PROCEDURE — 88304 TISSUE EXAM BY PATHOLOGIST: CPT | Mod: TC | Performed by: SPECIALIST

## 2023-05-19 PROCEDURE — C9113 INJ PANTOPRAZOLE SODIUM, VIA: HCPCS | Performed by: PEDIATRICS

## 2023-05-19 PROCEDURE — 44955 APPENDECTOMY ADD-ON: CPT | Mod: 80 | Performed by: SURGERY

## 2023-05-19 PROCEDURE — 250N000009 HC RX 250: Performed by: NURSE ANESTHETIST, CERTIFIED REGISTERED

## 2023-05-19 PROCEDURE — 99285 EMERGENCY DEPT VISIT HI MDM: CPT | Mod: 25 | Performed by: EMERGENCY MEDICINE

## 2023-05-19 PROCEDURE — 250N000009 HC RX 250: Performed by: SPECIALIST

## 2023-05-19 PROCEDURE — 272N000001 HC OR GENERAL SUPPLY STERILE: Performed by: SPECIALIST

## 2023-05-19 PROCEDURE — 82565 ASSAY OF CREATININE: CPT | Performed by: SPECIALIST

## 2023-05-19 PROCEDURE — 36415 COLL VENOUS BLD VENIPUNCTURE: CPT | Performed by: EMERGENCY MEDICINE

## 2023-05-19 PROCEDURE — 360N000076 HC SURGERY LEVEL 3, PER MIN: Performed by: SPECIALIST

## 2023-05-19 PROCEDURE — 250N000011 HC RX IP 250 OP 636: Performed by: NURSE ANESTHETIST, CERTIFIED REGISTERED

## 2023-05-19 PROCEDURE — 250N000011 HC RX IP 250 OP 636: Performed by: PEDIATRICS

## 2023-05-19 PROCEDURE — 80051 ELECTROLYTE PANEL: CPT | Performed by: PEDIATRICS

## 2023-05-19 PROCEDURE — 82803 BLOOD GASES ANY COMBINATION: CPT | Performed by: PEDIATRICS

## 2023-05-19 PROCEDURE — 88304 TISSUE EXAM BY PATHOLOGIST: CPT | Mod: 26

## 2023-05-19 PROCEDURE — 250N000012 HC RX MED GY IP 250 OP 636 PS 637: Performed by: PEDIATRICS

## 2023-05-19 PROCEDURE — 96365 THER/PROPH/DIAG IV INF INIT: CPT | Mod: 59

## 2023-05-19 PROCEDURE — 258N000003 HC RX IP 258 OP 636: Performed by: PEDIATRICS

## 2023-05-19 PROCEDURE — 85379 FIBRIN DEGRADATION QUANT: CPT | Performed by: EMERGENCY MEDICINE

## 2023-05-19 PROCEDURE — 258N000003 HC RX IP 258 OP 636: Performed by: NURSE ANESTHETIST, CERTIFIED REGISTERED

## 2023-05-19 PROCEDURE — 250N000013 HC RX MED GY IP 250 OP 250 PS 637: Performed by: PEDIATRICS

## 2023-05-19 PROCEDURE — 36415 COLL VENOUS BLD VENIPUNCTURE: CPT | Performed by: SPECIALIST

## 2023-05-19 PROCEDURE — 44602 SUTURE SMALL INTESTINE: CPT | Performed by: SPECIALIST

## 2023-05-19 PROCEDURE — 71275 CT ANGIOGRAPHY CHEST: CPT

## 2023-05-19 PROCEDURE — 85025 COMPLETE CBC W/AUTO DIFF WBC: CPT | Performed by: EMERGENCY MEDICINE

## 2023-05-19 PROCEDURE — 99222 1ST HOSP IP/OBS MODERATE 55: CPT | Mod: 57 | Performed by: SPECIALIST

## 2023-05-19 PROCEDURE — 36415 COLL VENOUS BLD VENIPUNCTURE: CPT | Performed by: PEDIATRICS

## 2023-05-19 PROCEDURE — 250N000011 HC RX IP 250 OP 636: Performed by: SPECIALIST

## 2023-05-19 PROCEDURE — 84484 ASSAY OF TROPONIN QUANT: CPT | Performed by: EMERGENCY MEDICINE

## 2023-05-19 PROCEDURE — 99291 CRITICAL CARE FIRST HOUR: CPT | Mod: 25

## 2023-05-19 PROCEDURE — 93005 ELECTROCARDIOGRAM TRACING: CPT

## 2023-05-19 PROCEDURE — 82010 KETONE BODYS QUAN: CPT | Performed by: PEDIATRICS

## 2023-05-19 PROCEDURE — 250N000026 HC DESFLURANE, PER MIN: Performed by: SPECIALIST

## 2023-05-19 PROCEDURE — 80048 BASIC METABOLIC PNL TOTAL CA: CPT | Performed by: EMERGENCY MEDICINE

## 2023-05-19 PROCEDURE — 250N000009 HC RX 250: Performed by: EMERGENCY MEDICINE

## 2023-05-19 PROCEDURE — 271N000001 HC OR GENERAL SUPPLY NON-STERILE: Performed by: SPECIALIST

## 2023-05-19 PROCEDURE — 96361 HYDRATE IV INFUSION ADD-ON: CPT

## 2023-05-19 PROCEDURE — 44602 SUTURE SMALL INTESTINE: CPT | Mod: 80 | Performed by: SURGERY

## 2023-05-19 PROCEDURE — C9290 INJ, BUPIVACAINE LIPOSOME: HCPCS | Performed by: NURSE ANESTHETIST, CERTIFIED REGISTERED

## 2023-05-19 PROCEDURE — 258N000003 HC RX IP 258 OP 636: Performed by: SPECIALIST

## 2023-05-19 PROCEDURE — 82947 ASSAY GLUCOSE BLOOD QUANT: CPT | Performed by: PEDIATRICS

## 2023-05-19 PROCEDURE — 999N000141 HC STATISTIC PRE-PROCEDURE NURSING ASSESSMENT: Performed by: SPECIALIST

## 2023-05-19 PROCEDURE — 0WJG4ZZ INSPECTION OF PERITONEAL CAVITY, PERCUTANEOUS ENDOSCOPIC APPROACH: ICD-10-PCS | Performed by: PEDIATRICS

## 2023-05-19 PROCEDURE — 99223 1ST HOSP IP/OBS HIGH 75: CPT | Mod: AI | Performed by: PEDIATRICS

## 2023-05-19 PROCEDURE — 93010 ELECTROCARDIOGRAM REPORT: CPT | Performed by: EMERGENCY MEDICINE

## 2023-05-19 PROCEDURE — 0DQB0ZZ REPAIR ILEUM, OPEN APPROACH: ICD-10-PCS | Performed by: PEDIATRICS

## 2023-05-19 PROCEDURE — 258N000003 HC RX IP 258 OP 636: Performed by: EMERGENCY MEDICINE

## 2023-05-19 PROCEDURE — 0DTJ0ZZ RESECTION OF APPENDIX, OPEN APPROACH: ICD-10-PCS | Performed by: PEDIATRICS

## 2023-05-19 PROCEDURE — 710N000010 HC RECOVERY PHASE 1, LEVEL 2, PER MIN: Performed by: SPECIALIST

## 2023-05-19 PROCEDURE — 96375 TX/PRO/DX INJ NEW DRUG ADDON: CPT

## 2023-05-19 PROCEDURE — 200N000001 HC R&B ICU

## 2023-05-19 PROCEDURE — 74177 CT ABD & PELVIS W/CONTRAST: CPT

## 2023-05-19 PROCEDURE — 370N000017 HC ANESTHESIA TECHNICAL FEE, PER MIN: Performed by: SPECIALIST

## 2023-05-19 PROCEDURE — 44955 APPENDECTOMY ADD-ON: CPT | Performed by: SPECIALIST

## 2023-05-19 RX ORDER — SODIUM CHLORIDE 9 MG/ML
INJECTION, SOLUTION INTRAVENOUS CONTINUOUS
Status: DISCONTINUED | OUTPATIENT
Start: 2023-05-19 | End: 2023-05-19

## 2023-05-19 RX ORDER — FENTANYL CITRATE 50 UG/ML
50 INJECTION, SOLUTION INTRAMUSCULAR; INTRAVENOUS EVERY 5 MIN PRN
Status: DISCONTINUED | OUTPATIENT
Start: 2023-05-19 | End: 2023-05-19 | Stop reason: HOSPADM

## 2023-05-19 RX ORDER — NALOXONE HYDROCHLORIDE 0.4 MG/ML
0.2 INJECTION, SOLUTION INTRAMUSCULAR; INTRAVENOUS; SUBCUTANEOUS
Status: DISCONTINUED | OUTPATIENT
Start: 2023-05-19 | End: 2023-05-22 | Stop reason: HOSPADM

## 2023-05-19 RX ORDER — ONDANSETRON 2 MG/ML
4 INJECTION INTRAMUSCULAR; INTRAVENOUS EVERY 6 HOURS PRN
Status: DISCONTINUED | OUTPATIENT
Start: 2023-05-19 | End: 2023-05-22 | Stop reason: HOSPADM

## 2023-05-19 RX ORDER — DIMENHYDRINATE 50 MG/ML
25 INJECTION, SOLUTION INTRAMUSCULAR; INTRAVENOUS
Status: DISCONTINUED | OUTPATIENT
Start: 2023-05-19 | End: 2023-05-19 | Stop reason: HOSPADM

## 2023-05-19 RX ORDER — PIPERACILLIN SODIUM, TAZOBACTAM SODIUM 3; .375 G/15ML; G/15ML
3.38 INJECTION, POWDER, LYOPHILIZED, FOR SOLUTION INTRAVENOUS EVERY 6 HOURS
Status: DISCONTINUED | OUTPATIENT
Start: 2023-05-19 | End: 2023-05-22 | Stop reason: HOSPADM

## 2023-05-19 RX ORDER — FENTANYL CITRATE 50 UG/ML
25 INJECTION, SOLUTION INTRAMUSCULAR; INTRAVENOUS EVERY 5 MIN PRN
Status: DISCONTINUED | OUTPATIENT
Start: 2023-05-19 | End: 2023-05-19 | Stop reason: HOSPADM

## 2023-05-19 RX ORDER — NALOXONE HYDROCHLORIDE 0.4 MG/ML
0.4 INJECTION, SOLUTION INTRAMUSCULAR; INTRAVENOUS; SUBCUTANEOUS
Status: DISCONTINUED | OUTPATIENT
Start: 2023-05-19 | End: 2023-05-22 | Stop reason: HOSPADM

## 2023-05-19 RX ORDER — SODIUM CHLORIDE, SODIUM LACTATE, POTASSIUM CHLORIDE, CALCIUM CHLORIDE 600; 310; 30; 20 MG/100ML; MG/100ML; MG/100ML; MG/100ML
INJECTION, SOLUTION INTRAVENOUS CONTINUOUS PRN
Status: DISCONTINUED | OUTPATIENT
Start: 2023-05-19 | End: 2023-05-19

## 2023-05-19 RX ORDER — PIPERACILLIN SODIUM, TAZOBACTAM SODIUM 4; .5 G/20ML; G/20ML
4.5 INJECTION, POWDER, LYOPHILIZED, FOR SOLUTION INTRAVENOUS ONCE
Status: COMPLETED | OUTPATIENT
Start: 2023-05-19 | End: 2023-05-19

## 2023-05-19 RX ORDER — NICOTINE 21 MG/24HR
1 PATCH, TRANSDERMAL 24 HOURS TRANSDERMAL DAILY
Status: DISCONTINUED | OUTPATIENT
Start: 2023-05-19 | End: 2023-05-22 | Stop reason: HOSPADM

## 2023-05-19 RX ORDER — ONDANSETRON 4 MG/1
4 TABLET, ORALLY DISINTEGRATING ORAL EVERY 30 MIN PRN
Status: DISCONTINUED | OUTPATIENT
Start: 2023-05-19 | End: 2023-05-19 | Stop reason: HOSPADM

## 2023-05-19 RX ORDER — HYDROMORPHONE HYDROCHLORIDE 1 MG/ML
0.5 INJECTION, SOLUTION INTRAMUSCULAR; INTRAVENOUS; SUBCUTANEOUS
Status: DISCONTINUED | OUTPATIENT
Start: 2023-05-19 | End: 2023-05-19

## 2023-05-19 RX ORDER — SODIUM CHLORIDE 9 MG/ML
INJECTION, SOLUTION INTRAVENOUS CONTINUOUS
Status: DISCONTINUED | OUTPATIENT
Start: 2023-05-19 | End: 2023-05-19 | Stop reason: HOSPADM

## 2023-05-19 RX ORDER — HYDROXYZINE HYDROCHLORIDE 50 MG/ML
50 INJECTION, SOLUTION INTRAMUSCULAR ONCE
Status: COMPLETED | OUTPATIENT
Start: 2023-05-19 | End: 2023-05-19

## 2023-05-19 RX ORDER — BUPIVACAINE HYDROCHLORIDE AND EPINEPHRINE 2.5; 5 MG/ML; UG/ML
INJECTION, SOLUTION INFILTRATION; PERINEURAL PRN
Status: DISCONTINUED | OUTPATIENT
Start: 2023-05-19 | End: 2023-05-19 | Stop reason: HOSPADM

## 2023-05-19 RX ORDER — HYDROMORPHONE HYDROCHLORIDE 1 MG/ML
0.3 INJECTION, SOLUTION INTRAMUSCULAR; INTRAVENOUS; SUBCUTANEOUS
Status: DISCONTINUED | OUTPATIENT
Start: 2023-05-19 | End: 2023-05-22 | Stop reason: HOSPADM

## 2023-05-19 RX ORDER — CEFAZOLIN SODIUM/WATER 2 G/20 ML
2 SYRINGE (ML) INTRAVENOUS
Status: COMPLETED | OUTPATIENT
Start: 2023-05-19 | End: 2023-05-19

## 2023-05-19 RX ORDER — MEPERIDINE HYDROCHLORIDE 25 MG/ML
12.5 INJECTION INTRAMUSCULAR; INTRAVENOUS; SUBCUTANEOUS EVERY 5 MIN PRN
Status: DISCONTINUED | OUTPATIENT
Start: 2023-05-19 | End: 2023-05-19 | Stop reason: HOSPADM

## 2023-05-19 RX ORDER — DIMENHYDRINATE 50 MG/ML
25 INJECTION, SOLUTION INTRAMUSCULAR; INTRAVENOUS
Status: COMPLETED | OUTPATIENT
Start: 2023-05-19 | End: 2023-05-19

## 2023-05-19 RX ORDER — AMOXICILLIN 250 MG
1 CAPSULE ORAL 2 TIMES DAILY
Status: DISCONTINUED | OUTPATIENT
Start: 2023-05-19 | End: 2023-05-22 | Stop reason: HOSPADM

## 2023-05-19 RX ORDER — COVID-19 ANTIGEN TEST
KIT MISCELLANEOUS
Status: ON HOLD | COMMUNITY
Start: 2023-03-09 | End: 2023-05-20

## 2023-05-19 RX ORDER — HYDROMORPHONE HCL IN WATER/PF 6 MG/30 ML
0.2 PATIENT CONTROLLED ANALGESIA SYRINGE INTRAVENOUS
Status: DISCONTINUED | OUTPATIENT
Start: 2023-05-19 | End: 2023-05-22 | Stop reason: HOSPADM

## 2023-05-19 RX ORDER — ONDANSETRON 2 MG/ML
4 INJECTION INTRAMUSCULAR; INTRAVENOUS EVERY 30 MIN PRN
Status: DISCONTINUED | OUTPATIENT
Start: 2023-05-19 | End: 2023-05-19 | Stop reason: HOSPADM

## 2023-05-19 RX ORDER — BISACODYL 10 MG
10 SUPPOSITORY, RECTAL RECTAL DAILY PRN
Status: DISCONTINUED | OUTPATIENT
Start: 2023-05-19 | End: 2023-05-22 | Stop reason: HOSPADM

## 2023-05-19 RX ORDER — FENTANYL CITRATE 50 UG/ML
INJECTION, SOLUTION INTRAMUSCULAR; INTRAVENOUS PRN
Status: DISCONTINUED | OUTPATIENT
Start: 2023-05-19 | End: 2023-05-19

## 2023-05-19 RX ORDER — DEXTROSE MONOHYDRATE 100 MG/ML
INJECTION, SOLUTION INTRAVENOUS CONTINUOUS PRN
Status: DISCONTINUED | OUTPATIENT
Start: 2023-05-19 | End: 2023-05-22 | Stop reason: HOSPADM

## 2023-05-19 RX ORDER — ACETAMINOPHEN 325 MG/1
650 TABLET ORAL EVERY 4 HOURS PRN
Status: DISCONTINUED | OUTPATIENT
Start: 2023-05-22 | End: 2023-05-22 | Stop reason: HOSPADM

## 2023-05-19 RX ORDER — ACETAMINOPHEN 500 MG
500 TABLET ORAL EVERY 4 HOURS PRN
COMMUNITY
Start: 2022-09-06

## 2023-05-19 RX ORDER — URINE ACETONE TEST STRIPS
STRIP MISCELLANEOUS
COMMUNITY
Start: 2022-09-19

## 2023-05-19 RX ORDER — SODIUM CHLORIDE, SODIUM LACTATE, POTASSIUM CHLORIDE, CALCIUM CHLORIDE 600; 310; 30; 20 MG/100ML; MG/100ML; MG/100ML; MG/100ML
INJECTION, SOLUTION INTRAVENOUS CONTINUOUS
Status: DISCONTINUED | OUTPATIENT
Start: 2023-05-19 | End: 2023-05-19 | Stop reason: HOSPADM

## 2023-05-19 RX ORDER — KETOROLAC TROMETHAMINE 30 MG/ML
30 INJECTION, SOLUTION INTRAMUSCULAR; INTRAVENOUS ONCE
Status: COMPLETED | OUTPATIENT
Start: 2023-05-19 | End: 2023-05-19

## 2023-05-19 RX ORDER — DEXTROSE MONOHYDRATE, SODIUM CHLORIDE, AND POTASSIUM CHLORIDE 50; 1.49; 4.5 G/1000ML; G/1000ML; G/1000ML
INJECTION, SOLUTION INTRAVENOUS CONTINUOUS
Status: DISCONTINUED | OUTPATIENT
Start: 2023-05-19 | End: 2023-05-22

## 2023-05-19 RX ORDER — HYDROMORPHONE HYDROCHLORIDE 1 MG/ML
0.5 INJECTION, SOLUTION INTRAMUSCULAR; INTRAVENOUS; SUBCUTANEOUS EVERY 5 MIN PRN
Status: DISCONTINUED | OUTPATIENT
Start: 2023-05-19 | End: 2023-05-19 | Stop reason: HOSPADM

## 2023-05-19 RX ORDER — CEFAZOLIN SODIUM/WATER 2 G/20 ML
2 SYRINGE (ML) INTRAVENOUS SEE ADMIN INSTRUCTIONS
Status: DISCONTINUED | OUTPATIENT
Start: 2023-05-19 | End: 2023-05-19 | Stop reason: HOSPADM

## 2023-05-19 RX ORDER — POLYETHYLENE GLYCOL 3350 17 G/17G
17 POWDER, FOR SOLUTION ORAL DAILY
Status: DISCONTINUED | OUTPATIENT
Start: 2023-05-20 | End: 2023-05-22 | Stop reason: HOSPADM

## 2023-05-19 RX ORDER — IOPAMIDOL 755 MG/ML
500 INJECTION, SOLUTION INTRAVASCULAR ONCE
Status: COMPLETED | OUTPATIENT
Start: 2023-05-19 | End: 2023-05-19

## 2023-05-19 RX ORDER — ASPIRIN 325 MG
325 TABLET, DELAYED RELEASE (ENTERIC COATED) ORAL 2 TIMES DAILY
Status: ON HOLD | COMMUNITY
End: 2023-05-22

## 2023-05-19 RX ORDER — NICOTINE POLACRILEX 4 MG
15-30 LOZENGE BUCCAL
Status: DISCONTINUED | OUTPATIENT
Start: 2023-05-19 | End: 2023-05-22 | Stop reason: HOSPADM

## 2023-05-19 RX ORDER — ONDANSETRON 2 MG/ML
INJECTION INTRAMUSCULAR; INTRAVENOUS PRN
Status: DISCONTINUED | OUTPATIENT
Start: 2023-05-19 | End: 2023-05-19

## 2023-05-19 RX ORDER — BUPIVACAINE HYDROCHLORIDE 2.5 MG/ML
INJECTION, SOLUTION EPIDURAL; INFILTRATION; INTRACAUDAL PRN
Status: DISCONTINUED | OUTPATIENT
Start: 2023-05-19 | End: 2023-05-19

## 2023-05-19 RX ORDER — DEXTROSE MONOHYDRATE 25 G/50ML
25-50 INJECTION, SOLUTION INTRAVENOUS
Status: DISCONTINUED | OUTPATIENT
Start: 2023-05-19 | End: 2023-05-22 | Stop reason: HOSPADM

## 2023-05-19 RX ORDER — QUETIAPINE FUMARATE 25 MG/1
25 TABLET, FILM COATED ORAL AT BEDTIME
COMMUNITY
Start: 2023-04-28 | End: 2024-06-05

## 2023-05-19 RX ORDER — MEPERIDINE HYDROCHLORIDE 25 MG/ML
50 INJECTION INTRAMUSCULAR; INTRAVENOUS; SUBCUTANEOUS ONCE
Status: COMPLETED | OUTPATIENT
Start: 2023-05-19 | End: 2023-05-19

## 2023-05-19 RX ORDER — ALBUTEROL SULFATE 0.83 MG/ML
2.5 SOLUTION RESPIRATORY (INHALATION) EVERY 4 HOURS PRN
Status: DISCONTINUED | OUTPATIENT
Start: 2023-05-19 | End: 2023-05-19 | Stop reason: HOSPADM

## 2023-05-19 RX ORDER — ONDANSETRON 4 MG/1
4 TABLET, ORALLY DISINTEGRATING ORAL
Status: ON HOLD | COMMUNITY
Start: 2023-01-18 | End: 2023-05-20

## 2023-05-19 RX ORDER — PROCHLORPERAZINE MALEATE 5 MG
10 TABLET ORAL EVERY 6 HOURS PRN
Status: DISCONTINUED | OUTPATIENT
Start: 2023-05-19 | End: 2023-05-22 | Stop reason: HOSPADM

## 2023-05-19 RX ORDER — INSULIN GLARGINE-YFGN 100 [IU]/ML
INJECTION, SOLUTION SUBCUTANEOUS
COMMUNITY
Start: 2022-09-13

## 2023-05-19 RX ORDER — HYDRALAZINE HYDROCHLORIDE 20 MG/ML
2.5-5 INJECTION INTRAMUSCULAR; INTRAVENOUS EVERY 10 MIN PRN
Status: DISCONTINUED | OUTPATIENT
Start: 2023-05-19 | End: 2023-05-19 | Stop reason: HOSPADM

## 2023-05-19 RX ORDER — CETIRIZINE HYDROCHLORIDE 10 MG/1
10 TABLET ORAL
Status: ON HOLD | COMMUNITY
End: 2023-05-20

## 2023-05-19 RX ORDER — PROPOFOL 10 MG/ML
INJECTION, EMULSION INTRAVENOUS PRN
Status: DISCONTINUED | OUTPATIENT
Start: 2023-05-19 | End: 2023-05-19

## 2023-05-19 RX ORDER — HYDROXYZINE HYDROCHLORIDE 25 MG/1
25 TABLET, FILM COATED ORAL EVERY 6 HOURS PRN
Status: DISCONTINUED | OUTPATIENT
Start: 2023-05-19 | End: 2023-05-19 | Stop reason: HOSPADM

## 2023-05-19 RX ORDER — LIDOCAINE 40 MG/G
CREAM TOPICAL
Status: DISCONTINUED | OUTPATIENT
Start: 2023-05-19 | End: 2023-05-22 | Stop reason: HOSPADM

## 2023-05-19 RX ORDER — ESTRADIOL 0.05 MG/D
PATCH TRANSDERMAL
Status: ON HOLD | COMMUNITY
Start: 2023-03-20 | End: 2023-05-20

## 2023-05-19 RX ORDER — HYDROXYZINE HYDROCHLORIDE 25 MG/1
25 TABLET, FILM COATED ORAL EVERY 6 HOURS PRN
Status: DISCONTINUED | OUTPATIENT
Start: 2023-05-19 | End: 2023-05-22 | Stop reason: HOSPADM

## 2023-05-19 RX ORDER — HYDROMORPHONE HCL IN WATER/PF 6 MG/30 ML
0.2 PATIENT CONTROLLED ANALGESIA SYRINGE INTRAVENOUS
Status: DISCONTINUED | OUTPATIENT
Start: 2023-05-19 | End: 2023-05-19

## 2023-05-19 RX ORDER — ONDANSETRON 4 MG/1
4 TABLET, ORALLY DISINTEGRATING ORAL EVERY 6 HOURS PRN
Status: DISCONTINUED | OUTPATIENT
Start: 2023-05-19 | End: 2023-05-22 | Stop reason: HOSPADM

## 2023-05-19 RX ORDER — HYDROMORPHONE HYDROCHLORIDE 1 MG/ML
0.25 INJECTION, SOLUTION INTRAMUSCULAR; INTRAVENOUS; SUBCUTANEOUS EVERY 5 MIN PRN
Status: DISCONTINUED | OUTPATIENT
Start: 2023-05-19 | End: 2023-05-19 | Stop reason: HOSPADM

## 2023-05-19 RX ORDER — AMPICILLIN AND SULBACTAM 2; 1 G/1; G/1
3 INJECTION, POWDER, FOR SOLUTION INTRAMUSCULAR; INTRAVENOUS ONCE
Status: DISCONTINUED | OUTPATIENT
Start: 2023-05-19 | End: 2023-05-19

## 2023-05-19 RX ORDER — ENOXAPARIN SODIUM 100 MG/ML
40 INJECTION SUBCUTANEOUS EVERY 24 HOURS
Status: DISCONTINUED | OUTPATIENT
Start: 2023-05-20 | End: 2023-05-22 | Stop reason: HOSPADM

## 2023-05-19 RX ORDER — LIDOCAINE HYDROCHLORIDE 20 MG/ML
INJECTION, SOLUTION INFILTRATION; PERINEURAL PRN
Status: DISCONTINUED | OUTPATIENT
Start: 2023-05-19 | End: 2023-05-19

## 2023-05-19 RX ORDER — ONDANSETRON 2 MG/ML
4 INJECTION INTRAMUSCULAR; INTRAVENOUS EVERY 30 MIN PRN
Status: DISCONTINUED | OUTPATIENT
Start: 2023-05-19 | End: 2023-05-19

## 2023-05-19 RX ORDER — METOPROLOL TARTRATE 1 MG/ML
1-2 INJECTION, SOLUTION INTRAVENOUS EVERY 5 MIN PRN
Status: DISCONTINUED | OUTPATIENT
Start: 2023-05-19 | End: 2023-05-19 | Stop reason: HOSPADM

## 2023-05-19 RX ORDER — ACETAMINOPHEN 325 MG/1
975 TABLET ORAL EVERY 8 HOURS
Status: COMPLETED | OUTPATIENT
Start: 2023-05-19 | End: 2023-05-22

## 2023-05-19 RX ORDER — LIDOCAINE 40 MG/G
CREAM TOPICAL
Status: DISCONTINUED | OUTPATIENT
Start: 2023-05-19 | End: 2023-05-19 | Stop reason: HOSPADM

## 2023-05-19 RX ADMIN — ONDANSETRON 4 MG: 2 INJECTION INTRAMUSCULAR; INTRAVENOUS at 13:46

## 2023-05-19 RX ADMIN — HYDROMORPHONE HYDROCHLORIDE 0.5 MG: 1 INJECTION, SOLUTION INTRAMUSCULAR; INTRAVENOUS; SUBCUTANEOUS at 15:07

## 2023-05-19 RX ADMIN — DIMENHYDRINATE 25 MG: 50 INJECTION, SOLUTION INTRAMUSCULAR; INTRAVENOUS at 12:28

## 2023-05-19 RX ADMIN — BUPIVACAINE 20 ML: 13.3 INJECTION, SUSPENSION, LIPOSOMAL INFILTRATION at 14:14

## 2023-05-19 RX ADMIN — SUGAMMADEX 200 MG: 100 INJECTION, SOLUTION INTRAVENOUS at 14:03

## 2023-05-19 RX ADMIN — SODIUM CHLORIDE 1000 ML: 9 INJECTION, SOLUTION INTRAVENOUS at 09:55

## 2023-05-19 RX ADMIN — HYDROXYZINE HYDROCHLORIDE 50 MG: 50 INJECTION, SOLUTION INTRAMUSCULAR at 14:59

## 2023-05-19 RX ADMIN — FENTANYL CITRATE 100 MCG: 50 INJECTION, SOLUTION INTRAMUSCULAR; INTRAVENOUS at 11:56

## 2023-05-19 RX ADMIN — PHENYLEPHRINE HYDROCHLORIDE 100 MCG: 10 INJECTION INTRAVENOUS at 12:35

## 2023-05-19 RX ADMIN — BUPIVACAINE HYDROCHLORIDE 30 ML: 2.5 INJECTION, SOLUTION EPIDURAL; INFILTRATION; INTRACAUDAL; PERINEURAL at 14:14

## 2023-05-19 RX ADMIN — KETOROLAC TROMETHAMINE 30 MG: 30 INJECTION, SOLUTION INTRAMUSCULAR at 07:27

## 2023-05-19 RX ADMIN — PHENYLEPHRINE HYDROCHLORIDE 100 MCG: 10 INJECTION INTRAVENOUS at 12:37

## 2023-05-19 RX ADMIN — ACETAMINOPHEN 975 MG: 325 TABLET ORAL at 21:01

## 2023-05-19 RX ADMIN — MIDAZOLAM 2 MG: 1 INJECTION INTRAMUSCULAR; INTRAVENOUS at 14:52

## 2023-05-19 RX ADMIN — PANTOPRAZOLE SODIUM 40 MG: 40 INJECTION, POWDER, FOR SOLUTION INTRAVENOUS at 21:00

## 2023-05-19 RX ADMIN — SODIUM CHLORIDE 1 UNITS/HR: 9 INJECTION, SOLUTION INTRAVENOUS at 17:15

## 2023-05-19 RX ADMIN — ROCURONIUM BROMIDE 40 MG: 50 INJECTION, SOLUTION INTRAVENOUS at 12:50

## 2023-05-19 RX ADMIN — SODIUM CHLORIDE: 9 INJECTION, SOLUTION INTRAVENOUS at 17:09

## 2023-05-19 RX ADMIN — SODIUM CHLORIDE: 9 INJECTION, SOLUTION INTRAVENOUS at 11:43

## 2023-05-19 RX ADMIN — IOPAMIDOL 150 ML: 755 INJECTION, SOLUTION INTRAVENOUS at 08:52

## 2023-05-19 RX ADMIN — HYDROMORPHONE HYDROCHLORIDE 0.5 MG: 1 INJECTION, SOLUTION INTRAMUSCULAR; INTRAVENOUS; SUBCUTANEOUS at 09:57

## 2023-05-19 RX ADMIN — Medication 1 PATCH: at 18:23

## 2023-05-19 RX ADMIN — FENTANYL CITRATE 50 MCG: 50 INJECTION, SOLUTION INTRAMUSCULAR; INTRAVENOUS at 14:44

## 2023-05-19 RX ADMIN — FENTANYL CITRATE 50 MCG: 50 INJECTION, SOLUTION INTRAMUSCULAR; INTRAVENOUS at 14:31

## 2023-05-19 RX ADMIN — SODIUM CHLORIDE 130 ML: 9 INJECTION, SOLUTION INTRAVENOUS at 08:52

## 2023-05-19 RX ADMIN — ONDANSETRON 4 MG: 2 INJECTION INTRAMUSCULAR; INTRAVENOUS at 09:55

## 2023-05-19 RX ADMIN — DEXMEDETOMIDINE HYDROCHLORIDE 10 MCG: 100 INJECTION, SOLUTION INTRAVENOUS at 14:22

## 2023-05-19 RX ADMIN — PIPERACILLIN AND TAZOBACTAM 3.38 G: 3; .375 INJECTION, POWDER, FOR SOLUTION INTRAVENOUS at 23:06

## 2023-05-19 RX ADMIN — HYDROMORPHONE HYDROCHLORIDE 0.5 MG: 1 INJECTION, SOLUTION INTRAMUSCULAR; INTRAVENOUS; SUBCUTANEOUS at 15:44

## 2023-05-19 RX ADMIN — SODIUM CHLORIDE: 9 INJECTION, SOLUTION INTRAVENOUS at 10:32

## 2023-05-19 RX ADMIN — POTASSIUM CHLORIDE, DEXTROSE MONOHYDRATE AND SODIUM CHLORIDE: 150; 5; 450 INJECTION, SOLUTION INTRAVENOUS at 19:00

## 2023-05-19 RX ADMIN — PROPOFOL 250 MG: 10 INJECTION, EMULSION INTRAVENOUS at 12:07

## 2023-05-19 RX ADMIN — MEPERIDINE HYDROCHLORIDE 50 MG: 25 INJECTION INTRAMUSCULAR; INTRAVENOUS; SUBCUTANEOUS at 14:59

## 2023-05-19 RX ADMIN — Medication 2 G: at 11:56

## 2023-05-19 RX ADMIN — DEXMEDETOMIDINE HYDROCHLORIDE 20 MCG: 100 INJECTION, SOLUTION INTRAVENOUS at 12:00

## 2023-05-19 RX ADMIN — LIDOCAINE HYDROCHLORIDE 50 MG: 20 INJECTION, SOLUTION INFILTRATION; PERINEURAL at 12:06

## 2023-05-19 RX ADMIN — SODIUM CHLORIDE, POTASSIUM CHLORIDE, SODIUM LACTATE AND CALCIUM CHLORIDE: 600; 310; 30; 20 INJECTION, SOLUTION INTRAVENOUS at 15:39

## 2023-05-19 RX ADMIN — ROCURONIUM BROMIDE 50 MG: 50 INJECTION, SOLUTION INTRAVENOUS at 12:07

## 2023-05-19 RX ADMIN — HYDROMORPHONE HYDROCHLORIDE 0.5 MG: 1 INJECTION, SOLUTION INTRAMUSCULAR; INTRAVENOUS; SUBCUTANEOUS at 16:58

## 2023-05-19 RX ADMIN — SODIUM CHLORIDE, POTASSIUM CHLORIDE, SODIUM LACTATE AND CALCIUM CHLORIDE: 600; 310; 30; 20 INJECTION, SOLUTION INTRAVENOUS at 13:16

## 2023-05-19 RX ADMIN — HYDROMORPHONE HYDROCHLORIDE 0.2 MG: 0.2 INJECTION, SOLUTION INTRAMUSCULAR; INTRAVENOUS; SUBCUTANEOUS at 18:58

## 2023-05-19 RX ADMIN — PIPERACILLIN AND TAZOBACTAM 3.38 G: 3; .375 INJECTION, POWDER, FOR SOLUTION INTRAVENOUS at 18:19

## 2023-05-19 RX ADMIN — SENNOSIDES AND DOCUSATE SODIUM 1 TABLET: 8.6; 5 TABLET ORAL at 21:01

## 2023-05-19 RX ADMIN — HYDROMORPHONE HYDROCHLORIDE 0.5 MG: 1 INJECTION, SOLUTION INTRAMUSCULAR; INTRAVENOUS; SUBCUTANEOUS at 14:19

## 2023-05-19 RX ADMIN — PIPERACILLIN AND TAZOBACTAM 4.5 G: 4; .5 INJECTION, POWDER, FOR SOLUTION INTRAVENOUS at 10:08

## 2023-05-19 RX ADMIN — HYDROMORPHONE HYDROCHLORIDE 0.2 MG: 0.2 INJECTION, SOLUTION INTRAMUSCULAR; INTRAVENOUS; SUBCUTANEOUS at 21:01

## 2023-05-19 ASSESSMENT — ACTIVITIES OF DAILY LIVING (ADL)
ADLS_ACUITY_SCORE: 20
FALL_HISTORY_WITHIN_LAST_SIX_MONTHS: NO
ADLS_ACUITY_SCORE: 35
ADLS_ACUITY_SCORE: 35
DIFFICULTY_EATING/SWALLOWING: NO
WALKING_OR_CLIMBING_STAIRS_DIFFICULTY: NO
ADLS_ACUITY_SCORE: 35
ADLS_ACUITY_SCORE: 35
DRESSING/BATHING_DIFFICULTY: NO
CONCENTRATING,_REMEMBERING_OR_MAKING_DECISIONS_DIFFICULTY: NO
ADLS_ACUITY_SCORE: 20
TOILETING_ISSUES: NO
WEAR_GLASSES_OR_BLIND: NO
CHANGE_IN_FUNCTIONAL_STATUS_SINCE_ONSET_OF_CURRENT_ILLNESS/INJURY: NO
ADLS_ACUITY_SCORE: 35
ADLS_ACUITY_SCORE: 35
DOING_ERRANDS_INDEPENDENTLY_DIFFICULTY: NO
ADLS_ACUITY_SCORE: 35

## 2023-05-19 ASSESSMENT — LIFESTYLE VARIABLES: TOBACCO_USE: 1

## 2023-05-19 NOTE — PROGRESS NOTES
"S-(situation): Patient arrives to room 217 via cart from PACU at about 1615    B-(background): Appendectomy, probable Microperforation of ileum    A-(assessment): NG with approximately 75 ml returns in cannister, hooked up to low intermittent suction upon arrival to ICU. Moved from cart to bed with slide board and 4 staff. Patient painful, yelling out in pain and belligerant at times. States her ADHD \"sensory overload\" is too much and she cannot tolerate the suction sound of the NG tube.  Offered her ear plugs x 2. Is a smoker, obtained order for Nicotine patch. Blood sugars taken in coordination with labs. Initiated insulin drip at 1 ml/ hour. LR was infusing open to gravity. Changed IV fluids to NS 0.9% at 100 ml/ hour per order. Now changing IV fluids again to D5 0.45% plus 20 mEq potassium as patient has some metabolic acidosis and is in DKA according to provider.     R-(recommendations): Orders reviewed with Heidi. Will monitor patient per MD orders.     Inpatient nursing criteria listed below were met:    Health care directives status obtained and documented: none  VTE ordered/documented: Yes  Skin issues/needs documented: Incision site and trocar site, some drainage noted, outlined with black marker.  Isolation addressed and Signage used: NA  Fall Prevention: Care plan updated Yes Education given and documented Yes  Care Plan initiated and Co-Morbidities added: Yes  Education Assessment documented:Yes  Admission Education Documented: Yes  If present CAUTI/CLABI Education done: NA  New medication patient education completed and documented (Possible Side Effects of Common Medications handout): Yes  Allergies Reviewed: Yes  Admission Medication Reconciliation completed: Yes  Home medications if not able to send immediately home with family stored here: NA  Reminder note placed in discharge instructions regarding home meds: NA  Individualized care needs/preferences addressed and charted: Yes  Provider Notified " that patient has arrived to the unit: Yes

## 2023-05-19 NOTE — ANESTHESIA PREPROCEDURE EVALUATION
Anesthesia Pre-Procedure Evaluation    Patient: Heidi Su   MRN: [id number] : 1985        Procedure : Procedure(s):  LAPAROSCOPY, DIAGNOSTIC, BY GENERAL SURGERY  LAPAROTOMY          Past Medical History:   Diagnosis Date     Anxiety      Bronchitis      Depressive disorder      Diabetes (H)      NO ACTIVE PROBLEMS      Ovarian cyst       Past Surgical History:   Procedure Laterality Date     CYSTECTOMY OVARIAN BENIGN       DILATION AND CURETTAGE       DILATION AND CURETTAGE       GYN SURGERY       HEMORRHOID SURGERY        Allergies   Allergen Reactions     No Known Allergies       Social History     Tobacco Use     Smoking status: Every Day     Packs/day: 0.25     Types: Cigarettes     Smokeless tobacco: Never   Vaping Use     Vaping status: Not on file   Substance Use Topics     Alcohol use: Yes     Comment: 1-2x/month      Wt Readings from Last 1 Encounters:   23 72.6 kg (160 lb)        Anesthesia Evaluation   Pt has had prior anesthetic. Type: MAC and General.    No history of anesthetic complications       ROS/MED HX  ENT/Pulmonary:     (+) tobacco use, Current use,     Neurologic:  - neg neurologic ROS     Cardiovascular:  - neg cardiovascular ROS   (+) -----No previous cardiac testing     METS/Exercise Tolerance:     Hematologic:  - neg hematologic  ROS     Musculoskeletal:  - neg musculoskeletal ROS     GI/Hepatic: Comment: Free air in abdomen     (+) GERD, Asymptomatic on medication,     Renal/Genitourinary:  - neg Renal ROS     Endo: Comment: Admitted last year for DKA 2022    (+) type I DM, Last HgA1c: 10.5, date: 22, Using insulin, - not using insulin pump. Normal glucose range: BS today 300,11 am , Previously admitted for DM/DKA.     Psychiatric/Substance Use:     (+) psychiatric history depression and bipolar alcohol abuse     Infectious Disease:     (+) Recent Fever,     Malignancy:  - neg malignancy ROS     Other:            Physical Exam    Airway  airway exam  normal      Mallampati: II   TM distance: > 3 FB   Neck ROM: full   Mouth opening: > 3 cm    Respiratory Devices and Support         Dental     Comment: Pt has upper dentures, lower teeth all intact    (+) Removable bridges or other hardware      Cardiovascular   cardiovascular exam normal       Rhythm and rate: regular and normal     Pulmonary   pulmonary exam normal        breath sounds clear to auscultation           OUTSIDE LABS:  CBC:   Lab Results   Component Value Date    WBC 15.1 (H) 05/19/2023    WBC 8.5 10/12/2020    HGB 14.1 05/19/2023    HGB 14.4 10/12/2020    HCT 42.4 05/19/2023    HCT 42.3 10/12/2020     05/19/2023     10/12/2020     BMP:   Lab Results   Component Value Date     (L) 05/19/2023     (L) 11/29/2022    POTASSIUM 3.4 05/19/2023    POTASSIUM 4.1 11/29/2022    CHLORIDE 100 05/19/2023    CHLORIDE 101 11/29/2022    CO2 19 (L) 05/19/2023    CO2 20 11/29/2022    BUN 5.4 (L) 05/19/2023    BUN 16 11/29/2022    CR 0.55 05/19/2023    CR 0.67 11/29/2022     (H) 05/19/2023     (HH) 11/29/2022     COAGS: No results found for: PTT, INR, FIBR  POC:   Lab Results   Component Value Date     (H) 04/29/2019    HCG Negative 10/12/2020     HEPATIC:   Lab Results   Component Value Date    ALBUMIN 3.7 11/29/2022    PROTTOTAL 7.5 11/29/2022    ALT 15 11/29/2022    AST 10 11/29/2022    ALKPHOS 73 11/29/2022    BILITOTAL 0.3 11/29/2022     OTHER:   Lab Results   Component Value Date    LACT 2.8 (H) 04/29/2019    NADIR 9.5 05/19/2023    CRP <2.9 02/21/2017       Anesthesia Plan    ASA Status:  2, emergent    NPO Status:  NPO Appropriate    Anesthesia Type: General.     - Airway: ETT   Induction: Intravenous, Propofol.   Maintenance: Balanced.        Consents    Anesthesia Plan(s) and associated risks, benefits, and realistic alternatives discussed. Questions answered and patient/representative(s) expressed understanding.    - Discussed:     - Discussed with:  Patient     Use of blood products discussed: No .     Postoperative Care    Pain management: IV analgesics, Oral pain medications, Peripheral nerve block (Single Shot).   PONV prophylaxis: Ondansetron (or other 5HT-3), Dexamethasone or Solumedrol     Comments:    Other Comments: The risks and benefits of General and TAP block anesthesia, and the alternatives where applicable, have been discussed with the patient, and they wish to proceed.            EDGARDO Byrd CRNA

## 2023-05-19 NOTE — BRIEF OP NOTE
[facility]    Brief Operative Note    Pre-operative diagnosis: Free intraperitoneal air [K66.8]  Post-operative diagnosis same, possible mircoperforation of ileum, appendicoliths    Procedure: Procedure(s):  LAPAROSCOPY, DIAGNOSTIC, converted to laparotomy, appendectomy  LAPAROTOMY  Surgeon: Surgeon(s) and Role:     * Jesse Blackman MD - Primary     * Chintan Enrique,  - Assisting who was needed for exposure   Anesthesia: General with Block   Estimated Blood Loss: Less than 10 ml    Drains: None  Specimens:   ID Type Source Tests Collected by Time Destination   1 : appendix Tissue Appendix SURGICAL PATHOLOGY EXAM Jesse Blackman MD 5/19/2023  1:17 PM        Findings:   distal ileum with exuudate and serosal tear.  No obvious perofration.  NL stomach and Duodenum, Appendix with appendicoliths.  Complications: None.  Implants: * No implants in log *      Jesse Blackman MD, FACS      #[id number]

## 2023-05-19 NOTE — OP NOTE
Procedure Date: 05/19/2023    PREOPERATIVE DIAGNOSIS:  Free intraperitoneal air, with early sepsis.    POSTOPERATIVE DIAGNOSIS:  Free intraperitoneal air, with early sepsis, possible microperforation of the ileum and appendicoliths.    PROCEDURES PERFORMED:   1.  Diagnostic laparoscopy.  2.  Exploratory laparotomy.  3.  Appendectomy.    SURGEON:  Jesse Blackman MD, FACS    ASSISTANT:  Vera, who was needed for exposure    ANESTHESIA:  General with a TAP block.    INDICATIONS FOR PROCEDURE:  This is a 37-year-old lady who presented with severe onset of abdominal pain about 6:00 this morning.  She presented to the ER, at which point she was tachycardic, with a white count of 15,000.  A CT of her chest was done to rule out a PE, and she was found to have a significant amount of intraperitoneal air.  Subsequent abdominal CT also showed a large amount of free air, but no obvious source.  No fluid.  So for this reason, we elected to take her to the operating room for a diagnostic laparoscopy and possible exploratory laparotomy.    OPERATIVE FINDINGS:  Include a normal stomach and duodenum.  The distal ileum had exudate with a serosal tear, but no obvious area of perforation.  The appendix was normal appearing, but full of appendicoliths.  The entire colon on palpation was normal, without evidence of any inflammation or perforation.    DETAILS OF PROCEDURE:  The patient was taken to the operating room and placed on the table in supine position.  After induction of anesthesia, the abdomen was prepped and draped in sterile fashion.  A timeout was performed confirming the day, the patient, as well as the procedure to be performed.  A supraumbilical incision was made.  A 5 mm Visiport was inserted in the abdomen, and the abdomen was insufflated to 15 mmHg pressure.  Generalized inspection of the abdomen was carried out.  No fluid was noted.  We then placed a left upper quadrant 5 mm trocar.  We initially inspected the  stomach, and there was no evidence of any inflammation or thickening.  We were also able to get a look at the duodenum, which also appeared normal, without fluid, perforation or any inflammation.  As there was no obvious source for the perforation, it was elected to convert to an open procedure.  An upper midline incision was made.  Subcutaneous tissue was opened and the fascia was opened using cautery.  Upon entering the abdomen, we initially inspected the stomach and palpated it.  It was completely normal, without evidence of any inflammation.  The lesser sac was then opened with a LigaSure device.  We inspected the posterior stomach.  Again, no evidence of any perforation or ulcer.  We then palpated the entire duodenum and we visualized parts of it, and again, no evidence of inflammation or perforation.  We then ran the entire small bowel from the ligament of Treitz distally, until we encountered an area of exudate in the distal ileum, There appeared to be a serosal tear and a possible microperforation.  This area was then oversewn with 3-0 Vicryl.  We then ran all of the way to the terminal ileum and upon palpation, the appendix was found to be full of appendicoliths.  So, at this point, it was elected to take the appendix to prevent a possible future attack of appendicitis.  The appendiceal mesentery was taken down using a LigaSure device.  The base of the appendix was tied off using an 0 silk tie.  This was then transected using EndoShears.  The mucosa was cauterized, and a 3-0 Vicryl was then used to create a Z-stitch, to dunk the appendiceal stump.  I then palpated the entire ascending, transverse and sigmoid colon.  There was no evidence of any inflammation, perforation or other issues.  The abdomen was then copiously irrigated.  We then put fluid in the lesser sac and upper abdomen, and then the duodenum was occluded, and insufflation was then passed through the NG tube, which was placed previously. There  was no evidence of any air leak from the stomach or duodenum.  Feeling that this was not the source, and it is probably a microperforation, all fluid was suctioned out.  The fascia was closed using a running #1 PDS.  All skin incisions were closed using staples.  A TAP block was placed by anesthesia, and the patient was taken from the operating room to recovery in stable condition to be admitted to the floor.    Jesse Blackman MD, FACS        D: 2023   T: 2023   MT: lily    Name:     ANA LAURA BENDERBess  MRN:      [id number]        Account:        [id number]   :      1985           Procedure Date: 2023     Document: [id number]   Speaking Coherently

## 2023-05-19 NOTE — ANESTHESIA PROCEDURE NOTES
Airway       Patient location during procedure: OR       Procedure Start/Stop Times: 5/19/2023 12:09 PM  Staff -        CRNA: Phil Roland APRN CRNA       Performed By: CRNA  Consent for Airway        Urgency: elective  Indications and Patient Condition       Indications for airway management: adelfo-procedural       Induction type:RSI       Mask difficulty assessment: 0 - not attempted    Final Airway Details       Final airway type: endotracheal airway       Successful airway: ETT - single  Endotracheal Airway Details        ETT size (mm): 6.5       Cuffed: yes       Successful intubation technique: video laryngoscopy       VL Blade Size: Limon 3       Grade View of Cords: 1       Adjucts: stylet       Position: Right       Measured from: lips       Secured at (cm): 21       Bite block used: Oral Airway    Post intubation assessment        Placement verified by: capnometry, equal breath sounds and chest rise        Number of attempts at approach: 1       Number of other approaches attempted: 0       Secured with: plastic tape       Ease of procedure: easy       Dentition: Intact and Unchanged    Medication(s) Administered   Medication Administration Time: 5/19/2023 12:09 PM

## 2023-05-19 NOTE — ED TRIAGE NOTES
PT reports 45 minutes ago she was laying in bed and developed chest pain and SOB.      Triage Assessment     Row Name 05/19/23 0602       Triage Assessment (Adult)    Airway WDL WDL       Respiratory WDL    Respiratory WDL rhythm/pattern    Rhythm/Pattern, Respiratory gasping       Skin Circulation/Temperature WDL    Skin Circulation/Temperature WDL WDL       Cardiac WDL    Cardiac WDL chest pain       Chest Pain Assessment    Chest Pain Location midsternal

## 2023-05-19 NOTE — ED PROVIDER NOTES
"  History     Chief Complaint   Patient presents with     Chest Pain     Shortness of Breath     HPI  Heidi Su is a 37 year old female who has active health problems of:  Problem List:   Patient Active Problem List   Diagnosis Date Noted     Lumbar radiculopathy 2022     S/P laparoscopic assisted vaginal hysterectomy (LAVH) 2022     Surgical menopause on hormone replacement therapy 2022     Menorrhagia with regular cycle 10/06/2022     Gingivitis 2022     DKA, type 1 (HC) 2022     HA (headache)/ right facial pain 2022     Nexplanon in place 2022     Uncontrolled type 1 diabetes mellitus a1c=10.5 2022     Insulin pump status 10/12/2021   Pump Type: Tandum, Tslim Control IQ  Insulin Type: Novolog  Basal 0.95 units/hour at MN  Insulin to CHO Ratio:   1 unit per 10 grams of carbohydrate at MN  Insulin Sensitivity:   40 mg/dl at MN  Target Blood Glucose:   110  Active Insulin: 5 hours  New TDD to be used in pump settin      Other specified anxiety disorders: relational, systemic, legal 2021     Major depressive disorder, recurrent, moderate (HC) 2021     Borderline personality traits - R/O disorder 2021   Likely to be misdiagnosed at \"bipolar disorder\" by less experienced clinicians.      Unspecified episodic mood disorder 2021   Feature of borderline personality traits      Alcohol abuse -binge drinking 2021     ADHD (attention deficit hyperactivity disorder), combined type 2021     Uncontrolled type 1 diabetes mellitus with hyperglycemia (HC) 2020     Tobacco use/nicotine dependence 2019     Chronic GERD 2019     History of lumbosacral spine surgery 2017     Woke with chest discomfort.  Sudden onset at 6 AM.  Substernal.  Worse with deep inspiration.  No fever or chills.  Denies hemoptysis.  Tobacco use disorder.  No trauma.  No recent cough or congestion.  No prior PE.  Was seen in clinic for right " calf tenderness on 5/2/2023 with ultrasound results as per below.  No prior PE/VTE.  Not on estrogen based birth control.  Having some mild abdominal pain.  States she had normal bowel movement yesterday.  No recent abdominal procedure.  Normal appetite.  Only prior abdominal surgery was hysterectomy November 2022.  No peptic ulcer disease.    05/02/2023 7:11 PM CDT    No deep venous thrombosis in the evaluated veins of the bilateral lower extremities.     Superficial thrombus within the small saphenous vein in the right calf.        Dictated by Kofi Molina MD @ 5/2/2023 7:11:21 PM    (Electronically Signed)       Imaging Results - US VENOUS LOWER EXTREMITY BILATERAL (05/02/2023 6:16 PM CDT)  Narrative   05/02/2023 7:11 PM CDT    For Patients:  As a result of the 21st Century Cures Act, medical imaging exams and procedure reports are released immediately into your electronic medical record.  You may view this report before your referring provider.  If you have questions, please contact your health care provider.      INDICATION:  Leg pain and swelling.    TECHNIQUE:  Ultrasound venous duplex bilateral lower extremity. Compression venous exam was performed using gray-scale, color Doppler, and spectral Doppler analysis.    COMPARISON:  None.    FINDINGS:  Deep veins: Sonographic imaging demonstrates the bilateral common femoral, deep femoral, superficial femoral, popliteal, and posterior tibial veins to be fully compressible with normal color Doppler blood flow.     Superficial veins: Greater saphenous veins are fully compressible. There is a thrombus within the small saphenous vein in the right calf.            Allergies:  Allergies   Allergen Reactions     No Known Allergies        Problem List:    Patient Active Problem List    Diagnosis Date Noted     CARDIOVASCULAR SCREENING; LDL GOAL LESS THAN 160 04/03/2012     Priority: Medium        Past Medical History:    Past Medical History:   Diagnosis Date     Anxiety       Bronchitis      Depressive disorder      Diabetes (H)      NO ACTIVE PROBLEMS      Ovarian cyst        Past Surgical History:    Past Surgical History:   Procedure Laterality Date     CYSTECTOMY OVARIAN BENIGN       DILATION AND CURETTAGE       DILATION AND CURETTAGE  2011     GYN SURGERY       HEMORRHOID SURGERY         Family History:    Family History   Problem Relation Age of Onset     Cerebrovascular Disease Maternal Grandfather      Breast Cancer Maternal Grandfather      Asthma Sister      Diabetes Sister 14       Social History:  Marital Status:  Single [1]  Social History     Tobacco Use     Smoking status: Every Day     Packs/day: 0.25     Types: Cigarettes     Smokeless tobacco: Never   Substance Use Topics     Alcohol use: Yes     Comment: 1-2x/month     Drug use: No        Medications:    acetaminophen (TYLENOL) 500 MG tablet  albuterol (2.5 MG/3ML) 0.083% nebulizer solution  albuterol (ALBUTEROL) 108 (90 BASE) MCG/ACT Inhaler  amoxicillin (AMOXIL) 500 MG capsule  amphetamine-dextroamphetamine (ADDERALL) 10 MG tablet  amphetamine-dextroamphetamine (ADDERALL) 15 MG tablet  blood glucose (CONTOUR NEXT TEST) test strip  clonazePAM (KLONOPIN) 0.5 MG tablet  Continuous Blood Gluc Sensor (DEXCOM G6 SENSOR) MISC  Continuous Blood Gluc Transmit (DEXCOM G6 TRANSMITTER) MISC  etonogestrel (NEXPLANON) 68 MG IMPL  famotidine (PEPCID) 20 MG tablet  hydrOXYzine (ATARAX) 50 MG tablet  ibuprofen (ADVIL,MOTRIN) 200 MG tablet  ibuprofen (ADVIL/MOTRIN) 600 MG tablet  insulin aspart (NOVOLOG VIAL) 100 UNITS/ML vial  lisdexamfetamine (VYVANSE) 40 MG capsule  meloxicam (MOBIC) 15 MG tablet  methocarbamol (ROBAXIN) 750 MG tablet  predniSONE (DELTASONE) 20 MG tablet          Review of Systems   All other systems reviewed and are negative.      Physical Exam   BP: (!) 158/136  Pulse: (!) 133  Temp: 97.5  F (36.4  C)  Resp: 24  SpO2: 100 %      Physical Exam  Vitals and nursing note reviewed.   Constitutional:        General: She is in acute distress (Due to discomfort.).      Appearance: She is not ill-appearing.   HENT:      Head: Normocephalic.      Nose: Nose normal.   Eyes:      Conjunctiva/sclera: Conjunctivae normal.      Pupils: Pupils are equal, round, and reactive to light.   Cardiovascular:      Rate and Rhythm: Regular rhythm. Tachycardia present. No extrasystoles are present.     Pulses:           Carotid pulses are 2+ on the right side and 2+ on the left side.       Radial pulses are 2+ on the right side and 2+ on the left side.      Heart sounds: Normal heart sounds. No murmur heard.     No friction rub.   Pulmonary:      Effort: Pulmonary effort is normal.      Breath sounds: Normal breath sounds.      Comments: Breath sounds present all lung fields  Chest:      Chest wall: No deformity, tenderness or crepitus.   Abdominal:      Palpations: Abdomen is soft.      Tenderness: There is abdominal tenderness. There is guarding and rebound.   Musculoskeletal:      Cervical back: Normal range of motion.      Right lower leg: No tenderness. No edema.      Left lower leg: No tenderness. No edema.      Comments: Negative Homans test bilateral   Skin:     General: Skin is warm.      Capillary Refill: Capillary refill takes less than 2 seconds.      Findings: No rash.   Neurological:      General: No focal deficit present.      Mental Status: She is alert and oriented to person, place, and time.   Psychiatric:         Mood and Affect: Mood normal.         Behavior: Behavior normal.         ED Course                 Procedures         EKG:  Interpretation by Fabiano Uriarte DO.   Indication: Chest pain  Sinus tachycardia.  Rate 119 bpm.  No ectopy.  Normal ST-T repolarization changes.  Normal axis.  Impressions abnormal EKG except for accelerated heart rate      Critical Care time:  20 minutes preparing patient for immediate surgery, consulting general surgery, discussing CT results with radiologist, family care conference  with patient and mother.           Results for orders placed or performed during the hospital encounter of 05/19/23 (from the past 24 hour(s))   CBC with platelets differential    Narrative    The following orders were created for panel order CBC with platelets differential.  Procedure                               Abnormality         Status                     ---------                               -----------         ------                     CBC with platelets and d...[[id number]]  Abnormal            Final result                 Please view results for these tests on the individual orders.   Basic metabolic panel   Result Value Ref Range    Sodium 134 (L) 136 - 145 mmol/L    Potassium 3.4 3.4 - 5.3 mmol/L    Chloride 100 98 - 107 mmol/L    Carbon Dioxide (CO2) 19 (L) 22 - 29 mmol/L    Anion Gap 15 7 - 15 mmol/L    Urea Nitrogen 5.4 (L) 6.0 - 20.0 mg/dL    Creatinine 0.55 0.51 - 0.95 mg/dL    Calcium 9.5 8.6 - 10.0 mg/dL    Glucose 300 (H) 70 - 99 mg/dL    GFR Estimate >90 >60 mL/min/1.73m2   Troponin T, High Sensitivity   Result Value Ref Range    Troponin T, High Sensitivity <6 <=14 ng/L   CBC with platelets and differential   Result Value Ref Range    WBC Count 15.1 (H) 4.0 - 11.0 10e3/uL    RBC Count 4.67 3.80 - 5.20 10e6/uL    Hemoglobin 14.1 11.7 - 15.7 g/dL    Hematocrit 42.4 35.0 - 47.0 %    MCV 91 78 - 100 fL    MCH 30.2 26.5 - 33.0 pg    MCHC 33.3 31.5 - 36.5 g/dL    RDW 13.9 10.0 - 15.0 %    Platelet Count 340 150 - 450 10e3/uL    % Neutrophils 76 %    % Lymphocytes 16 %    % Monocytes 6 %    % Eosinophils 1 %    % Basophils 0 %    % Immature Granulocytes 1 %    NRBCs per 100 WBC 0 <1 /100    Absolute Neutrophils 11.6 (H) 1.6 - 8.3 10e3/uL    Absolute Lymphocytes 2.4 0.8 - 5.3 10e3/uL    Absolute Monocytes 0.8 0.0 - 1.3 10e3/uL    Absolute Eosinophils 0.1 0.0 - 0.7 10e3/uL    Absolute Basophils 0.1 0.0 - 0.2 10e3/uL    Absolute Immature Granulocytes 0.1 <=0.4 10e3/uL    Absolute NRBCs 0.0 10e3/uL    Extra Tube    Narrative    The following orders were created for panel order Extra Tube.  Procedure                               Abnormality         Status                     ---------                               -----------         ------                     Extra Green Top (Lithium...[[id number]]                      Final result                 Please view results for these tests on the individual orders.   Extra Green Top (Lithium Heparin) Tube   Result Value Ref Range    Hold Specimen [facility]    D dimer quantitative   Result Value Ref Range    D-Dimer Quantitative 0.76 (H) 0.00 - 0.50 ug/mL FEU    Narrative    This D-dimer assay is intended for use in conjunction with a clinical pretest probability assessment model to exclude pulmonary embolism (PE) and deep venous thrombosis (DVT) in outpatients suspected of PE or DVT. The cut-off value is 0.50 ug/mL FEU.   Extra Tube    Narrative    The following orders were created for panel order Extra Tube.  Procedure                               Abnormality         Status                     ---------                               -----------         ------                     Extra Red Top Tube[[id number]]                               Final result                 Please view results for these tests on the individual orders.   Extra Red Top Tube   Result Value Ref Range    Hold Specimen [facility]    CT Chest Pulmonary Embolism w Contrast    Narrative    CT CHEST PULMONARY EMBOLISM WITH CONTRAST May 19, 2023 9:06 AM    HISTORY: Sharp pleuritic type chest pain with elevated D-dimer.    TECHNIQUE: Scans obtained from the apices through the diaphragm with  IV contrast. ISOVUE-370, 70mL IV injected. Radiation dose for this  scan was reduced using automated exposure control, adjustment of the  mA and/or kV according to patient size, or iterative reconstruction  technique. 2D and 3D MIP reconstructions were performed by the CT  technologist    COMPARISON: Chest x-ray on  2/21/2017.    FINDINGS:  Chest/mediastinum: No evidence of pulmonary embolism. No cardiomegaly  or significant pericardial effusion. No significant mediastinal or  hilar lymphadenopathy. No significant atherosclerotic vascular  calcification of the coronary arteries.     Lungs and pleura: No pleural effusion or pneumothorax. Mild basilar  pulmonary opacities, likely atelectasis. Few scattered calcified  pulmonary granulomas.    Upper abdomen: Limited evaluation of the upper abdomen due to lack of  coverage. Large amount of free peritoneal air, likely related to bowel  perforation.    Bones and soft tissue: No suspicious osseous lesion.      Impression    IMPRESSION:   1. No evidence of pulmonary embolism.  2. Large amount of free peritoneal air, likely related to bowel  perforation.    Findings were discussed with the patient's provider Dr. NELL MEDINA at 9:20 AM on 5/19/2023.    LIZZ GASCA MD         SYSTEM ID:  [id number]       Medications   ketorolac (TORADOL) injection 30 mg (has no administration in time range)       Assessments & Plan (with Medical Decision Making)  Heidi is a 37-year-old type I/is a dependent diabetic.  Presented with chest pain came on early this morning that awoke around 6 AM.  Worse take a deep breath.  Initially when she arrived she was not complaining of much abdominal pain but the abdominal pain started to escalate.  Her abdomen on initial exam did not have rebound or guarding and then she developed generalized peritoneal signs with guarding and rebound.  We initially proceed with a CT of the chest because of recent complaint of leg swelling and pain.  An ultrasound was completed May 2 but only showed superficial thrombophlebitis.  D-dimer was elevated 0.7.  CT chest was clear but we have a lot of free air in the abdominal cavity concerning for perforated hollow viscus.  Patient is now n.p.o.  2 IVs established.  Pain and nausea medications ordered.  Surgery consult  requested.  CT of the abdomen with IV contrast pending.  IV Zosyn ordered by surgery.  Patient notified that she will be going straight from the ED to the OR.     I have reviewed the nursing notes.    I have reviewed the findings, diagnosis, plan and need for follow up with the patient.          Medical Decision Making  The patient's presentation was of high complexity (an acute health issue posing potential threat to life or bodily function).    The patient's evaluation involved:  ordering and/or review of 3+ test(s) in this encounter (see separate area of note for details)  discussion of management or test interpretation with another health professional (see separate area of note for details)    The patient's management necessitated high risk (a decision regarding hospitalization).        New Prescriptions    No medications on file       Final diagnoses:   Surgical abdomen - Suspect perforated hollow viscus with free air       5/19/2023   [facility] EMERGENCY DEPT     Fabiano Uriarte,   05/19/23 1017

## 2023-05-19 NOTE — ANESTHESIA PROCEDURE NOTES
TAP Procedure Note    Pre-Procedure   Staff -        CRNA: Phil Roland APRN CRNA       Performed By: CRNA       Location: OR       Procedure Start/Stop Times: 5/19/2023 2:07 PM and 5/19/2023 2:15 PM       Pre-Anesthestic Checklist: patient identified, IV checked, site marked, risks and benefits discussed, informed consent, monitors and equipment checked, pre-op evaluation, at physician/surgeon's request and post-op pain management  Timeout:       Correct Patient: Yes        Correct Procedure: Yes        Correct Site: Yes        Correct Position: Yes        Correct Laterality: Yes        Site Marked: Yes  Procedure Documentation  Procedure: TAP       Laterality: bilateral       Patient Position: supine       Patient Prep/Sterile Barriers: sterile gloves, mask       Skin prep: Chloraprep       Needle Type: insulated       Needle Gauge: 22.        Needle Length (millimeters): 100        Ultrasound guided       1. Ultrasound was used to identify targeted nerve, plexus, vascular marker, or fascial plane and place a needle adjacent to it in real-time.       2. Ultrasound was used to visualize the spread of anesthetic in close proximity to the above referenced structure.       4. The visualized anatomic structures appeared normal.       5. There were no apparent abnormal pathologic findings.    Assessment/Narrative         The placement was negative for: blood aspirated, painful injection and site bleeding       Paresthesias: No.       Test dose of mL at.         Test dose negative, 3 minutes after injection, for signs of intravascular, subdural, or intrathecal injection.       Bolus given via needle..        Secured via.        Insertion/Infusion Method: Single Shot       Complications: none       Injection made incrementally with aspirations every 5 mL.    Medication(s) Administered   Medication Administration Time: 5/19/2023 2:07 PM     Comments:  Pt tolerated the procedure well as under General Anesthesia.  There  "was good visualization of the space between the internal oblique and the transverses abdominis.  There was also good visualization of fluid dissection in this layer.  No complications were noted.  I attempted to save a permanent image but the ultrasound memory was full.  I will follow up with this pt if needed.      FOR [facility] ([facility] [place]) ONLY:   Pain Team Contact information: please page the Pain Team Via [employer]. Search \"Pain\". During daytime hours, please page the attending first. At night please page the resident first.      "

## 2023-05-19 NOTE — H&P (VIEW-ONLY)
"  History     Chief Complaint   Patient presents with     Chest Pain     Shortness of Breath     HPI  Heidi Su is a 37 year old female who has active health problems of:  Problem List:   Patient Active Problem List   Diagnosis Date Noted     Lumbar radiculopathy 2022     S/P laparoscopic assisted vaginal hysterectomy (LAVH) 2022     Surgical menopause on hormone replacement therapy 2022     Menorrhagia with regular cycle 10/06/2022     Gingivitis 2022     DKA, type 1 (HC) 2022     HA (headache)/ right facial pain 2022     Nexplanon in place 2022     Uncontrolled type 1 diabetes mellitus a1c=10.5 2022     Insulin pump status 10/12/2021   Pump Type: Tandum, Tslim Control IQ  Insulin Type: Novolog  Basal 0.95 units/hour at MN  Insulin to CHO Ratio:   1 unit per 10 grams of carbohydrate at MN  Insulin Sensitivity:   40 mg/dl at MN  Target Blood Glucose:   110  Active Insulin: 5 hours  New TDD to be used in pump settin      Other specified anxiety disorders: relational, systemic, legal 2021     Major depressive disorder, recurrent, moderate (HC) 2021     Borderline personality traits - R/O disorder 2021   Likely to be misdiagnosed at \"bipolar disorder\" by less experienced clinicians.      Unspecified episodic mood disorder 2021   Feature of borderline personality traits      Alcohol abuse -binge drinking 2021     ADHD (attention deficit hyperactivity disorder), combined type 2021     Uncontrolled type 1 diabetes mellitus with hyperglycemia (HC) 2020     Tobacco use/nicotine dependence 2019     Chronic GERD 2019     History of lumbosacral spine surgery 2017     Woke with chest discomfort.  Sudden onset at 6 AM.  Substernal.  Worse with deep inspiration.  No fever or chills.  Denies hemoptysis.  Tobacco use disorder.  No trauma.  No recent cough or congestion.  No prior PE.  Was seen in clinic for right " calf tenderness on 5/2/2023 with ultrasound results as per below.  No prior PE/VTE.  Not on estrogen based birth control.  Having some mild abdominal pain.  States she had normal bowel movement yesterday.  No recent abdominal procedure.  Normal appetite.  Only prior abdominal surgery was hysterectomy November 2022.  No peptic ulcer disease.    05/02/2023 7:11 PM CDT    No deep venous thrombosis in the evaluated veins of the bilateral lower extremities.     Superficial thrombus within the small saphenous vein in the right calf.        Dictated by Kofi Molina MD @ 5/2/2023 7:11:21 PM    (Electronically Signed)       Imaging Results - US VENOUS LOWER EXTREMITY BILATERAL (05/02/2023 6:16 PM CDT)  Narrative   05/02/2023 7:11 PM CDT    For Patients:  As a result of the 21st Century Cures Act, medical imaging exams and procedure reports are released immediately into your electronic medical record.  You may view this report before your referring provider.  If you have questions, please contact your health care provider.      INDICATION:  Leg pain and swelling.    TECHNIQUE:  Ultrasound venous duplex bilateral lower extremity. Compression venous exam was performed using gray-scale, color Doppler, and spectral Doppler analysis.    COMPARISON:  None.    FINDINGS:  Deep veins: Sonographic imaging demonstrates the bilateral common femoral, deep femoral, superficial femoral, popliteal, and posterior tibial veins to be fully compressible with normal color Doppler blood flow.     Superficial veins: Greater saphenous veins are fully compressible. There is a thrombus within the small saphenous vein in the right calf.            Allergies:  Allergies   Allergen Reactions     No Known Allergies        Problem List:    Patient Active Problem List    Diagnosis Date Noted     CARDIOVASCULAR SCREENING; LDL GOAL LESS THAN 160 04/03/2012     Priority: Medium        Past Medical History:    Past Medical History:   Diagnosis Date     Anxiety       Bronchitis      Depressive disorder      Diabetes (H)      NO ACTIVE PROBLEMS      Ovarian cyst        Past Surgical History:    Past Surgical History:   Procedure Laterality Date     CYSTECTOMY OVARIAN BENIGN       DILATION AND CURETTAGE       DILATION AND CURETTAGE  2011     GYN SURGERY       HEMORRHOID SURGERY         Family History:    Family History   Problem Relation Age of Onset     Cerebrovascular Disease Maternal Grandfather      Breast Cancer Maternal Grandfather      Asthma Sister      Diabetes Sister 14       Social History:  Marital Status:  Single [1]  Social History     Tobacco Use     Smoking status: Every Day     Packs/day: 0.25     Types: Cigarettes     Smokeless tobacco: Never   Substance Use Topics     Alcohol use: Yes     Comment: 1-2x/month     Drug use: No        Medications:    acetaminophen (TYLENOL) 500 MG tablet  albuterol (2.5 MG/3ML) 0.083% nebulizer solution  albuterol (ALBUTEROL) 108 (90 BASE) MCG/ACT Inhaler  amoxicillin (AMOXIL) 500 MG capsule  amphetamine-dextroamphetamine (ADDERALL) 10 MG tablet  amphetamine-dextroamphetamine (ADDERALL) 15 MG tablet  blood glucose (CONTOUR NEXT TEST) test strip  clonazePAM (KLONOPIN) 0.5 MG tablet  Continuous Blood Gluc Sensor (DEXCOM G6 SENSOR) MISC  Continuous Blood Gluc Transmit (DEXCOM G6 TRANSMITTER) MISC  etonogestrel (NEXPLANON) 68 MG IMPL  famotidine (PEPCID) 20 MG tablet  hydrOXYzine (ATARAX) 50 MG tablet  ibuprofen (ADVIL,MOTRIN) 200 MG tablet  ibuprofen (ADVIL/MOTRIN) 600 MG tablet  insulin aspart (NOVOLOG VIAL) 100 UNITS/ML vial  lisdexamfetamine (VYVANSE) 40 MG capsule  meloxicam (MOBIC) 15 MG tablet  methocarbamol (ROBAXIN) 750 MG tablet  predniSONE (DELTASONE) 20 MG tablet          Review of Systems   All other systems reviewed and are negative.      Physical Exam   BP: (!) 158/136  Pulse: (!) 133  Temp: 97.5  F (36.4  C)  Resp: 24  SpO2: 100 %      Physical Exam  Vitals and nursing note reviewed.   Constitutional:        General: She is in acute distress (Due to discomfort.).      Appearance: She is not ill-appearing.   HENT:      Head: Normocephalic.      Nose: Nose normal.   Eyes:      Conjunctiva/sclera: Conjunctivae normal.      Pupils: Pupils are equal, round, and reactive to light.   Cardiovascular:      Rate and Rhythm: Regular rhythm. Tachycardia present. No extrasystoles are present.     Pulses:           Carotid pulses are 2+ on the right side and 2+ on the left side.       Radial pulses are 2+ on the right side and 2+ on the left side.      Heart sounds: Normal heart sounds. No murmur heard.     No friction rub.   Pulmonary:      Effort: Pulmonary effort is normal.      Breath sounds: Normal breath sounds.      Comments: Breath sounds present all lung fields  Chest:      Chest wall: No deformity, tenderness or crepitus.   Abdominal:      Palpations: Abdomen is soft.      Tenderness: There is abdominal tenderness. There is guarding and rebound.   Musculoskeletal:      Cervical back: Normal range of motion.      Right lower leg: No tenderness. No edema.      Left lower leg: No tenderness. No edema.      Comments: Negative Homans test bilateral   Skin:     General: Skin is warm.      Capillary Refill: Capillary refill takes less than 2 seconds.      Findings: No rash.   Neurological:      General: No focal deficit present.      Mental Status: She is alert and oriented to person, place, and time.   Psychiatric:         Mood and Affect: Mood normal.         Behavior: Behavior normal.         ED Course                 Procedures         EKG:  Interpretation by Fabiano Uriarte DO.   Indication: Chest pain  Sinus tachycardia.  Rate 119 bpm.  No ectopy.  Normal ST-T repolarization changes.  Normal axis.  Impressions abnormal EKG except for accelerated heart rate      Critical Care time:  20 minutes preparing patient for immediate surgery, consulting general surgery, discussing CT results with radiologist, family care conference  with patient and mother.           Results for orders placed or performed during the hospital encounter of 05/19/23 (from the past 24 hour(s))   CBC with platelets differential    Narrative    The following orders were created for panel order CBC with platelets differential.  Procedure                               Abnormality         Status                     ---------                               -----------         ------                     CBC with platelets and d...[[id number]]  Abnormal            Final result                 Please view results for these tests on the individual orders.   Basic metabolic panel   Result Value Ref Range    Sodium 134 (L) 136 - 145 mmol/L    Potassium 3.4 3.4 - 5.3 mmol/L    Chloride 100 98 - 107 mmol/L    Carbon Dioxide (CO2) 19 (L) 22 - 29 mmol/L    Anion Gap 15 7 - 15 mmol/L    Urea Nitrogen 5.4 (L) 6.0 - 20.0 mg/dL    Creatinine 0.55 0.51 - 0.95 mg/dL    Calcium 9.5 8.6 - 10.0 mg/dL    Glucose 300 (H) 70 - 99 mg/dL    GFR Estimate >90 >60 mL/min/1.73m2   Troponin T, High Sensitivity   Result Value Ref Range    Troponin T, High Sensitivity <6 <=14 ng/L   CBC with platelets and differential   Result Value Ref Range    WBC Count 15.1 (H) 4.0 - 11.0 10e3/uL    RBC Count 4.67 3.80 - 5.20 10e6/uL    Hemoglobin 14.1 11.7 - 15.7 g/dL    Hematocrit 42.4 35.0 - 47.0 %    MCV 91 78 - 100 fL    MCH 30.2 26.5 - 33.0 pg    MCHC 33.3 31.5 - 36.5 g/dL    RDW 13.9 10.0 - 15.0 %    Platelet Count 340 150 - 450 10e3/uL    % Neutrophils 76 %    % Lymphocytes 16 %    % Monocytes 6 %    % Eosinophils 1 %    % Basophils 0 %    % Immature Granulocytes 1 %    NRBCs per 100 WBC 0 <1 /100    Absolute Neutrophils 11.6 (H) 1.6 - 8.3 10e3/uL    Absolute Lymphocytes 2.4 0.8 - 5.3 10e3/uL    Absolute Monocytes 0.8 0.0 - 1.3 10e3/uL    Absolute Eosinophils 0.1 0.0 - 0.7 10e3/uL    Absolute Basophils 0.1 0.0 - 0.2 10e3/uL    Absolute Immature Granulocytes 0.1 <=0.4 10e3/uL    Absolute NRBCs 0.0 10e3/uL    Extra Tube    Narrative    The following orders were created for panel order Extra Tube.  Procedure                               Abnormality         Status                     ---------                               -----------         ------                     Extra Green Top (Lithium...[[id number]]                      Final result                 Please view results for these tests on the individual orders.   Extra Green Top (Lithium Heparin) Tube   Result Value Ref Range    Hold Specimen [facility]    D dimer quantitative   Result Value Ref Range    D-Dimer Quantitative 0.76 (H) 0.00 - 0.50 ug/mL FEU    Narrative    This D-dimer assay is intended for use in conjunction with a clinical pretest probability assessment model to exclude pulmonary embolism (PE) and deep venous thrombosis (DVT) in outpatients suspected of PE or DVT. The cut-off value is 0.50 ug/mL FEU.   Extra Tube    Narrative    The following orders were created for panel order Extra Tube.  Procedure                               Abnormality         Status                     ---------                               -----------         ------                     Extra Red Top Tube[[id number]]                               Final result                 Please view results for these tests on the individual orders.   Extra Red Top Tube   Result Value Ref Range    Hold Specimen [facility]    CT Chest Pulmonary Embolism w Contrast    Narrative    CT CHEST PULMONARY EMBOLISM WITH CONTRAST May 19, 2023 9:06 AM    HISTORY: Sharp pleuritic type chest pain with elevated D-dimer.    TECHNIQUE: Scans obtained from the apices through the diaphragm with  IV contrast. ISOVUE-370, 70mL IV injected. Radiation dose for this  scan was reduced using automated exposure control, adjustment of the  mA and/or kV according to patient size, or iterative reconstruction  technique. 2D and 3D MIP reconstructions were performed by the CT  technologist    COMPARISON: Chest x-ray on  2/21/2017.    FINDINGS:  Chest/mediastinum: No evidence of pulmonary embolism. No cardiomegaly  or significant pericardial effusion. No significant mediastinal or  hilar lymphadenopathy. No significant atherosclerotic vascular  calcification of the coronary arteries.     Lungs and pleura: No pleural effusion or pneumothorax. Mild basilar  pulmonary opacities, likely atelectasis. Few scattered calcified  pulmonary granulomas.    Upper abdomen: Limited evaluation of the upper abdomen due to lack of  coverage. Large amount of free peritoneal air, likely related to bowel  perforation.    Bones and soft tissue: No suspicious osseous lesion.      Impression    IMPRESSION:   1. No evidence of pulmonary embolism.  2. Large amount of free peritoneal air, likely related to bowel  perforation.    Findings were discussed with the patient's provider Dr. NELL MEDINA at 9:20 AM on 5/19/2023.    LIZZ GASCA MD         SYSTEM ID:  [id number]       Medications   ketorolac (TORADOL) injection 30 mg (has no administration in time range)       Assessments & Plan (with Medical Decision Making)  Heidi is a 37-year-old type I/is a dependent diabetic.  Presented with chest pain came on early this morning that awoke around 6 AM.  Worse take a deep breath.  Initially when she arrived she was not complaining of much abdominal pain but the abdominal pain started to escalate.  Her abdomen on initial exam did not have rebound or guarding and then she developed generalized peritoneal signs with guarding and rebound.  We initially proceed with a CT of the chest because of recent complaint of leg swelling and pain.  An ultrasound was completed May 2 but only showed superficial thrombophlebitis.  D-dimer was elevated 0.7.  CT chest was clear but we have a lot of free air in the abdominal cavity concerning for perforated hollow viscus.  Patient is now n.p.o.  2 IVs established.  Pain and nausea medications ordered.  Surgery consult  requested.  CT of the abdomen with IV contrast pending.  IV Zosyn ordered by surgery.  Patient notified that she will be going straight from the ED to the OR.     I have reviewed the nursing notes.    I have reviewed the findings, diagnosis, plan and need for follow up with the patient.          Medical Decision Making  The patient's presentation was of high complexity (an acute health issue posing potential threat to life or bodily function).    The patient's evaluation involved:  ordering and/or review of 3+ test(s) in this encounter (see separate area of note for details)  discussion of management or test interpretation with another health professional (see separate area of note for details)    The patient's management necessitated high risk (a decision regarding hospitalization).        New Prescriptions    No medications on file       Final diagnoses:   Surgical abdomen - Suspect perforated hollow viscus with free air       5/19/2023   [facility] EMERGENCY DEPT     Fabiano Uriarte,   05/19/23 1017

## 2023-05-19 NOTE — OR NURSING
Transfer from  PACU to Room 217  Transferred to bed via TRANSFER BOARD (Glyder Mat,Transfer Boar,Slider Sheet)    S: 38 y/o FEMALE  S/P DIAGNOSTIC LAP       Anesthesia Type:  GEN AND TAP BLOCK       Surgeon:  Dr. HUFFMAN       Allergies:  See Medication Reconciliation Record       DNR: NO  (Yes,No)    B:  Pertinent Medical History:   Past Medical History:   Diagnosis Date     Anxiety      Bronchitis      Depressive disorder      Diabetes (H)      NO ACTIVE PROBLEMS      Ovarian cyst       (CHF; Heart Disease; Lung Disease; Chronic Pain; Diabetes; Other (Comment)          Surgical History:    Past Surgical History:   Procedure Laterality Date     CYSTECTOMY OVARIAN BENIGN       DILATION AND CURETTAGE       DILATION AND CURETTAGE  2011     GYN SURGERY       HEMORRHOID SURGERY         A:  EBL: 10        IVF:  3100        UOP:  350        NPO:  X___Yes ___No         Vomiting:  ___Yes X___No         Drainage: SMALL        Skin Integrity: ABD INCISION AND 1 TROCAR (Normal; Pressure Ulcer (Location)        RFO: ___Yes_X__No (identify item if present)        SSI Patient?  _X__Yes___No (if yes, see checklist for actions)        Brace/sling/equipment:  _X__Yes___No (identify item if present) NG TUBE PRESENT         See PACU record for ongoing assessment, vital signs and pain assessment.    R: Post-Op vitals and assessments as ordered/indicated per patient's condition.       Follow Post-Op orders and notify Physician prn.       Continue to involve patient/family in plan of care and discharge planning.       Reinforce Pre-Operative education.       Implement skin safety interventions as appropriate.    REPORT TO ANDRIY

## 2023-05-19 NOTE — SIGNIFICANT EVENT
Significant Event Note    Time of event: 5:35 PM May 19, 2023    Description of event:  Results of venous blood gas were reviewed demonstrating acute respiratory acidosis.  This coincided with administration of IV Dilaudid right about the time of her lab draw.  Patient is being monitored on continuous capnography with current end-tidal CO2 ranging 35-41 and the patient is now awake and responsive.  She has not been hypoxic and is not receiving oxygen supplementation.    Plan:  Recheck VBG now  If she develops rapidly progressive respiratory acidosis, may need ventilatory assistance with high flow nasal cannula and/or BiPAP    If she does not tolerate current doses of IV narcotic analgesics due to sedation and respiratory acidosis, recommend contacting surgeon to discuss whether PCA pump without continuous dose is a clinically appropriate treatment option for this patient because use of PCA without continuous dose may reduce the patient's risk for acute respiratory depression and acute respiratory acidosis    Will also minimize use of benzodiazepines with opiates for same reasons    Discussed with: bedside nurse    Jcarlos Cho MD

## 2023-05-19 NOTE — SIGNIFICANT EVENT
Significant Event Note    Time of event: 6:35 PM May 19, 2023    Description of event:    Lab results reviewed and are notable for decreased carbon dioxide of 20 although anion gap remains normal, blood sugar 200, positive serum ketones 0.5, and venous pH 7.28 (improved from previously) with venous bicarbonate 20 (worsened from previously) and venous PCO2 43 (improved from previously).    These results are concerning for early diabetic ketoacidosis and resolution of previous acute respiratory acidosis.    Plan:  Continue insulin infusion per protocol  Ordered serial lab monitoring of blood gases, ketones, and electrolytes overnight  Adjusted IV fluids from normal saline to D5 half-normal saline with potassium chloride while she is receiving insulin infusion    Ordered low-dose Versed 0.5 mg IV every 4 hours as needed for anxiety    Reduced higher dose IV Dilaudid from 0.5 mg every 2 hours to 0.3 mg every 1 hour as needed for severe pain to try to reduce risk of recurrent respiratory acidosis from IV narcotics    Also wrote orders to transfer patient to ICU status because she needs continuous insulin infusion and associated monitoring due to early DKA and her postoperative state with underlying chronic type 1 diabetes normally managed with insulin pump which she is not presently able to use    Discussed with: bedside nurse    Jcarlos Cho MD

## 2023-05-19 NOTE — TELEPHONE ENCOUNTER
Type of surgery: LAPAROSCOPY, DIAGNOSTIC, BY GENERAL SURGERY (N/A)   LAPAROTOMY (N/A)     Location of surgery: [facility] OR  Date and time of surgery: 05/19/2023  Surgeon: TIFFANY  Pre-Op Appt Date: seen on call   Post-Op Appt Date: tbd   Packet sent out: No  Pre-cert/Authorization completed:  No  Date:

## 2023-05-19 NOTE — ANESTHESIA CARE TRANSFER NOTE
Patient: Heidi Su    Procedure: Procedure(s):  LAPAROSCOPY, DIAGNOSTIC  LAPAROTOMY  Open Appendectomy       Diagnosis: Free intraperitoneal air [K66.8]  Diagnosis Additional Information: No value filed.    Anesthesia Type:   General     Note:    Oropharynx: oropharynx clear of all foreign objects and spontaneously breathing  Level of Consciousness: drowsy  Oxygen Supplementation: face mask    Independent Airway: airway patency satisfactory and stable  Dentition: dentition unchanged  Vital Signs Stable: post-procedure vital signs reviewed and stable  Report to RN Given: handoff report given  Patient transferred to: PACU    Handoff Report: Identifed the Patient, Identified the Reponsible Provider, Reviewed the pertinent medical history, Discussed the surgical course, Reviewed Intra-OP anesthesia mangement and issues during anesthesia, Set expectations for post-procedure period and Allowed opportunity for questions and acknowledgement of understanding      Vitals:  Vitals Value Taken Time   /53 05/19/23 1500   Temp 99.86  F (37.7  C) 05/19/23 1504   Pulse 95 05/19/23 1504   Resp 17 05/19/23 1504   SpO2 99 % 05/19/23 1504   Vitals shown include unvalidated device data.    Electronically Signed By: EDGARDO Nieto CRNA  May 19, 2023  3:05 PM

## 2023-05-19 NOTE — LETTER
Transition Communication Hand-off for Care Transitions to Next Level of Care Provider    Name: Heidi Su  : 1985  MRN #: [id number]  Primary Care Provider: Debra Larson     Primary Clinic: [facility] [address]     Reason for Hospitalization:  Surgical abdomen [R10.0]  Free intraperitoneal air [K66.8]  Admit Date/Time: 2023  6:46 AM  Discharge Date: 23  Payor Source: Payor: BLUE PLUS / Plan: BLUE PLUS ADVANTAGE [place] / Product Type: HMO /          Reason for Communication Hand-off Referral: Fragility    Discharge Plan: Home with family support    Discharge Needs Assessment:  Needs      Flowsheet Row Most Recent Value   Equipment Currently Used at Home none          Follow-up specialty is recommended: Yes    Follow-up plan:  No future appointments.      ASAEL FOLEY    AVS/Discharge Summary is the source of truth; this is a helpful guide for improved communication of patient story

## 2023-05-19 NOTE — PROGRESS NOTES
"Patient continues to have periods of outbursts of yelling and swearing, almost hyperventilating when she is in pain. Need to remind her to concentrate on her breathing and to slow her breathing down.  Notice this more when family is in the room. Have offered her ear plugs again for her \"sensory overload\" (patients words) related to her ADHD from the sound of the suction tubing NG on low intermittent suction. Have not gotten any additional fluid out of NG since arrival, cannister currently at 75 ml from PACU.    Midline abdominal incision drainage outlined with black marker.       "

## 2023-05-19 NOTE — INTERVAL H&P NOTE
I have reviewed the surgical (or preoperative) H&P that is linked to this encounter, and examined the patient. There are no significant changes    Clinical Conditions Present on Arrival:  Clinically Significant Risk Factors Present on Admission         # Hyponatremia: Lowest Na = 134 mmol/L in last 30 days, will monitor as appropriate

## 2023-05-19 NOTE — CONSULTS
[facility] Emergency Department Surgery Consult    Heidi Su MRN# [id number]   Age: 37 year old YOB: 1985     Date of Admission:  5/19/2023    Reason for consult:  Free intraperitoneal air       Requesting physician: Dr. Uriarte       Level of consult: Consult, follow and place orders           Impression and Plan:   Impression:   Free intraperitoneal air from most likely a perforated duodenal ulcer.  Early sepsis.  Poorly controlled diabetes.        Plan:   IV fluids, IV antibiotics and OR for exploratory laparotomy.   The procedure, risks, benefits, and alternatives were discussed and the patient agrees to proceed.             Chief Complaint:   Abdominal pain     History is obtained from the patient         History of Present Illness:   This 37 year old female is being who woke up at 6 AM with severe upper abdominal pain and shortness of breath.  She denies any prior episodes.  The pain was the worst she is ever felt.  She went to the ER and a CT of the chest was done for possible pulmonary embolus.  She does have a history of superficial phlebitis which she has taken to 2 aspirin daily.  She is reportedly on a PPI.  She says her heart rate was high as 150 at home.  She reports her last official A1c was 10 down from 12.  She does have a computer monitor that follows her and she reports the last was in the mid eights.  Currently she is resting on the stretcher complaining of abdominal pain.       Past Medical History:     Past Medical History:   Diagnosis Date     Anxiety      Bronchitis      Depressive disorder      Diabetes (H)      NO ACTIVE PROBLEMS      Ovarian cyst              Past Surgical History:     Past Surgical History:   Procedure Laterality Date     CYSTECTOMY OVARIAN BENIGN       DILATION AND CURETTAGE       DILATION AND CURETTAGE  2011     GYN SURGERY       HEMORRHOID SURGERY               Social History:     Social History     Tobacco Use     Smoking status: Every Day      Packs/day: 0.25     Types: Cigarettes     Smokeless tobacco: Never   Vaping Use     Vaping status: Not on file   Substance Use Topics     Alcohol use: Yes     Comment: 1-2x/month             Family History:     Family History   Problem Relation Age of Onset     Cerebrovascular Disease Maternal Grandfather      Breast Cancer Maternal Grandfather      Asthma Sister      Diabetes Sister 14              Allergies:     Allergies   Allergen Reactions     No Known Allergies              Medications:     Current Facility-Administered Medications   Medication     0.9% sodium chloride BOLUS    Followed by     sodium chloride 0.9% infusion     HYDROmorphone (PF) (DILAUDID) injection 0.5 mg     ondansetron (ZOFRAN) injection 4 mg     piperacillin-tazobactam (ZOSYN) 4.5 g vial to attach to  mL bag     Current Outpatient Medications   Medication Sig     acetaminophen (TYLENOL) 500 MG tablet Take 1-2 tablets by mouth every 6 hours as needed.     albuterol (2.5 MG/3ML) 0.083% nebulizer solution Take 3 mLs by nebulization every 4 hours as needed for shortness of breath / dyspnea (coughing jags).     albuterol (ALBUTEROL) 108 (90 BASE) MCG/ACT Inhaler Inhale 2 puffs into the lungs every 4 hours as needed for shortness of breath / dyspnea     amoxicillin (AMOXIL) 500 MG capsule Take 1 capsule (500 mg) by mouth 2 times daily     amphetamine-dextroamphetamine (ADDERALL) 10 MG tablet Take 10 mg by mouth daily as needed At 3pm     amphetamine-dextroamphetamine (ADDERALL) 15 MG tablet Take 15 mg by mouth daily At noon     blood glucose (CONTOUR NEXT TEST) test strip TEST 5- 6 TIMES/DAY, uncontrolled diabetes     clonazePAM (KLONOPIN) 0.5 MG tablet Take 0.5 mg by mouth daily as needed for anxiety     Continuous Blood Gluc Sensor (DEXCOM G6 SENSOR) MISC USE AS DIRECTED TO CHECK BLOOD SUGARS. CHANGE EVERY 10 DAYS     Continuous Blood Gluc Transmit (DEXCOM G6 TRANSMITTER) MISC      etonogestrel (NEXPLANON) 68 MG IMPL Inject 1 each  Subcutaneous     famotidine (PEPCID) 20 MG tablet Take 20 mg by mouth 2 times daily     hydrOXYzine (ATARAX) 50 MG tablet Take  mg by mouth nightly as needed for anxiety or sleep     ibuprofen (ADVIL,MOTRIN) 200 MG tablet Take 4 tablets (800 mg) by mouth every 8 hours as needed for pain (with food)     ibuprofen (ADVIL/MOTRIN) 600 MG tablet Take 600 mg by mouth as needed     insulin aspart (NOVOLOG VIAL) 100 UNITS/ML vial Inject 60 Units Subcutaneous daily     lisdexamfetamine (VYVANSE) 40 MG capsule Take 40 mg by mouth every morning     meloxicam (MOBIC) 15 MG tablet Take 1 tablet (15 mg) by mouth daily for 14 doses Take with food to prevent stomach irritation.     methocarbamol (ROBAXIN) 750 MG tablet Take 1 tablet (750 mg) by mouth 4 times daily as needed for muscle spasms     predniSONE (DELTASONE) 20 MG tablet 1 tab twice daily x 5 days, then 1 tab once daily x 5 days             Review of Systems:   The review of systems was positive for the following findings.  None.  The remainder of the review of systems was unremarkable.          Physical Exam:   PE:  B/P: 129/86, T: 97.5, P: 109, R: 12  General: well developed, well nourished WF who appears their stated age  HEENT: NC/AT, EOMI, (-)icterus, (-)injection  Neck: Supple, No JVD  Chest: CTA  Heart: S1, S2, (-)m/r/g  Abd: Soft, diffusely tender with rigidity, non distended, non tender, no masses  Ext; Warm, no edema  Psych: AAOx3  Neuro: No focal deficits            Data:   All laboratory data reviewed  Results for orders placed or performed during the hospital encounter of 05/19/23 (from the past 24 hour(s))   CBC with platelets differential    Narrative    The following orders were created for panel order CBC with platelets differential.  Procedure                               Abnormality         Status                     ---------                               -----------         ------                     CBC with platelets and d...[[id number]]   Abnormal            Final result                 Please view results for these tests on the individual orders.   Basic metabolic panel   Result Value Ref Range    Sodium 134 (L) 136 - 145 mmol/L    Potassium 3.4 3.4 - 5.3 mmol/L    Chloride 100 98 - 107 mmol/L    Carbon Dioxide (CO2) 19 (L) 22 - 29 mmol/L    Anion Gap 15 7 - 15 mmol/L    Urea Nitrogen 5.4 (L) 6.0 - 20.0 mg/dL    Creatinine 0.55 0.51 - 0.95 mg/dL    Calcium 9.5 8.6 - 10.0 mg/dL    Glucose 300 (H) 70 - 99 mg/dL    GFR Estimate >90 >60 mL/min/1.73m2   Troponin T, High Sensitivity   Result Value Ref Range    Troponin T, High Sensitivity <6 <=14 ng/L   CBC with platelets and differential   Result Value Ref Range    WBC Count 15.1 (H) 4.0 - 11.0 10e3/uL    RBC Count 4.67 3.80 - 5.20 10e6/uL    Hemoglobin 14.1 11.7 - 15.7 g/dL    Hematocrit 42.4 35.0 - 47.0 %    MCV 91 78 - 100 fL    MCH 30.2 26.5 - 33.0 pg    MCHC 33.3 31.5 - 36.5 g/dL    RDW 13.9 10.0 - 15.0 %    Platelet Count 340 150 - 450 10e3/uL    % Neutrophils 76 %    % Lymphocytes 16 %    % Monocytes 6 %    % Eosinophils 1 %    % Basophils 0 %    % Immature Granulocytes 1 %    NRBCs per 100 WBC 0 <1 /100    Absolute Neutrophils 11.6 (H) 1.6 - 8.3 10e3/uL    Absolute Lymphocytes 2.4 0.8 - 5.3 10e3/uL    Absolute Monocytes 0.8 0.0 - 1.3 10e3/uL    Absolute Eosinophils 0.1 0.0 - 0.7 10e3/uL    Absolute Basophils 0.1 0.0 - 0.2 10e3/uL    Absolute Immature Granulocytes 0.1 <=0.4 10e3/uL    Absolute NRBCs 0.0 10e3/uL   Extra Tube    Narrative    The following orders were created for panel order Extra Tube.  Procedure                               Abnormality         Status                     ---------                               -----------         ------                     Extra Green Top (Lithium...[[id number]]                      Final result                 Please view results for these tests on the individual orders.   Extra Green Top (Lithium Heparin) Tube   Result Value Ref Range    Hold  Specimen JI    D dimer quantitative   Result Value Ref Range    D-Dimer Quantitative 0.76 (H) 0.00 - 0.50 ug/mL FEU    Narrative    This D-dimer assay is intended for use in conjunction with a clinical pretest probability assessment model to exclude pulmonary embolism (PE) and deep venous thrombosis (DVT) in outpatients suspected of PE or DVT. The cut-off value is 0.50 ug/mL FEU.   Extra Tube    Narrative    The following orders were created for panel order Extra Tube.  Procedure                               Abnormality         Status                     ---------                               -----------         ------                     Extra Red Top Tube[[id number]]                               Final result                 Please view results for these tests on the individual orders.   Extra Red Top Tube   Result Value Ref Range    Hold Specimen [facility]    CT Chest Pulmonary Embolism w Contrast    Narrative    CT CHEST PULMONARY EMBOLISM WITH CONTRAST May 19, 2023 9:06 AM    HISTORY: Sharp pleuritic type chest pain with elevated D-dimer.    TECHNIQUE: Scans obtained from the apices through the diaphragm with  IV contrast. ISOVUE-370, 70mL IV injected. Radiation dose for this  scan was reduced using automated exposure control, adjustment of the  mA and/or kV according to patient size, or iterative reconstruction  technique. 2D and 3D MIP reconstructions were performed by the CT  technologist    COMPARISON: Chest x-ray on 2/21/2017.    FINDINGS:  Chest/mediastinum: No evidence of pulmonary embolism. No cardiomegaly  or significant pericardial effusion. No significant mediastinal or  hilar lymphadenopathy. No significant atherosclerotic vascular  calcification of the coronary arteries.     Lungs and pleura: No pleural effusion or pneumothorax. Mild basilar  pulmonary opacities, likely atelectasis. Few scattered calcified  pulmonary granulomas.    Upper abdomen: Limited evaluation of the upper abdomen due to lack  of  coverage. Large amount of free peritoneal air, likely related to bowel  perforation.    Bones and soft tissue: No suspicious osseous lesion.      Impression    IMPRESSION:   1. No evidence of pulmonary embolism.  2. Large amount of free peritoneal air, likely related to bowel  perforation.    Findings were discussed with the patient's provider Dr. NELL MEDINA at 9:20 AM on 5/19/2023.    LIZZ GASCA MD         SYSTEM ID:  [id number]     All imaging studies reviewed by me.     Jesse Blackman MD, FACS

## 2023-05-19 NOTE — CONSULTS
[facility]  Consult Note - Hospitalist Service  Date of Admission:  5/19/2023  Consult Requested by: Dr. Blackman  Reason for Consult: Perioperative management of chronic medical problems including type 1 diabetes mellitus    Assessment & Plan   Heidi Su is a 37 year old female with type 1 diabetes mellitus normally treated with insulin pump who presented with abdominal pain and was taken emergently to the operating room today due to concern for bowel perforation with free intraperitoneal air and surgical abdomen.  She is now admitted on 5/19/2023 to the ICU postoperatively.  Hospitalist consultation has been requested by general surgery to assist with managing type 1 diabetes perioperatively.    Type 1 diabetes mellitus treated chronically with insulin pump  Metabolic acidosis with normal anion gap  Most recent hemoglobin A1c 8.5.  Insulin pump was discontinued preoperatively today.  Intraoperative blood sugars were well controlled.  She is anticipated to be n.p.o. for several days postoperatively and is at risk for DKA.  Prior to surgery she did present with mild metabolic acidosis with normal anion gap and blood sugar 300, but DKA was not suspected preoperatively.  -Patient is not currently capable of managing her own insulin pump postoperatively due to her medical state and ongoing requirement for potentially psychoactive medications including IV narcotics but would reassess her capacity to manage the insulin pump later in hospitalization as she recovers postoperatively  -For now, recommend continuous insulin infusion in ICU per diabetes insulin infusion protocol with close blood sugar monitoring while n.p.o.  -Ordered laboratory studies including electrolytes, serum ketones, and VBG to assess for presence of DKA and depending upon results of these initial studies would recommend continuing to monitor these lab parameters    Suspected perforated bowel with free  intraperitoneal air and surgical abdomen, status post exploratory laparotomy 5/19  SIRS  Surgeon reported intraoperative findings suspicious for microperforation of ileum of small bowel that was treated surgically intraoperatively.  Patient presented with tachycardia, tachypnea, and leukocytosis concerning for SIRS.  So far, sepsis has not been suspected but she is at risk for worsening infection and sepsis.  -Surgeon advised postoperative antibiotic therapy with IV Zosyn  -Surgeon advised n.p.o. status with nasogastric decompression  -Other routine postoperative cares also deferred to surgeon    Right calf saphenous vein thrombus  Aspirin-induced platelet defect  Patient was diagnosed with thrombus in the superficial right calf saphenous vein on May 2 and has been taking aspirin therapy since then.  Aspirin causes platelet function defect that can interfere with blood clotting and cause bleeding.  So far, excessive bleeding has not been suspected.  -Agree with holding aspirin therapy while she is n.p.o.  -Recommend alternate therapy for VTE prophylaxis particularly while n.p.o., specific recommendations deferred to surgeon in this immediate postoperative time period    Depression with anxiety  ADHD  Outside records refer to previous diagnosis of chronic mental health problems including depression, anxiety, and ADHD for which she normally takes medication treatment.  She presently is n.p.o.  -Supportive care  -Add low-dose lorazepam as needed but avoid combining IV lorazepam and IV narcotics simultaneously       Clinically Significant Risk Factors Present on Admission                               Jcarlos Cho MD  Hospitalist Service  Securely message with [facility] (more info)  Text page via [facility] Paging/Directory   ______________________________________________________________________    Chief Complaint   Chest pain    History is obtained from the patient, electronic health record and surgeon   Yehuda    History of Present Illness   Heidi Su is a 37 year old female who was apparently in her usual health until early this morning when she awoke with sudden pain in her lower mid chest versus upper abdomen.  Patient currently appears to be angry and tearful and has difficulty providing history.  Because of this new pain this morning, she presented to the emergency room for evaluation and was found to have free intraperitoneal air.  General surgeon was consulted and expressed concern for surgical abdomen and recommended emergency surgery.  She underwent exploratory laparotomy today under general anesthesia.  Surgeon reports that her insulin pump was removed preoperatively.  General surgeon reports that there was suspicion for serosal tear with microperforation of the distal small bowel ileum based on intraoperative findings including exudate in the region of the distal ileum.  General surgeon reports that the affected area of the ileum was oversewn and incidental appendectomy was performed.  EBL was less than 10 mL.  After immediate postoperative recovery, patient was admitted to the hospital for postoperative care.  Nasogastric tube has been placed perioperatively.  Patient is now seen in her hospital room for evaluation.  She says she is irritated by the nasogastric tube both by its physical presence and by the sound it makes.      Past Medical History    Past Medical History:   Diagnosis Date     Anxiety      Bronchitis      Depressive disorder      Diabetes (H)      NO ACTIVE PROBLEMS      Ovarian cyst      Outside medical records refer to diagnosis of type 1 diabetes managed with insulin pump.    Past Surgical History   Past Surgical History:   Procedure Laterality Date     CYSTECTOMY OVARIAN BENIGN       DILATION AND CURETTAGE       DILATION AND CURETTAGE  2011     GYN SURGERY       HEMORRHOID SURGERY       Patient says she underwent laparoscopic vaginal hysterectomy with bilateral oophorectomy  due to ovarian cysts which she says were benign in November 2022    Medications   I have reviewed this patient's current medications  Medications Prior to Admission   Medication Sig Dispense Refill Last Dose     acetaminophen (TYLENOL) 500 MG tablet Take 500 mg by mouth        aspirin (ASA) 325 MG EC tablet Take 325 mg by mouth 2 times daily Blood clots  5/23        ondansetron (ZOFRAN ODT) 4 MG ODT tab Place 4 mg under the tongue        acetaminophen (TYLENOL) 500 MG tablet Take 1-2 tablets by mouth every 6 hours as needed.        albuterol (2.5 MG/3ML) 0.083% nebulizer solution Take 3 mLs by nebulization every 4 hours as needed for shortness of breath / dyspnea (coughing jags). 1 Box 0      albuterol (ALBUTEROL) 108 (90 BASE) MCG/ACT Inhaler Inhale 2 puffs into the lungs every 4 hours as needed for shortness of breath / dyspnea 1 Inhaler 0      amoxicillin (AMOXIL) 500 MG capsule Take 1 capsule (500 mg) by mouth 2 times daily 30 capsule 0      amphetamine-dextroamphetamine (ADDERALL) 10 MG tablet Take 10 mg by mouth daily as needed At 3pm        amphetamine-dextroamphetamine (ADDERALL) 15 MG tablet Take 15 mg by mouth daily At noon        blood glucose (CONTOUR NEXT TEST) test strip TEST 5- 6 TIMES/DAY, uncontrolled diabetes        cetirizine (ZYRTEC) 10 MG tablet Take 10 mg by mouth        clonazePAM (KLONOPIN) 0.5 MG tablet Take 0.5 mg by mouth daily as needed for anxiety        Continuous Blood Gluc Sensor (DEXCOM G6 SENSOR) MISC USE AS DIRECTED TO CHECK BLOOD SUGARS. CHANGE EVERY 10 DAYS        Continuous Blood Gluc Transmit (DEXCOM G6 TRANSMITTER) MISC         estradiol (CLIMARA) 0.05 MG/24HR weekly patch         etonogestrel (NEXPLANON) 68 MG IMPL Inject 1 each Subcutaneous        famotidine (PEPCID) 20 MG tablet Take 20 mg by mouth 2 times daily        hydrOXYzine (ATARAX) 50 MG tablet Take  mg by mouth nightly as needed for anxiety or sleep        ibuprofen (ADVIL,MOTRIN) 200 MG tablet Take 4 tablets  (800 mg) by mouth every 8 hours as needed for pain (with food) 120 tablet       ibuprofen (ADVIL/MOTRIN) 600 MG tablet Take 600 mg by mouth as needed        insulin aspart (NOVOLOG VIAL) 100 UNITS/ML vial Inject 60 Units Subcutaneous daily        Insulin Glargine-yfgn 100 UNIT/ML SOPN inject 24 units subq every 24 hours in case of pump failure        KETOSTIX test strip FOR PERSONAL USE IF DKA IS SUSPECTED.        lisdexamfetamine (VYVANSE) 40 MG capsule Take 40 mg by mouth every morning        meloxicam (MOBIC) 15 MG tablet Take 1 tablet (15 mg) by mouth daily for 14 doses Take with food to prevent stomach irritation. 14 tablet 0      methocarbamol (ROBAXIN) 750 MG tablet Take 1 tablet (750 mg) by mouth 4 times daily as needed for muscle spasms 15 tablet 0      predniSONE (DELTASONE) 20 MG tablet 1 tab twice daily x 5 days, then 1 tab once daily x 5 days 15 tablet 0      QUEtiapine (SEROQUEL) 25 MG tablet Take 25 mg by mouth        QUICKVUE AT-HOME COVID-19 TEST KIT test as directed           Review of Systems    The 10 point Review of Systems is negative other than noted in the HPI or here.     Allergies   Allergies   Allergen Reactions     No Known Allergies         Physical Exam   Vital Signs: Temp: 99.5  F (37.5  C) Temp src: Core BP: 105/60 Pulse: 92   Resp: 15 SpO2: 94 % O2 Device: None (Room air)     Patient Vitals for the past 24 hrs:   BP Temp Temp src Pulse Resp SpO2   05/19/23 1630 112/71 -- -- 90 17 --   05/19/23 1555 -- -- -- 92 15 94 %   05/19/23 1550 -- 99.5  F (37.5  C) Core 92 15 96 %   05/19/23 1545 105/60 100  F (37.8  C) -- 94 14 99 %   05/19/23 1544 105/60 99.9  F (37.7  C) -- 91 14 97 %   05/19/23 1530 105/67 99.7  F (37.6  C) -- 96 20 97 %   05/19/23 1525 -- 99.7  F (37.6  C) -- 93 16 98 %   05/19/23 1520 106/65 99.9  F (37.7  C) -- 92 16 99 %   05/19/23 1515 107/67 99.9  F (37.7  C) -- 94 17 99 %   05/19/23 1510 109/65 99.7  F (37.6  C) -- 99 18 98 %   05/19/23 1507 107/53 99.5  F (37.5  C)  -- 117 27 93 %   05/19/23 1505 -- 99.9  F (37.7  C) -- 95 17 99 %   05/19/23 1500 107/53 99.7  F (37.6  C) -- 95 16 98 %   05/19/23 1455 122/66 99.5  F (37.5  C) -- 109 27 93 %   05/19/23 1445 119/75 99  F (37.2  C) -- 110 15 100 %   05/19/23 1440 113/68 99  F (37.2  C) -- 95 15 99 %   05/19/23 1435 114/66 99.1  F (37.3  C) -- 98 15 100 %   05/19/23 1431 (!) 129/105 98.2  F (36.8  C) -- 101 20 98 %   05/19/23 1430 (!) 129/105 98.6  F (37  C) -- 99 28 100 %   05/19/23 1425 118/79 97.7  F (36.5  C) -- 97 14 100 %   05/19/23 1149 (!) 124/90 100.2  F (37.9  C) Temporal 98 20 99 %   05/19/23 1032 -- -- -- -- -- 99 %   05/19/23 1030 (!) 142/88 -- -- 100 -- --   05/19/23 1025 (!) 147/88 98.3  F (36.8  C) Oral -- 20 100 %   05/19/23 1007 -- -- -- 110 20 100 %   05/19/23 1006 -- -- -- 106 19 98 %   05/19/23 1001 -- -- -- 109 12 98 %   05/19/23 0930 129/86 -- -- 96 20 96 %   05/19/23 0915 123/88 -- -- 92 15 96 %   05/19/23 0845 126/89 -- -- 97 21 97 %   05/19/23 0830 128/89 -- -- 104 27 97 %   05/19/23 0815 129/86 -- -- 106 22 98 %   05/19/23 0800 129/84 -- -- 120 21 98 %   05/19/23 0745 (!) 140/94 -- -- 109 -- 97 %   05/19/23 0730 127/84 -- -- 103 25 100 %   05/19/23 0715 125/84 -- -- 105 21 100 %   05/19/23 0651 (!) 158/136 97.5  F (36.4  C) Oral (!) 133 24 100 %       Intake/Output Summary (Last 24 hours) at 5/19/2023 1650  Last data filed at 5/19/2023 1520  Gross per 24 hour   Intake 1602 ml   Output 360 ml   Net 1242 ml     Constitutional: Initially angry appearing woman who also appears uncomfortable while lying in bed  Eyes: Anicteric sclerae, clear conjunctivae  ENT: No overt signs of recent head injury, nasogastric tube present in the naris, lips appear normal  Neck: Trachea midline, no stridor, symmetric  Hematologic / Lymphatic: no cervical lymphadenopathy and no supraclavicular lymphadenopathy  Chest: No gross anomalies, symmetric excursion  Respiratory: Normal respiratory effort, clear lung  fields  Cardiovascular: Regular rate and rhythm, good radial pulse, normal capillary refill  GI: Bowel sounds absent  Skin: Normal color, no rash  Musculoskeletal: No gross limb anomalies  Neurologic: Initially alert and responsive but becomes less responsive after dose of IV Dilaudid administered by bedside nurse during the examination  Neuropsychiatric: Initially appeared angry and was tearful, becomes less responsive after dose of IV Dilaudid    Medical Decision Making     83 MINUTES SPENT BY ME on the date of service doing chart review, history, exam, documentation & further activities per the note.  MANAGEMENT DISCUSSED with the following over the past 24 hours: Discussed hospital admission postoperatively and recommendation for continuous insulin infusion with surgeon   Tests REVIEWED in the past 24 hours:  - Cardiac monitor demonstrates normal sinus rhythm      Data     I have personally reviewed the following data over the past 24 hrs:    15.1 (H)  \   14.1   / 340     134 (L) 100 5.4 (L) /  149 (H)   3.4 19 (L) 0.55 \       Trop: <6 BNP: N/A       INR:  N/A PTT:  N/A   D-dimer:  0.76 (H) Fibrinogen:  N/A          Imaging results reviewed over the past 24 hrs:   Recent Results (from the past 24 hour(s))   CT Chest Pulmonary Embolism w Contrast    Narrative    CT CHEST PULMONARY EMBOLISM WITH CONTRAST May 19, 2023 9:06 AM    HISTORY: Sharp pleuritic type chest pain with elevated D-dimer.    TECHNIQUE: Scans obtained from the apices through the diaphragm with  IV contrast. ISOVUE-370, 70mL IV injected. Radiation dose for this  scan was reduced using automated exposure control, adjustment of the  mA and/or kV according to patient size, or iterative reconstruction  technique. 2D and 3D MIP reconstructions were performed by the CT  technologist    COMPARISON: Chest x-ray on 2/21/2017.    FINDINGS:  Chest/mediastinum: No evidence of pulmonary embolism. No cardiomegaly  or significant pericardial effusion. No  significant mediastinal or  hilar lymphadenopathy. No significant atherosclerotic vascular  calcification of the coronary arteries.     Lungs and pleura: No pleural effusion or pneumothorax. Mild basilar  pulmonary opacities, likely atelectasis. Few scattered calcified  pulmonary granulomas.    Upper abdomen: Limited evaluation of the upper abdomen due to lack of  coverage. Large amount of free peritoneal air, likely related to bowel  perforation.    Bones and soft tissue: No suspicious osseous lesion.      Impression    IMPRESSION:   1. No evidence of pulmonary embolism.  2. Large amount of free peritoneal air, likely related to bowel  perforation.    Findings were discussed with the patient's provider Dr. NELL MEDINA at 9:20 AM on 5/19/2023.    LIZZ GASCA MD         SYSTEM ID:  [id number]   CT Abdomen Pelvis w Contrast    Narrative    CT ABDOMEN AND PELVIS WITH CONTRAST  5/19/2023 10:19 AM    HISTORY: Presenting with chest pain and upper abdominal pain.  Abdominal pain intensifying. CT chest identified free air in abdomen.    TECHNIQUE: CT scan obtained of the abdomen, and pelvis with IV  contrast. ISOVUE-370, 80 mL IV injected. Radiation dose for this scan  was reduced using automated exposure control, adjustment of the mA  and/or kV according to patient size, or iterative reconstruction  technique.    COMPARISON: CT abdomen and pelvis on 3/3/2021 and chest CT on same day  earlier.    FINDINGS:    Lower chest: Please refer to concurrently performed chest CT for  findings related to the lung bases.    Abdomen/pelvis:    Hepatobiliary: No suspicious focal hepatic lesion. The gallbladder is  unremarkable.    Pancreas: No main pancreatic ductal dilatation or definite solid  pancreatic mass.    Spleen: No splenomegaly.    Adrenal glands: No adrenal nodules.    Kidneys: Contrast within the renal collecting system, which precludes  evaluation for kidney stones.    Bowel: No abnormally dilated bowel  loops. The appendix is visualized  and appears normal.    Peritoneum: Large amount of free peritoneal air, likely related to  perforated viscus, however, no definite source identified.    Pelvic organs: The uterus is not visualized, likely surgically absent.    Vascular: Unremarkable.    Lymph nodes: No significant abdominopelvic lymphadenopathy.    Bones and soft tissue: No suspicious osseous lesion.      Impression    IMPRESSION:   1. Large amount of free peritoneal air, likely related to perforated  viscus. No definite source identified, although there is significant  thinning of the anterior wall of the gastric body, which could  represent a potential source.    LIZZ GASCA MD         SYSTEM ID:  [id number]     Recent Labs   Lab 05/19/23  1458 05/19/23  1255 05/19/23  1154 05/19/23  0708   WBC  --   --   --  15.1*   HGB  --   --   --  14.1   MCV  --   --   --  91   PLT  --   --   --  340   NA  --   --   --  134*   POTASSIUM  --   --   --  3.4   CHLORIDE  --   --   --  100   CO2  --   --   --  19*   BUN  --   --   --  5.4*   CR  --   --   --  0.55   ANIONGAP  --   --   --  15   NADIR  --   --   --  9.5   * 100* 104* 300*

## 2023-05-20 PROBLEM — E87.6 HYPOKALEMIA: Status: ACTIVE | Noted: 2023-05-20

## 2023-05-20 PROBLEM — D64.9 ANEMIA, UNSPECIFIED TYPE: Status: ACTIVE | Noted: 2023-05-20

## 2023-05-20 PROBLEM — E10.10 DIABETIC KETOACIDOSIS WITHOUT COMA ASSOCIATED WITH TYPE 1 DIABETES MELLITUS (H): Status: ACTIVE | Noted: 2023-05-19

## 2023-05-20 LAB
ANION GAP SERPL CALCULATED.3IONS-SCNC: 11 MMOL/L (ref 7–15)
ANION GAP SERPL CALCULATED.3IONS-SCNC: 8 MMOL/L (ref 7–15)
ANION GAP SERPL CALCULATED.3IONS-SCNC: 9 MMOL/L (ref 7–15)
B-OH-BUTYR SERPL-SCNC: 0.71 MMOL/L
B-OH-BUTYR SERPL-SCNC: <0.18 MMOL/L
BASE EXCESS BLDV CALC-SCNC: -2.3 MMOL/L (ref -7.7–1.9)
BASE EXCESS BLDV CALC-SCNC: -2.4 MMOL/L (ref -7.7–1.9)
BASE EXCESS BLDV CALC-SCNC: -2.5 MMOL/L (ref -7.7–1.9)
BASE EXCESS BLDV CALC-SCNC: -2.9 MMOL/L (ref -7.7–1.9)
BASE EXCESS BLDV CALC-SCNC: -3.5 MMOL/L (ref -7.7–1.9)
BUN SERPL-MCNC: 6.5 MG/DL (ref 6–20)
CALCIUM SERPL-MCNC: 7.7 MG/DL (ref 8.6–10)
CHLORIDE SERPL-SCNC: 107 MMOL/L (ref 98–107)
CHLORIDE SERPL-SCNC: 107 MMOL/L (ref 98–107)
CHLORIDE SERPL-SCNC: 110 MMOL/L (ref 98–107)
CREAT SERPL-MCNC: 0.61 MG/DL (ref 0.51–0.95)
DEPRECATED HCO3 PLAS-SCNC: 19 MMOL/L (ref 22–29)
DEPRECATED HCO3 PLAS-SCNC: 20 MMOL/L (ref 22–29)
DEPRECATED HCO3 PLAS-SCNC: 21 MMOL/L (ref 22–29)
ERYTHROCYTE [DISTWIDTH] IN BLOOD BY AUTOMATED COUNT: 14.4 % (ref 10–15)
GFR SERPL CREATININE-BSD FRML MDRD: >90 ML/MIN/1.73M2
GLUCOSE BLDC GLUCOMTR-MCNC: 117 MG/DL (ref 70–99)
GLUCOSE BLDC GLUCOMTR-MCNC: 118 MG/DL (ref 70–99)
GLUCOSE BLDC GLUCOMTR-MCNC: 119 MG/DL (ref 70–99)
GLUCOSE BLDC GLUCOMTR-MCNC: 120 MG/DL (ref 70–99)
GLUCOSE BLDC GLUCOMTR-MCNC: 122 MG/DL (ref 70–99)
GLUCOSE BLDC GLUCOMTR-MCNC: 122 MG/DL (ref 70–99)
GLUCOSE BLDC GLUCOMTR-MCNC: 123 MG/DL (ref 70–99)
GLUCOSE BLDC GLUCOMTR-MCNC: 123 MG/DL (ref 70–99)
GLUCOSE BLDC GLUCOMTR-MCNC: 128 MG/DL (ref 70–99)
GLUCOSE BLDC GLUCOMTR-MCNC: 136 MG/DL (ref 70–99)
GLUCOSE BLDC GLUCOMTR-MCNC: 138 MG/DL (ref 70–99)
GLUCOSE BLDC GLUCOMTR-MCNC: 142 MG/DL (ref 70–99)
GLUCOSE BLDC GLUCOMTR-MCNC: 145 MG/DL (ref 70–99)
GLUCOSE BLDC GLUCOMTR-MCNC: 152 MG/DL (ref 70–99)
GLUCOSE BLDC GLUCOMTR-MCNC: 154 MG/DL (ref 70–99)
GLUCOSE BLDC GLUCOMTR-MCNC: 155 MG/DL (ref 70–99)
GLUCOSE BLDC GLUCOMTR-MCNC: 157 MG/DL (ref 70–99)
GLUCOSE BLDC GLUCOMTR-MCNC: 186 MG/DL (ref 70–99)
GLUCOSE BLDC GLUCOMTR-MCNC: 186 MG/DL (ref 70–99)
GLUCOSE BLDC GLUCOMTR-MCNC: 192 MG/DL (ref 70–99)
GLUCOSE BLDC GLUCOMTR-MCNC: 209 MG/DL (ref 70–99)
GLUCOSE BLDC GLUCOMTR-MCNC: 95 MG/DL (ref 70–99)
GLUCOSE SERPL-MCNC: 136 MG/DL (ref 70–99)
HCO3 BLDV-SCNC: 20 MMOL/L (ref 21–28)
HCO3 BLDV-SCNC: 22 MMOL/L (ref 21–28)
HCO3 BLDV-SCNC: 23 MMOL/L (ref 21–28)
HCO3 BLDV-SCNC: 24 MMOL/L (ref 21–28)
HCO3 BLDV-SCNC: 24 MMOL/L (ref 21–28)
HCT VFR BLD AUTO: 31 % (ref 35–47)
HGB BLD-MCNC: 10.1 G/DL (ref 11.7–15.7)
HGB BLD-MCNC: 10.2 G/DL (ref 11.7–15.7)
HGB BLD-MCNC: 9.9 G/DL (ref 11.7–15.7)
MCH RBC QN AUTO: 30 PG (ref 26.5–33)
MCHC RBC AUTO-ENTMCNC: 32.9 G/DL (ref 31.5–36.5)
MCV RBC AUTO: 91 FL (ref 78–100)
O2/TOTAL GAS SETTING VFR VENT: 21 %
O2/TOTAL GAS SETTING VFR VENT: 28 %
PCO2 BLDV: 32 MM HG (ref 40–50)
PCO2 BLDV: 36 MM HG (ref 40–50)
PCO2 BLDV: 39 MM HG (ref 40–50)
PCO2 BLDV: 44 MM HG (ref 40–50)
PCO2 BLDV: 46 MM HG (ref 40–50)
PH BLDV: 7.32 [PH] (ref 7.32–7.43)
PH BLDV: 7.33 [PH] (ref 7.32–7.43)
PH BLDV: 7.37 [PH] (ref 7.32–7.43)
PH BLDV: 7.38 [PH] (ref 7.32–7.43)
PH BLDV: 7.41 [PH] (ref 7.32–7.43)
PHOSPHATE SERPL-MCNC: 1.9 MG/DL (ref 2.5–4.5)
PHOSPHATE SERPL-MCNC: 2.5 MG/DL (ref 2.5–4.5)
PLATELET # BLD AUTO: 247 10E3/UL (ref 150–450)
PO2 BLDV: 28 MM HG (ref 25–47)
PO2 BLDV: 44 MM HG (ref 25–47)
PO2 BLDV: 55 MM HG (ref 25–47)
PO2 BLDV: 70 MM HG (ref 25–47)
PO2 BLDV: 91 MM HG (ref 25–47)
POTASSIUM SERPL-SCNC: 3.2 MMOL/L (ref 3.4–5.3)
POTASSIUM SERPL-SCNC: 3.3 MMOL/L (ref 3.4–5.3)
POTASSIUM SERPL-SCNC: 3.5 MMOL/L (ref 3.4–5.3)
POTASSIUM SERPL-SCNC: 3.7 MMOL/L (ref 3.4–5.3)
RBC # BLD AUTO: 3.4 10E6/UL (ref 3.8–5.2)
SODIUM SERPL-SCNC: 136 MMOL/L (ref 136–145)
SODIUM SERPL-SCNC: 137 MMOL/L (ref 136–145)
SODIUM SERPL-SCNC: 139 MMOL/L (ref 136–145)
WBC # BLD AUTO: 8.8 10E3/UL (ref 4–11)

## 2023-05-20 PROCEDURE — 82010 KETONE BODYS QUAN: CPT | Performed by: PEDIATRICS

## 2023-05-20 PROCEDURE — 84100 ASSAY OF PHOSPHORUS: CPT | Performed by: PEDIATRICS

## 2023-05-20 PROCEDURE — 250N000011 HC RX IP 250 OP 636: Performed by: PEDIATRICS

## 2023-05-20 PROCEDURE — 82803 BLOOD GASES ANY COMBINATION: CPT | Performed by: PEDIATRICS

## 2023-05-20 PROCEDURE — 36415 COLL VENOUS BLD VENIPUNCTURE: CPT | Performed by: PEDIATRICS

## 2023-05-20 PROCEDURE — 258N000003 HC RX IP 258 OP 636: Performed by: PEDIATRICS

## 2023-05-20 PROCEDURE — 80051 ELECTROLYTE PANEL: CPT | Performed by: PEDIATRICS

## 2023-05-20 PROCEDURE — 250N000013 HC RX MED GY IP 250 OP 250 PS 637: Performed by: PEDIATRICS

## 2023-05-20 PROCEDURE — 258N000003 HC RX IP 258 OP 636: Performed by: SPECIALIST

## 2023-05-20 PROCEDURE — 250N000011 HC RX IP 250 OP 636: Performed by: SURGERY

## 2023-05-20 PROCEDURE — 250N000013 HC RX MED GY IP 250 OP 250 PS 637: Performed by: SURGERY

## 2023-05-20 PROCEDURE — 250N000012 HC RX MED GY IP 250 OP 636 PS 637: Performed by: PEDIATRICS

## 2023-05-20 PROCEDURE — 250N000009 HC RX 250: Performed by: SPECIALIST

## 2023-05-20 PROCEDURE — 200N000001 HC R&B ICU

## 2023-05-20 PROCEDURE — 84132 ASSAY OF SERUM POTASSIUM: CPT | Performed by: SPECIALIST

## 2023-05-20 PROCEDURE — 85018 HEMOGLOBIN: CPT | Performed by: PEDIATRICS

## 2023-05-20 PROCEDURE — 250N000013 HC RX MED GY IP 250 OP 250 PS 637: Performed by: SPECIALIST

## 2023-05-20 PROCEDURE — 82310 ASSAY OF CALCIUM: CPT | Performed by: PEDIATRICS

## 2023-05-20 PROCEDURE — C9113 INJ PANTOPRAZOLE SODIUM, VIA: HCPCS | Performed by: PEDIATRICS

## 2023-05-20 PROCEDURE — 84132 ASSAY OF SERUM POTASSIUM: CPT | Performed by: PEDIATRICS

## 2023-05-20 PROCEDURE — 85027 COMPLETE CBC AUTOMATED: CPT | Performed by: PEDIATRICS

## 2023-05-20 PROCEDURE — 99232 SBSQ HOSP IP/OBS MODERATE 35: CPT | Performed by: PEDIATRICS

## 2023-05-20 RX ORDER — OXYCODONE HYDROCHLORIDE 5 MG/1
10 TABLET ORAL EVERY 4 HOURS PRN
Status: DISCONTINUED | OUTPATIENT
Start: 2023-05-20 | End: 2023-05-22 | Stop reason: HOSPADM

## 2023-05-20 RX ORDER — KETOROLAC TROMETHAMINE 30 MG/ML
30 INJECTION, SOLUTION INTRAMUSCULAR; INTRAVENOUS EVERY 6 HOURS PRN
Status: DISCONTINUED | OUTPATIENT
Start: 2023-05-20 | End: 2023-05-22 | Stop reason: HOSPADM

## 2023-05-20 RX ORDER — OXYCODONE HYDROCHLORIDE 5 MG/1
5 TABLET ORAL EVERY 4 HOURS PRN
Status: DISCONTINUED | OUTPATIENT
Start: 2023-05-20 | End: 2023-05-22 | Stop reason: HOSPADM

## 2023-05-20 RX ORDER — POTASSIUM CHLORIDE 1500 MG/1
40 TABLET, EXTENDED RELEASE ORAL ONCE
Status: COMPLETED | OUTPATIENT
Start: 2023-05-20 | End: 2023-05-20

## 2023-05-20 RX ADMIN — SODIUM PHOSPHATE, MONOBASIC, MONOHYDRATE AND SODIUM PHOSPHATE, DIBASIC, ANHYDROUS 15 MMOL: 142; 276 INJECTION, SOLUTION INTRAVENOUS at 20:51

## 2023-05-20 RX ADMIN — OXYCODONE HYDROCHLORIDE 5 MG: 5 TABLET ORAL at 23:36

## 2023-05-20 RX ADMIN — ACETAMINOPHEN 975 MG: 325 TABLET ORAL at 12:20

## 2023-05-20 RX ADMIN — OXYCODONE HYDROCHLORIDE 5 MG: 5 TABLET ORAL at 16:17

## 2023-05-20 RX ADMIN — HYDROXYZINE HYDROCHLORIDE 25 MG: 25 TABLET, FILM COATED ORAL at 12:19

## 2023-05-20 RX ADMIN — HYDROMORPHONE HYDROCHLORIDE 0.2 MG: 0.2 INJECTION, SOLUTION INTRAMUSCULAR; INTRAVENOUS; SUBCUTANEOUS at 11:04

## 2023-05-20 RX ADMIN — POTASSIUM CHLORIDE, DEXTROSE MONOHYDRATE AND SODIUM CHLORIDE: 150; 5; 450 INJECTION, SOLUTION INTRAVENOUS at 04:20

## 2023-05-20 RX ADMIN — HYDROMORPHONE HYDROCHLORIDE 0.2 MG: 0.2 INJECTION, SOLUTION INTRAMUSCULAR; INTRAVENOUS; SUBCUTANEOUS at 13:07

## 2023-05-20 RX ADMIN — Medication 1 PATCH: at 08:42

## 2023-05-20 RX ADMIN — KETOROLAC TROMETHAMINE 30 MG: 30 INJECTION, SOLUTION INTRAMUSCULAR at 14:17

## 2023-05-20 RX ADMIN — PIPERACILLIN AND TAZOBACTAM 3.38 G: 3; .375 INJECTION, POWDER, FOR SOLUTION INTRAVENOUS at 23:03

## 2023-05-20 RX ADMIN — KETOROLAC TROMETHAMINE 30 MG: 30 INJECTION, SOLUTION INTRAMUSCULAR at 19:38

## 2023-05-20 RX ADMIN — POTASSIUM CHLORIDE 40 MEQ: 1500 TABLET, EXTENDED RELEASE ORAL at 04:20

## 2023-05-20 RX ADMIN — HYDROMORPHONE HYDROCHLORIDE 0.2 MG: 0.2 INJECTION, SOLUTION INTRAMUSCULAR; INTRAVENOUS; SUBCUTANEOUS at 04:37

## 2023-05-20 RX ADMIN — HYDROMORPHONE HYDROCHLORIDE 0.2 MG: 0.2 INJECTION, SOLUTION INTRAMUSCULAR; INTRAVENOUS; SUBCUTANEOUS at 01:15

## 2023-05-20 RX ADMIN — PIPERACILLIN AND TAZOBACTAM 3.38 G: 3; .375 INJECTION, POWDER, FOR SOLUTION INTRAVENOUS at 17:31

## 2023-05-20 RX ADMIN — PIPERACILLIN AND TAZOBACTAM 3.38 G: 3; .375 INJECTION, POWDER, FOR SOLUTION INTRAVENOUS at 04:20

## 2023-05-20 RX ADMIN — OXYCODONE HYDROCHLORIDE 5 MG: 5 TABLET ORAL at 19:38

## 2023-05-20 RX ADMIN — SENNOSIDES AND DOCUSATE SODIUM 1 TABLET: 8.6; 5 TABLET ORAL at 08:33

## 2023-05-20 RX ADMIN — ACETAMINOPHEN 975 MG: 325 TABLET ORAL at 04:20

## 2023-05-20 RX ADMIN — SODIUM CHLORIDE 3 UNITS/HR: 9 INJECTION, SOLUTION INTRAVENOUS at 23:33

## 2023-05-20 RX ADMIN — PANTOPRAZOLE SODIUM 40 MG: 40 INJECTION, POWDER, FOR SOLUTION INTRAVENOUS at 08:26

## 2023-05-20 RX ADMIN — KETOROLAC TROMETHAMINE 30 MG: 30 INJECTION, SOLUTION INTRAMUSCULAR at 02:24

## 2023-05-20 RX ADMIN — SENNOSIDES AND DOCUSATE SODIUM 1 TABLET: 8.6; 5 TABLET ORAL at 20:51

## 2023-05-20 RX ADMIN — PANTOPRAZOLE SODIUM 40 MG: 40 INJECTION, POWDER, FOR SOLUTION INTRAVENOUS at 20:50

## 2023-05-20 RX ADMIN — POTASSIUM CHLORIDE, DEXTROSE MONOHYDRATE AND SODIUM CHLORIDE: 150; 5; 450 INJECTION, SOLUTION INTRAVENOUS at 14:15

## 2023-05-20 RX ADMIN — POLYETHYLENE GLYCOL 3350 17 G: 17 POWDER, FOR SOLUTION ORAL at 08:30

## 2023-05-20 RX ADMIN — KETOROLAC TROMETHAMINE 30 MG: 30 INJECTION, SOLUTION INTRAMUSCULAR at 08:30

## 2023-05-20 RX ADMIN — PIPERACILLIN AND TAZOBACTAM 3.38 G: 3; .375 INJECTION, POWDER, FOR SOLUTION INTRAVENOUS at 11:06

## 2023-05-20 RX ADMIN — ENOXAPARIN SODIUM 40 MG: 40 INJECTION SUBCUTANEOUS at 08:34

## 2023-05-20 RX ADMIN — ACETAMINOPHEN 975 MG: 325 TABLET ORAL at 20:50

## 2023-05-20 ASSESSMENT — ACTIVITIES OF DAILY LIVING (ADL)
ADLS_ACUITY_SCORE: 20

## 2023-05-20 NOTE — MEDICATION SCRIBE - ADMISSION MEDICATION HISTORY
Medication Scribe Admission Medication History    Admission medication history is complete. The information provided in this note is only as accurate as the sources available at the time of the update.    Medication reconciliation/reorder completed by provider prior to medication history? yes    Information Source(s): Patient via in-person    Pertinent Information: removed meds per patient request. Last doses were >48hrs ago so didn't press for that info as patient is in significant pain    Changes made to PTA medication list:    Added: None    Deleted: albuterol inh and nebs, zyrtec, Climara, ibuprofen 600mg, Mobic, Robaxin, Prednisone 20mg    Changed: None    Medication Affordability:  Not including over the counter (OTC) medications, was there a time in the past 3 months when you did not take your medications as prescribed because of cost?: Unable to Assess    Allergies reviewed with patient and updates made in EHR: unable to assess    Medication History Completed By: MERVIN JIMENEZ 5/20/2023 12:36 PM    Prior to Admission medications    Medication Sig Last Dose Taking? Auth Provider Long Term End Date   acetaminophen (TYLENOL) 500 MG tablet Take 500 mg by mouth  Yes Reported, Patient     aspirin (ASA) 325 MG EC tablet Take 325 mg by mouth 2 times daily Blood clots  5/23  Yes Reported, Patient     amphetamine-dextroamphetamine (ADDERALL) 15 MG tablet Take 15 mg by mouth daily At noon   Reported, Patient     blood glucose (CONTOUR NEXT TEST) test strip TEST 5- 6 TIMES/DAY, uncontrolled diabetes   Reported, Patient     Continuous Blood Gluc Sensor (DEXCOM G6 SENSOR) MISC USE AS DIRECTED TO CHECK BLOOD SUGARS. CHANGE EVERY 10 DAYS   Reported, Patient     Continuous Blood Gluc Transmit (DEXCOM G6 TRANSMITTER) MISC    Reported, Patient     famotidine (PEPCID) 20 MG tablet Take 20 mg by mouth 2 times daily   Reported, Patient     ibuprofen (ADVIL,MOTRIN) 200 MG tablet Take 4 tablets (800 mg) by mouth every 8 hours as  needed for pain (with food)   Patricio Campbell MD     insulin aspart (NOVOLOG VIAL) 100 UNITS/ML vial Inject 60 Units Subcutaneous daily   Reported, Patient Yes    Insulin Glargine-yfgn 100 UNIT/ML SOPN inject 24 units subq every 24 hours in case of pump failure   Reported, Patient     KETOSTIX test strip FOR PERSONAL USE IF DKA IS SUSPECTED.   Reported, Patient     QUEtiapine (SEROQUEL) 25 MG tablet Take 25 mg by mouth   Reported, Patient Yes

## 2023-05-20 NOTE — PROGRESS NOTES
"Patient complains of  Abdomen pain  not passing flatus  Pain mostly under control  Nasal gastric tube in  vitals  Patient Vitals for the past 24 hrs:   BP Temp Temp src Pulse Resp SpO2 Height Weight   05/20/23 0000 108/69 -- -- 110 19 -- -- --   05/19/23 2305 111/69 100.3  F (37.9  C) Oral 105 19 97 % -- --   05/19/23 2300 111/69 -- -- 102 17 -- -- --   05/19/23 2200 108/67 -- -- 98 18 -- -- --   05/19/23 2100 105/70 -- -- 95 18 98 % -- --   05/19/23 2000 103/76 -- -- 91 16 -- -- --   05/19/23 1949 -- 99.5  F (37.5  C) Oral 95 18 -- -- --   05/19/23 1946 -- -- -- -- -- 97 % -- --   05/19/23 1930 108/67 -- -- 92 16 -- -- --   05/19/23 1900 108/68 -- -- 93 15 -- -- --   05/19/23 1830 112/71 -- -- 93 14 -- -- --   05/19/23 1815 (!) 116/97 -- -- (!) 124 28 95 % -- --   05/19/23 1800 109/71 -- -- 93 17 -- -- --   05/19/23 1745 108/73 -- -- 93 16 -- -- --   05/19/23 1730 115/63 99.4  F (37.4  C) -- (!) 126 28 94 % -- --   05/19/23 1715 109/71 99.5  F (37.5  C) Oral 95 (!) 0 93 % -- --   05/19/23 1700 136/78 -- -- -- -- -- -- --   05/19/23 1645 118/72 99.6  F (37.6  C) Oral -- -- -- -- --   05/19/23 1630 112/71 -- -- 90 17 96 % 1.676 m (5' 6\") 72.8 kg (160 lb 7.9 oz)   05/19/23 1555 -- -- -- 92 15 94 % -- --   05/19/23 1550 -- 99.5  F (37.5  C) Core 92 15 96 % -- --   05/19/23 1545 105/60 100  F (37.8  C) -- 94 14 99 % -- --   05/19/23 1544 105/60 99.9  F (37.7  C) -- 91 14 97 % -- --   05/19/23 1530 105/67 99.7  F (37.6  C) -- 96 20 97 % -- --   05/19/23 1525 -- 99.7  F (37.6  C) -- 93 16 98 % -- --   05/19/23 1520 106/65 99.9  F (37.7  C) -- 92 16 99 % -- --   05/19/23 1515 107/67 99.9  F (37.7  C) -- 94 17 99 % -- --   05/19/23 1510 109/65 99.7  F (37.6  C) -- 99 18 98 % -- --   05/19/23 1507 107/53 99.5  F (37.5  C) -- 117 27 93 % -- --   05/19/23 1505 -- 99.9  F (37.7  C) -- 95 17 99 % -- --   05/19/23 1500 107/53 99.7  F (37.6  C) -- 95 16 98 % -- --   05/19/23 1455 122/66 99.5  F (37.5  C) -- 109 27 93 % -- -- " This patient has been identified as a low or moderate risk for unplanned readmission risk score for the BPCI-A program. The patient will be placed in paused status and monitored for 90 days. In the event of unplanned readmission, the patient will then be assigned to the BPCI-A nurse.        05/19/23 1445 119/75 99  F (37.2  C) -- 110 15 100 % -- --   05/19/23 1440 113/68 99  F (37.2  C) -- 95 15 99 % -- --   05/19/23 1435 114/66 99.1  F (37.3  C) -- 98 15 100 % -- --   05/19/23 1431 (!) 129/105 98.2  F (36.8  C) -- 101 20 98 % -- --   05/19/23 1430 (!) 129/105 98.6  F (37  C) -- 99 28 100 % -- --   05/19/23 1425 118/79 97.7  F (36.5  C) -- 97 14 100 % -- --   05/19/23 1149 (!) 124/90 100.2  F (37.9  C) Temporal 98 20 99 % -- --   05/19/23 1032 -- -- -- -- -- 99 % -- --   05/19/23 1030 (!) 142/88 -- -- 100 -- -- -- --   05/19/23 1025 (!) 147/88 98.3  F (36.8  C) Oral -- 20 100 % -- --   05/19/23 1007 -- -- -- 110 20 100 % -- --   05/19/23 1006 -- -- -- 106 19 98 % -- --   05/19/23 1001 -- -- -- 109 12 98 % -- --   05/19/23 0930 129/86 -- -- 96 20 96 % -- --   05/19/23 0915 123/88 -- -- 92 15 96 % -- --   05/19/23 0845 126/89 -- -- 97 21 97 % -- --   05/19/23 0830 128/89 -- -- 104 27 97 % -- --   05/19/23 0815 129/86 -- -- 106 22 98 % -- --   05/19/23 0800 129/84 -- -- 120 21 98 % -- --   05/19/23 0745 (!) 140/94 -- -- 109 -- 97 % -- --   05/19/23 0730 127/84 -- -- 103 25 100 % -- --   05/19/23 0715 125/84 -- -- 105 21 100 % -- --   05/19/23 0651 (!) 158/136 97.5  F (36.4  C) Oral (!) 133 24 100 % -- --     Hospital problem list  Patient Active Problem List   Diagnosis     CARDIOVASCULAR SCREENING; LDL GOAL LESS THAN 160     Surgical abdomen     Free intraperitoneal air     Type 1 diabetes mellitus with hyperglycemia (H)     Insulin pump status     ADHD (attention deficit hyperactivity disorder)     Depression with anxiety     SIRS (systemic inflammatory response syndrome) (H)     Metabolic acidosis, normal anion gap (NAG)     Thrombosis of right saphenous vein     Physical exam:  Lungs clear to auscultation   Abdomen wound with out evidence of infection   No Lower extremity edema     Assessment/ Plan:  Use incentive spirometer   Toradol for pain  Cbc in am.   Patient is on zosyn.    No obvious source  of the free air.     Time spent with the patient with greater that 50% of the time in discussion was 30 minutes.  In discussing the plan and talking to Dr. Blackman earlier today about the surgery. .      Derek Leung MD

## 2023-05-20 NOTE — PROGRESS NOTES
Oral oxycodone given (5mg), splinted tummy with a pillow for support, ambulated  up to the bathroom and out into the ICU area.  Up in recliner chair.

## 2023-05-20 NOTE — PLAN OF CARE
"Goal Outcome Evaluation:                    Heidi has been up to the bathroom twice on day shift. Have asked her to get up and walk 2 more times today, even if just in the ICU bay area, but so far is declining, stating she has a headache (IV toradol given and ice pack for her head) and states \"No, I do not want to walk, it hurts to get up.\" Explained to her the doctor has written an order for her to ambulate 4 x per day. Encouraging her to get up to walk to help with the abdominal discomfort. Afebrile, blood gases normalizing. Room air. Blood pressures within normal ranges. Heart rate tachycardic in the low 90's.  NG has been clamped since about 1100, no nausea reported.  Have requested the charge nurse to talk to the provider to see if we can get oral pain medication ordered.    Problem: Plan of Care - These are the overarching goals to be used throughout the patient stay.    Goal: Patient-Specific Goal (Individualized)  Description: Heidi will increase activity and ambulate 4 x per day and be up in a chair for part of the day by 5/22/2023 or by date of discharge.  5/20/2023 1523 by Helen Ballard, RN  Outcome: Progressing  5/20/2023 1520 by Helen Ballard, RN  Outcome: Progressing     "

## 2023-05-20 NOTE — PLAN OF CARE
Goal Outcome Evaluation:      Plan of Care Reviewed With: patient    Overall Patient Progress: no changeOverall Patient Progress: no change    Outcome Evaluation: Patient has been calm and cooperative throughout the night. Consistently rates her pain level high when asked but has slept well between cares with no significant obvious discomfort noted. Pt ambulated to the bathroom this morning without difficulty. Dressing has small areas of shadowing, unchanged throughout the night. NG to suction with 100ml+ out. Potassium replaced with recheck later this morning. Max temp of 100.3. Blood sugars have ranged from 90 to 245. Insulin drip continues.

## 2023-05-20 NOTE — PROGRESS NOTES
[facility]    Medicine Progress Note - Hospitalist Service    Date of Admission:  5/19/2023    Assessment & Plan   Heidi Su is a 37 year old female with type 1 diabetes mellitus normally treated with insulin pump who presented with abdominal pain and was taken emergently to the operating room on 5/19 due to concern for bowel perforation with free intraperitoneal air and surgical abdomen.  She was then admitted on 5/19/2023 to the ICU postoperatively.      Type 1 diabetes mellitus treated chronically with insulin pump  Very mild DKA  Most recent hemoglobin A1c was 10.5 in Sept 2022.  Insulin pump was discontinued preoperatively on 5/19.  She is anticipated to be n.p.o. for several days postoperatively and is at risk for DKA.  Prior to surgery she did present with mild metabolic acidosis with normal anion gap and blood sugar 300, but DKA was not suspected preoperatively.  Intraoperative blood sugars were well controlled.  Postoperatively she had transient very mild DKA but maintained normal anion gap with rapid resolution of DKA after starting insulin infusion postoperatively with good glycemic control now on continuous insulin infusion per protocol.  -Patient is not currently capable of managing her own insulin pump postoperatively due to her medical state and ongoing requirement for potentially psychoactive medications including IV narcotics but anticipate reassessing her capacity to manage her insulin pump later in hospitalization as she recovers postoperatively  -We will continue insulin infusion in ICU per diabetes insulin infusion protocol with close blood sugar monitoring while n.p.o.  -Ordered additional laboratory studies including electrolytes, serum ketones, and VBG to monitor for recurrent DKA   -Ordered serial phosphorus levels as well and would add phosphorus supplementation if indicated    Suspected perforated bowel with free intraperitoneal air and surgical  abdomen, status post exploratory laparotomy 5/19  SIRS  Surgeon reported intraoperative findings suspicious for microperforation of ileum of small bowel that was treated surgically intraoperatively.  Patient presented with tachycardia, tachypnea, and leukocytosis concerning for SIRS.  So far, sepsis has not been suspected but she is at risk for worsening infection and sepsis.  -Agree with postoperative antibiotic therapy with IV Zosyn as per recommendation of surgeon  -Agree with n.p.o. status with nasogastric decompression as per recommendation of surgeon  -Other routine postoperative cares including acute pain management, VTE prophylaxis, and disposition planning also deferred to surgeon    Hypokalemia  Developed new hypokalemia this morning probably due to GI losses from nasogastric suction despite continuing IV fluids that contain potassium chloride.  -Add potassium supplementation per standard protocol  -Continue to monitor potassium level closely    Anemia unspecified type  Presented with normal hemoglobin which is dropped from 14 in the ER to 10 today concerning for an acute anemia.  Intraoperative EBL was 10 mL and other active bleeding has not been clinically apparent so far.  Dilutional anemia is possible.  -Ordered hemoglobin monitoring  -Whether to pursue other imaging or procedural evaluation of postoperative anemia is deferred to surgeon but would suggest low threshold for such if anemia worsens and/or she becomes unstable hemodynamically    Right calf saphenous vein thrombus  Aspirin-induced platelet defect  Patient was diagnosed with thrombus in the superficial right calf saphenous vein on May 2 and has been taking aspirin therapy since then.  Aspirin causes platelet function defect that can interfere with blood clotting and cause bleeding.  So far, excessive bleeding has not been suspected.  -Agree with holding aspirin therapy while she is n.p.o.  -Recommend alternate therapy for VTE prophylaxis  particularly while n.p.o., specific recommendations deferred to surgeon in this immediate postoperative time period    Depression with anxiety  ADHD  Outside records refer to previous diagnosis of chronic mental health problems including depression, anxiety, and ADHD for which she normally takes medication treatment.  She presently is n.p.o.  -Supportive care  -Continue low-dose midazolam as needed but avoid combining IV midazolam and IV narcotics simultaneously     Diet: NPO for Medical/Clinical Reasons Except for: Meds, Ice Chips    DVT Prophylaxis: Defer to primary service  Raymundo Catheter: Not present  Lines: None     Cardiac Monitoring: ACTIVE order. Indication: ICU  Code Status: Full Code      Clinically Significant Risk Factors Present on Admission                         Disposition Plan      Expected Discharge Date: 05/23/2023                  Jacrlos Cho MD  Hospitalist Service  [facility]  Securely message with "[employer] (more info)  Text page via [employer] Paging/Directory   ______________________________________________________________________    Interval History   There were no significant overnight events.  Maximum temperature was 100.3.  Heart rate is fluctuated from normal to mildly tachycardic.  Blood pressure has been stable.  Oxygenation has been normal without need for oxygen supplementation.  She has maintained adequate end-tidal CO2.  She has had good urine output.  There has been some NG output but not excessive.  She is not yet passing any flatus.  She reports ongoing abdominal pain that improves after doses of IV analgesics.  She denies any nausea currently.  She offers no other complaints.    Physical Exam   Vital Signs: Temp: 98.9  F (37.2  C) Temp src: Oral BP: (P) 117/83 Pulse: 90   Resp: (P) 16 SpO2: 98 % O2 Device: None (Room air)     Patient Vitals for the past 24 hrs:   BP Temp Temp src Pulse Resp SpO2 Height Weight   05/20/23 0859 -- -- -- -- -- 98 % --  "--   05/20/23 0820 (P) 117/83 98.9  F (37.2  C) Oral -- (P) 16 100 % -- --   05/20/23 0500 -- -- -- 90 18 95 % -- --   05/20/23 0437 110/77 -- -- -- -- -- -- --   05/20/23 0436 -- -- -- -- -- -- -- 73.8 kg (162 lb 11.2 oz)   05/20/23 0435 -- 99.6  F (37.6  C) Oral -- -- -- -- --   05/20/23 0400 98/65 -- -- 100 19 -- -- --   05/20/23 0222 -- 100.1  F (37.8  C) Oral -- 19 97 % -- --   05/20/23 0100 118/68 -- -- 109 17 -- -- --   05/20/23 0000 108/69 -- -- 110 19 -- -- --   05/19/23 2305 111/69 100.3  F (37.9  C) Oral 105 19 97 % -- --   05/19/23 2300 111/69 -- -- 102 17 -- -- --   05/19/23 2200 108/67 -- -- 98 18 -- -- --   05/19/23 2100 105/70 -- -- 95 18 98 % -- --   05/19/23 2000 103/76 -- -- 91 16 -- -- --   05/19/23 1949 -- 99.5  F (37.5  C) Oral 95 18 -- -- --   05/19/23 1946 -- -- -- -- -- 97 % -- --   05/19/23 1930 108/67 -- -- 92 16 -- -- --   05/19/23 1900 108/68 -- -- 93 15 -- -- --   05/19/23 1830 112/71 -- -- 93 14 -- -- --   05/19/23 1815 (!) 116/97 -- -- (!) 124 28 95 % -- --   05/19/23 1800 109/71 -- -- 93 17 -- -- --   05/19/23 1745 108/73 -- -- 93 16 -- -- --   05/19/23 1730 115/63 99.4  F (37.4  C) -- (!) 126 28 94 % -- --   05/19/23 1715 109/71 99.5  F (37.5  C) Oral 95 (!) 0 93 % -- --   05/19/23 1700 136/78 -- -- -- -- -- -- --   05/19/23 1645 118/72 99.6  F (37.6  C) Oral -- -- -- -- --   05/19/23 1630 112/71 -- -- 90 17 96 % 1.676 m (5' 6\") 72.8 kg (160 lb 7.9 oz)   05/19/23 1555 -- -- -- 92 15 94 % -- --   05/19/23 1550 -- 99.5  F (37.5  C) Core 92 15 96 % -- --   05/19/23 1545 105/60 100  F (37.8  C) -- 94 14 99 % -- --   05/19/23 1544 105/60 99.9  F (37.7  C) -- 91 14 97 % -- --   05/19/23 1530 105/67 99.7  F (37.6  C) -- 96 20 97 % -- --   05/19/23 1525 -- 99.7  F (37.6  C) -- 93 16 98 % -- --   05/19/23 1520 106/65 99.9  F (37.7  C) -- 92 16 99 % -- --   05/19/23 1515 107/67 99.9  F (37.7  C) -- 94 17 99 % -- --   05/19/23 1510 109/65 99.7  F (37.6  C) -- 99 18 98 % -- --   05/19/23 1507 " 107/53 99.5  F (37.5  C) -- 117 27 93 % -- --   05/19/23 1505 -- 99.9  F (37.7  C) -- 95 17 99 % -- --   05/19/23 1500 107/53 99.7  F (37.6  C) -- 95 16 98 % -- --   05/19/23 1455 122/66 99.5  F (37.5  C) -- 109 27 93 % -- --   05/19/23 1445 119/75 99  F (37.2  C) -- 110 15 100 % -- --   05/19/23 1440 113/68 99  F (37.2  C) -- 95 15 99 % -- --   05/19/23 1435 114/66 99.1  F (37.3  C) -- 98 15 100 % -- --   05/19/23 1431 (!) 129/105 98.2  F (36.8  C) -- 101 20 98 % -- --   05/19/23 1430 (!) 129/105 98.6  F (37  C) -- 99 28 100 % -- --   05/19/23 1425 118/79 97.7  F (36.5  C) -- 97 14 100 % -- --   05/19/23 1149 (!) 124/90 100.2  F (37.9  C) Temporal 98 20 99 % -- --   05/19/23 1032 -- -- -- -- -- 99 % -- --   05/19/23 1030 (!) 142/88 -- -- 100 -- -- -- --   05/19/23 1025 (!) 147/88 98.3  F (36.8  C) Oral -- 20 100 % -- --     Weight: 162 lbs 11.19 oz    General Appearance: Ill-appearing woman, appears somewhat uncomfortable while resting in bed but is somnolent  Respiratory: Normal respiratory effort, clear lungs  Cardiovascular: Borderline tachycardic with regular rhythm, good radial pulse, brisk capillary refill  GI: Bowel sounds absent, abdomen distended  Skin: Somewhat pale color  Neuro: Somnolent but opens eyes to voice and responds appropriately    Medical Decision Making           Data     I have personally reviewed the following data over the past 24 hrs:    8.8  \   10.2 (L)   / 247     136 107 6.5 /  136 (H)   3.2 (L) 21 (L) 0.61 \        Blood sugars last evening were 200s and overnight ranged  after starting continuous insulin infusion  Ketonemia was present intermittently overnight but this morning serum ketones are negative    Venous Blood Gas  Recent Labs   Lab 05/20/23  0513 05/20/23  0208 05/19/23  2208 05/19/23  1801   PHV 7.37 7.41 7.28* 7.28*   PCO2V 39* 32* 50 43   PO2V 70* 91* 25 46   HCO3V 23 20* 23 20*   BENITO -2.5 -3.5 -3.9 -6.0   O2PER 28 21 21 21     Cardiac monitor results reviewed  over the past 24 hours: Sinus rhythm and sinus tachycardia, no arrhythmias    Imaging results reviewed over the past 24 hrs:   Recent Results (from the past 24 hour(s))   CT Abdomen Pelvis w Contrast    Narrative    CT ABDOMEN AND PELVIS WITH CONTRAST  5/19/2023 10:19 AM    HISTORY: Presenting with chest pain and upper abdominal pain.  Abdominal pain intensifying. CT chest identified free air in abdomen.    TECHNIQUE: CT scan obtained of the abdomen, and pelvis with IV  contrast. ISOVUE-370, 80 mL IV injected. Radiation dose for this scan  was reduced using automated exposure control, adjustment of the mA  and/or kV according to patient size, or iterative reconstruction  technique.    COMPARISON: CT abdomen and pelvis on 3/3/2021 and chest CT on same day  earlier.    FINDINGS:    Lower chest: Please refer to concurrently performed chest CT for  findings related to the lung bases.    Abdomen/pelvis:    Hepatobiliary: No suspicious focal hepatic lesion. The gallbladder is  unremarkable.    Pancreas: No main pancreatic ductal dilatation or definite solid  pancreatic mass.    Spleen: No splenomegaly.    Adrenal glands: No adrenal nodules.    Kidneys: Contrast within the renal collecting system, which precludes  evaluation for kidney stones.    Bowel: No abnormally dilated bowel loops. The appendix is visualized  and appears normal.    Peritoneum: Large amount of free peritoneal air, likely related to  perforated viscus, however, no definite source identified.    Pelvic organs: The uterus is not visualized, likely surgically absent.    Vascular: Unremarkable.    Lymph nodes: No significant abdominopelvic lymphadenopathy.    Bones and soft tissue: No suspicious osseous lesion.      Impression    IMPRESSION:   1. Large amount of free peritoneal air, likely related to perforated  viscus. No definite source identified, although there is significant  thinning of the anterior wall of the gastric body, which could  represent a  potential source.    LIZZ GASCA MD         SYSTEM ID:  [id number]     Recent Labs   Lab 05/20/23  0917 05/20/23  0817 05/20/23  0656 05/20/23  0559 05/20/23  0513 05/20/23  0220 05/20/23  0207 05/19/23  2304 05/19/23  2208 05/19/23  1801 05/19/23  1714 05/19/23  1154 05/19/23  0708   WBC  --   --   --   --  8.8  --   --   --   --   --   --   --  15.1*   HGB  --   --   --   --  10.2*  --   --   --   --   --   --   --  14.1   MCV  --   --   --   --  91  --   --   --   --   --   --   --  91   PLT  --   --   --   --  247  --   --   --   --   --   --   --  340   NA  --   --   --   --  136  --  137  --  138  --  138  --  134*   POTASSIUM  --   --   --   --  3.2*  --  3.3*  --  3.6  --  3.9  --  3.4   CHLORIDE  --   --   --   --  107  --  107  --  107  --  108*  --  100   CO2  --   --   --   --  21*  --  19*  --  21*  --  20*  --  19*   BUN  --   --   --   --  6.5  --   --   --   --   --   --   --  5.4*   CR  --   --   --   --  0.61  --   --   --   --   --  0.58  --  0.55   ANIONGAP  --   --   --   --  8  --  11  --  10  --  10  --  15   NADIR  --   --   --   --  7.7*  --   --   --   --   --   --   --  9.5   * 138* 120*   < > 136*   < >  --    < >  --    < > 200*   < > 300*    < > = values in this interval not displayed.

## 2023-05-20 NOTE — ANESTHESIA POSTPROCEDURE EVALUATION
Patient: Heidi Su    Procedure: Procedure(s):  LAPAROSCOPY, DIAGNOSTIC  LAPAROTOMY  Open Appendectomy       Anesthesia Type:  General    Note:  Disposition: Inpatient   Postop Pain Control: Challenging            Challenges/Interventions: Acute Pain; Exacerbation of chronic pain; Multimodal therapy            Sign Out: ONGOING pain issues (pt is either asleep with IV pain meds and her TAP block still working or she is in 10/10 pain.  there seems no in between.)   PONV: No   Neuro/Psych: Uneventful            Sign Out: PLANNED postop sedation   Airway/Respiratory: Uneventful            Sign Out: Acceptable/Baseline resp. status; O2 supplementation               Oxygen: Nasal Cannula   CV/Hemodynamics: Uneventful            Sign Out: Acceptable CV status   Other NRE: NONE   DID A NON-ROUTINE EVENT OCCUR? No    Event details/Postop Comments:  Pt is sedated and receiving pain meds as she is a difficult pain management pt.  We placed a TAP and pt has been pain medicated with both IV and IM meds (oral route is unavailable at this time).  At this time her anesthesia has not had complications.  She is on an insulin gtt for DKA despite her FSBG's in the 10's intra-op.  Her care will now be dictated by the hospitalist and the general surgery teams.           Last vitals:  Vitals Value Taken Time   /63 05/19/23 1601   Temp 99.5  F (37.5  C) 05/19/23 1550   Pulse 90 05/19/23 1605   Resp 13 05/19/23 1605   SpO2 95 % 05/19/23 1605   Vitals shown include unvalidated device data.    Electronically Signed By: EDGARDO Nieto CRNA  May 19, 2023  8:32 PM

## 2023-05-20 NOTE — PROGRESS NOTES
S-(situation): status update    B-(background): s/p appendectomy, laparotomy    A-(assessment): Patient appears more comfortable today, pleasant and cooperative. Rates her pain 9/10 on 1-10 scale. Gave IV Toradol and will supplement with IV Dilaudid as necessary. Ice bag refreshed and replaced on her tummy incisional area. Midline incision and torcar site, no changes from drainage marked from last evening.  Continues with IV fluids infusing, Insulin pump infusing and NG to low intermittent suction (currently clamped with medication administration). Insulin pump being titrated per algorithm, currently algorithm 2  And 2 units/ hour. IV Zosyn as ordered.    R-(recommendations): Plan to increase activity, up in a chair, Incentive Spirometer as ordered.

## 2023-05-21 PROBLEM — E83.39 HYPOPHOSPHATEMIA: Status: ACTIVE | Noted: 2023-05-21

## 2023-05-21 LAB
ANION GAP SERPL CALCULATED.3IONS-SCNC: 8 MMOL/L (ref 7–15)
ANION GAP SERPL CALCULATED.3IONS-SCNC: 9 MMOL/L (ref 7–15)
B-OH-BUTYR SERPL-SCNC: 0.2 MMOL/L
B-OH-BUTYR SERPL-SCNC: <0.18 MMOL/L
BASE EXCESS BLDV CALC-SCNC: -4.1 MMOL/L (ref -7.7–1.9)
BUN SERPL-MCNC: 5.5 MG/DL (ref 6–20)
CALCIUM SERPL-MCNC: 7.8 MG/DL (ref 8.6–10)
CHLORIDE SERPL-SCNC: 108 MMOL/L (ref 98–107)
CHLORIDE SERPL-SCNC: 109 MMOL/L (ref 98–107)
CREAT SERPL-MCNC: 0.53 MG/DL (ref 0.51–0.95)
DEPRECATED HCO3 PLAS-SCNC: 19 MMOL/L (ref 22–29)
DEPRECATED HCO3 PLAS-SCNC: 19 MMOL/L (ref 22–29)
GFR SERPL CREATININE-BSD FRML MDRD: >90 ML/MIN/1.73M2
GLUCOSE BLDC GLUCOMTR-MCNC: 106 MG/DL (ref 70–99)
GLUCOSE BLDC GLUCOMTR-MCNC: 108 MG/DL (ref 70–99)
GLUCOSE BLDC GLUCOMTR-MCNC: 108 MG/DL (ref 70–99)
GLUCOSE BLDC GLUCOMTR-MCNC: 124 MG/DL (ref 70–99)
GLUCOSE BLDC GLUCOMTR-MCNC: 129 MG/DL (ref 70–99)
GLUCOSE BLDC GLUCOMTR-MCNC: 137 MG/DL (ref 70–99)
GLUCOSE BLDC GLUCOMTR-MCNC: 138 MG/DL (ref 70–99)
GLUCOSE BLDC GLUCOMTR-MCNC: 140 MG/DL (ref 70–99)
GLUCOSE BLDC GLUCOMTR-MCNC: 150 MG/DL (ref 70–99)
GLUCOSE BLDC GLUCOMTR-MCNC: 157 MG/DL (ref 70–99)
GLUCOSE BLDC GLUCOMTR-MCNC: 163 MG/DL (ref 70–99)
GLUCOSE BLDC GLUCOMTR-MCNC: 163 MG/DL (ref 70–99)
GLUCOSE BLDC GLUCOMTR-MCNC: 175 MG/DL (ref 70–99)
GLUCOSE BLDC GLUCOMTR-MCNC: 177 MG/DL (ref 70–99)
GLUCOSE BLDC GLUCOMTR-MCNC: 178 MG/DL (ref 70–99)
GLUCOSE BLDC GLUCOMTR-MCNC: 83 MG/DL (ref 70–99)
GLUCOSE BLDC GLUCOMTR-MCNC: 97 MG/DL (ref 70–99)
GLUCOSE BLDC GLUCOMTR-MCNC: 98 MG/DL (ref 70–99)
GLUCOSE BLDC GLUCOMTR-MCNC: 99 MG/DL (ref 70–99)
GLUCOSE SERPL-MCNC: 173 MG/DL (ref 70–99)
HBA1C MFR BLD: 8.9 %
HCO3 BLDV-SCNC: 21 MMOL/L (ref 21–28)
HGB BLD-MCNC: 9.4 G/DL (ref 11.7–15.7)
HOLD SPECIMEN: NORMAL
O2/TOTAL GAS SETTING VFR VENT: 28 %
PCO2 BLDV: 38 MM HG (ref 40–50)
PH BLDV: 7.35 [PH] (ref 7.32–7.43)
PHOSPHATE SERPL-MCNC: 2.8 MG/DL (ref 2.5–4.5)
PO2 BLDV: 76 MM HG (ref 25–47)
POTASSIUM SERPL-SCNC: 3.4 MMOL/L (ref 3.4–5.3)
POTASSIUM SERPL-SCNC: 3.6 MMOL/L (ref 3.4–5.3)
POTASSIUM SERPL-SCNC: 4 MMOL/L (ref 3.4–5.3)
SODIUM SERPL-SCNC: 135 MMOL/L (ref 136–145)
SODIUM SERPL-SCNC: 137 MMOL/L (ref 136–145)

## 2023-05-21 PROCEDURE — 250N000011 HC RX IP 250 OP 636: Performed by: PEDIATRICS

## 2023-05-21 PROCEDURE — 82803 BLOOD GASES ANY COMBINATION: CPT | Performed by: PEDIATRICS

## 2023-05-21 PROCEDURE — 200N000001 HC R&B ICU

## 2023-05-21 PROCEDURE — 250N000011 HC RX IP 250 OP 636: Performed by: SURGERY

## 2023-05-21 PROCEDURE — 36415 COLL VENOUS BLD VENIPUNCTURE: CPT | Performed by: SPECIALIST

## 2023-05-21 PROCEDURE — 36415 COLL VENOUS BLD VENIPUNCTURE: CPT | Performed by: PEDIATRICS

## 2023-05-21 PROCEDURE — 82010 KETONE BODYS QUAN: CPT | Performed by: PEDIATRICS

## 2023-05-21 PROCEDURE — 80048 BASIC METABOLIC PNL TOTAL CA: CPT | Performed by: PEDIATRICS

## 2023-05-21 PROCEDURE — 250N000013 HC RX MED GY IP 250 OP 250 PS 637: Performed by: PEDIATRICS

## 2023-05-21 PROCEDURE — C9113 INJ PANTOPRAZOLE SODIUM, VIA: HCPCS | Performed by: PEDIATRICS

## 2023-05-21 PROCEDURE — 258N000003 HC RX IP 258 OP 636: Performed by: PEDIATRICS

## 2023-05-21 PROCEDURE — 85018 HEMOGLOBIN: CPT | Performed by: PEDIATRICS

## 2023-05-21 PROCEDURE — 84132 ASSAY OF SERUM POTASSIUM: CPT | Performed by: SPECIALIST

## 2023-05-21 PROCEDURE — 83036 HEMOGLOBIN GLYCOSYLATED A1C: CPT | Performed by: PEDIATRICS

## 2023-05-21 PROCEDURE — 99232 SBSQ HOSP IP/OBS MODERATE 35: CPT | Performed by: PEDIATRICS

## 2023-05-21 PROCEDURE — 250N000013 HC RX MED GY IP 250 OP 250 PS 637: Performed by: SPECIALIST

## 2023-05-21 PROCEDURE — 250N000013 HC RX MED GY IP 250 OP 250 PS 637: Performed by: INTERNAL MEDICINE

## 2023-05-21 PROCEDURE — 250N000013 HC RX MED GY IP 250 OP 250 PS 637: Performed by: SURGERY

## 2023-05-21 PROCEDURE — 84100 ASSAY OF PHOSPHORUS: CPT | Performed by: PEDIATRICS

## 2023-05-21 RX ORDER — POTASSIUM CHLORIDE 1500 MG/1
40 TABLET, EXTENDED RELEASE ORAL ONCE
Status: COMPLETED | OUTPATIENT
Start: 2023-05-21 | End: 2023-05-21

## 2023-05-21 RX ORDER — LABETALOL HYDROCHLORIDE 5 MG/ML
10 INJECTION, SOLUTION INTRAVENOUS
Status: DISCONTINUED | OUTPATIENT
Start: 2023-05-21 | End: 2023-05-22 | Stop reason: HOSPADM

## 2023-05-21 RX ORDER — CALCIUM CARBONATE 500 MG/1
500 TABLET, CHEWABLE ORAL
Status: COMPLETED | OUTPATIENT
Start: 2023-05-21 | End: 2023-05-21

## 2023-05-21 RX ORDER — POTASSIUM CHLORIDE 1.5 G/1.58G
40 POWDER, FOR SOLUTION ORAL ONCE
Status: COMPLETED | OUTPATIENT
Start: 2023-05-21 | End: 2023-05-21

## 2023-05-21 RX ADMIN — CALCIUM CARBONATE (ANTACID) CHEW TAB 500 MG 500 MG: 500 CHEW TAB at 04:09

## 2023-05-21 RX ADMIN — PANTOPRAZOLE SODIUM 40 MG: 40 INJECTION, POWDER, FOR SOLUTION INTRAVENOUS at 08:30

## 2023-05-21 RX ADMIN — OXYCODONE HYDROCHLORIDE 5 MG: 5 TABLET ORAL at 08:23

## 2023-05-21 RX ADMIN — ACETAMINOPHEN 975 MG: 325 TABLET ORAL at 04:09

## 2023-05-21 RX ADMIN — POLYETHYLENE GLYCOL 3350 17 G: 17 POWDER, FOR SOLUTION ORAL at 17:39

## 2023-05-21 RX ADMIN — OXYCODONE HYDROCHLORIDE 5 MG: 5 TABLET ORAL at 21:16

## 2023-05-21 RX ADMIN — ENOXAPARIN SODIUM 40 MG: 40 INJECTION SUBCUTANEOUS at 08:32

## 2023-05-21 RX ADMIN — Medication 1 PATCH: at 08:42

## 2023-05-21 RX ADMIN — ACETAMINOPHEN 975 MG: 325 TABLET ORAL at 13:08

## 2023-05-21 RX ADMIN — POTASSIUM CHLORIDE, DEXTROSE MONOHYDRATE AND SODIUM CHLORIDE: 150; 5; 450 INJECTION, SOLUTION INTRAVENOUS at 20:55

## 2023-05-21 RX ADMIN — PIPERACILLIN AND TAZOBACTAM 3.38 G: 3; .375 INJECTION, POWDER, FOR SOLUTION INTRAVENOUS at 17:24

## 2023-05-21 RX ADMIN — ACETAMINOPHEN 975 MG: 325 TABLET ORAL at 20:55

## 2023-05-21 RX ADMIN — PANTOPRAZOLE SODIUM 40 MG: 40 INJECTION, POWDER, FOR SOLUTION INTRAVENOUS at 20:55

## 2023-05-21 RX ADMIN — POTASSIUM CHLORIDE, DEXTROSE MONOHYDRATE AND SODIUM CHLORIDE: 150; 5; 450 INJECTION, SOLUTION INTRAVENOUS at 09:47

## 2023-05-21 RX ADMIN — PIPERACILLIN AND TAZOBACTAM 3.38 G: 3; .375 INJECTION, POWDER, FOR SOLUTION INTRAVENOUS at 05:06

## 2023-05-21 RX ADMIN — POTASSIUM CHLORIDE, DEXTROSE MONOHYDRATE AND SODIUM CHLORIDE: 150; 5; 450 INJECTION, SOLUTION INTRAVENOUS at 00:59

## 2023-05-21 RX ADMIN — OXYCODONE HYDROCHLORIDE 5 MG: 5 TABLET ORAL at 03:43

## 2023-05-21 RX ADMIN — SENNOSIDES AND DOCUSATE SODIUM 1 TABLET: 8.6; 5 TABLET ORAL at 20:55

## 2023-05-21 RX ADMIN — SENNOSIDES AND DOCUSATE SODIUM 1 TABLET: 8.6; 5 TABLET ORAL at 08:24

## 2023-05-21 RX ADMIN — KETOROLAC TROMETHAMINE 30 MG: 30 INJECTION, SOLUTION INTRAMUSCULAR at 11:24

## 2023-05-21 RX ADMIN — PIPERACILLIN AND TAZOBACTAM 3.38 G: 3; .375 INJECTION, POWDER, FOR SOLUTION INTRAVENOUS at 23:41

## 2023-05-21 RX ADMIN — OXYCODONE HYDROCHLORIDE 5 MG: 5 TABLET ORAL at 13:08

## 2023-05-21 RX ADMIN — POTASSIUM CHLORIDE 40 MEQ: 1500 TABLET, EXTENDED RELEASE ORAL at 08:43

## 2023-05-21 RX ADMIN — KETOROLAC TROMETHAMINE 30 MG: 30 INJECTION, SOLUTION INTRAMUSCULAR at 03:43

## 2023-05-21 RX ADMIN — PIPERACILLIN AND TAZOBACTAM 3.38 G: 3; .375 INJECTION, POWDER, FOR SOLUTION INTRAVENOUS at 11:25

## 2023-05-21 RX ADMIN — OXYCODONE HYDROCHLORIDE 5 MG: 5 TABLET ORAL at 17:29

## 2023-05-21 ASSESSMENT — ACTIVITIES OF DAILY LIVING (ADL)
ADLS_ACUITY_SCORE: 20

## 2023-05-21 NOTE — PROGRESS NOTES
[facility]    Medicine Progress Note - Hospitalist Service    Date of Admission:  5/19/2023    Assessment & Plan   Heidi Su is a 37 year old female with type 1 diabetes mellitus normally treated with insulin pump who presented with abdominal pain and was taken emergently to the operating room on 5/19 due to concern for bowel perforation with free intraperitoneal air and surgical abdomen.  She was then admitted on 5/19/2023 to the ICU postoperatively.      Type 1 diabetes mellitus treated chronically with insulin pump  Very mild DKA  Most recent hemoglobin A1c was 10.5 in Sept 2022.  Insulin pump was discontinued preoperatively on 5/19.  She is anticipated to be n.p.o. for several days postoperatively and is at risk for DKA.  Prior to surgery she did present with mild metabolic acidosis with normal anion gap and blood sugar 300, but DKA was not suspected preoperatively.  Intraoperative blood sugars were well controlled.  Postoperatively she had transient very mild DKA but maintained normal anion gap with rapid resolution of DKA after starting insulin infusion postoperatively with good glycemic control now on continuous insulin infusion per protocol.  -Patient is not currently capable of managing her own insulin pump postoperatively due to her medical state and ongoing requirement for potentially psychoactive medications including IV narcotics but anticipate reassessing her capacity to manage her insulin pump later in hospitalization as she recovers postoperatively  -We will continue insulin infusion in ICU per diabetes insulin infusion protocol with close blood sugar monitoring while n.p.o.  -If diet starts to advance, anticipate adding prandial NovoLog carb coverage (her normal ratio is 1 unit per 15 g carb)  -Ordered additional laboratory studies including A1c, electrolytes, serum ketones, and VBG to monitor for recurrent DKA     Suspected perforated bowel with free  intraperitoneal air and surgical abdomen, status post exploratory laparotomy 5/19  SIRS  Surgeon reported intraoperative findings suspicious for microperforation of ileum of small bowel that was treated surgically intraoperatively.  Patient presented with tachycardia, tachypnea, and leukocytosis concerning for SIRS.  So far, sepsis has not been suspected but she is at risk for worsening infection and sepsis.  -Agree with postoperative antibiotic therapy with IV Zosyn as per recommendation of surgeon  -Agree with n.p.o. status with nasogastric decompression as per recommendation of surgeon  -Other routine postoperative cares including acute pain management, VTE prophylaxis, and disposition planning also deferred to surgeon    Hypokalemia  Hypophosphatemia  Developed new hypokalemia 5/20 probably due to GI losses from nasogastric suction despite continuing IV fluids that contain potassium chloride.  Also developed new hypophosphatemia 5/20 probably due to inadequate nutritional intake at which time phosphorus supplementation was added.  -Continue potassium supplementation per standard protocol  -Continue to monitor potassium level closely  -Continue phosphorus supplementation per standard protocol  -Continue to monitor phosphorus level closely    Anemia unspecified type  Presented with normal hemoglobin which is dropped from 14 in the ER to 10 on POD 1 and now 9 on POD 2 concerning for an acute anemia.  Intraoperative EBL was 10 mL and other active bleeding has not been clinically apparent so far.  Dilutional anemia is possible.  -Ordered hemoglobin monitoring  -Whether to pursue other imaging or procedural evaluation of postoperative anemia is deferred to surgeon but would suggest low threshold for such if anemia worsens and/or she becomes unstable hemodynamically    Right calf saphenous vein thrombus  Aspirin-induced platelet defect  Patient was diagnosed with thrombus in the superficial right calf saphenous vein on  May 2 and has been taking aspirin therapy since then.  Aspirin causes platelet function defect that can interfere with blood clotting and cause bleeding.  So far, excessive bleeding has not been suspected.  -Agree with holding aspirin therapy while she is n.p.o.  -Recommend alternate therapy for VTE prophylaxis particularly while n.p.o., specific recommendations deferred to surgeon in this immediate postoperative time period    Depression with anxiety  ADHD  Outside records refer to previous diagnosis of chronic mental health problems including depression, anxiety, and ADHD for which she normally takes medication treatment.  She presently is n.p.o.  -Supportive care  -Continue low-dose midazolam as needed but avoid combining IV midazolam and IV narcotics simultaneously       Diet: NPO for Medical/Clinical Reasons Except for: Meds, Ice Chips    DVT Prophylaxis: Enoxaparin (Lovenox) SQ and Pneumatic Compression Devices  Raymundo Catheter: Not present  Lines: None     Cardiac Monitoring: ACTIVE order. Indication: ICU  Code Status: Full Code      Clinically Significant Risk Factors                          Disposition Plan   Unknown          Jcarlos Cho MD  Hospitalist Service  [facility]  Securely message with [employer] (more info)  Text page via [employer] Paging/Directory   ______________________________________________________________________    Interval History   There were no significant overnight events.  She did start to pass some flatus later in the day yesterday.  She denies any nausea.  She continues to have abdominal pain but is now using oxycodone with benefit for abdominal pain.  She has been afebrile and hemodynamically stable.  She is having mildly elevated blood pressure readings over the last day but is not overtly symptomatic from higher blood pressure readings.  She feels somewhat thirsty and is starting to get hungry.  She remains n.p.o. at this time.  Nasogastric tube was  clamped midday yesterday and she has not had any worsening nausea or abdominal pain since that time.  She was able to tolerate oral medication this morning with sips of water.  She has had good urine output.    Physical Exam   Vital Signs: Temp: 97.8  F (36.6  C) Temp src: Oral BP: (!) 145/101 Pulse: 82   Resp: 12 SpO2: 97 % O2 Device: None (Room air)     Patient Vitals for the past 24 hrs:   BP Temp Temp src Pulse Resp SpO2   05/21/23 0823 (!) 145/101 97.8  F (36.6  C) Oral 82 12 97 %   05/21/23 0349 (!) 138/97 99.2  F (37.3  C) Oral 83 19 99 %   05/20/23 2336 (!) 137/102 -- -- 92 18 96 %   05/20/23 2332 -- 98.9  F (37.2  C) Oral -- -- --   05/20/23 1931 (!) 136/94 99.3  F (37.4  C) Oral 96 20 95 %   05/20/23 1610 (!) 138/94 -- -- -- -- --   05/20/23 1607 -- -- -- 90 18 97 %   05/20/23 1307 -- 98.8  F (37.1  C) Oral 94 22 96 %   05/20/23 1225 -- -- -- 89 22 98 %   05/20/23 1220 132/89 -- -- 92 (!) 31 --     Weight: 162 lbs 11.19 oz    General Appearance: Ill-appearing woman without signs of acute distress resting in bed, appears tired  Respiratory: Normal respiratory effort, clear lungs  Cardiovascular: Regular rate and rhythm, good radial pulse, normal capillary refill  GI: Hypoactive bowel sounds  Skin: Pale color  Other: Alert and maintains wakefulness and attention, no confusion evident    Medical Decision Making           Data     I have personally reviewed the following data over the past 24 hrs:    N/A  \   9.4 (L)   / N/A     135 (L) 108 (H) 5.5 (L) /  108 (H)   3.4 19 (L) 0.53 \        Blood sugars ranged  over the last day  Phosphorus late yesterday was 1.9 and improved to 2.8 today after supplementation  Serum ketones negative this morning    Cardiac monitor results reviewed over the past 24 hrs: Normal sinus rhythm, no arrhythmias    Recent Labs   Lab 05/21/23  0946 05/21/23  0827 05/21/23  0659 05/21/23  0522 05/21/23  0057 05/20/23  2344 05/20/23  2048 05/20/23  1939 05/20/23  1812  05/20/23 1809 05/20/23  0559 05/20/23  0513 05/19/23  1801 05/19/23  1714 05/19/23  1154 05/19/23  0708   WBC  --   --   --   --   --   --   --   --   --   --   --  8.8  --   --   --  15.1*   HGB  --   --   --  9.4*  --  9.9*  --  10.1*  --   --   --  10.2*  --   --   --  14.1   MCV  --   --   --   --   --   --   --   --   --   --   --  91  --   --   --  91   PLT  --   --   --   --   --   --   --   --   --   --   --  247  --   --   --  340   NA  --   --   --  135*  --  137  --   --   --  139  --  136   < > 138  --  134*   POTASSIUM  --   --   --  3.4  --  3.6  --   --   --  3.7   < > 3.2*   < > 3.9  --  3.4   CHLORIDE  --   --   --  108*  --  109*  --   --   --  110*  --  107   < > 108*  --  100   CO2  --   --   --  19*  --  19*  --   --   --  20*  --  21*   < > 20*  --  19*   BUN  --   --   --  5.5*  --   --   --   --   --   --   --  6.5  --   --   --  5.4*   CR  --   --   --  0.53  --   --   --   --   --   --   --  0.61  --  0.58  --  0.55   ANIONGAP  --   --   --  8  --  9  --   --   --  9  --  8   < > 10  --  15   NADIR  --   --   --  7.8*  --   --   --   --   --   --   --  7.7*  --   --   --  9.5   * 138* 177* 173*   < >  --    < >  --    < >  --    < > 136*   < > 200*   < > 300*    < > = values in this interval not displayed.

## 2023-05-21 NOTE — PROGRESS NOTES
Patient took sip of water with oral meds, tolerating without any nausea. After the charge nurse talked to the surgeon (Dr. Leung) ok'd to remove the NG, patient tolerated that also. Up in halls ambulating, oral oxycodone, tylenol, IV toradol for pain management today. Also ice pack to incisional area.     IV in right arm infiltrated,  so d/c'd, no pain, no redness associated with infiltration, warm pack applied.  IV infusion moved to other access in left arm. Will obtain another IV later.     Voiding in adequate amounts.  Up almost 3 kg since admission.  HTN noted, provider is aware.

## 2023-05-21 NOTE — PROGRESS NOTES
S-(situation): status update    B-(background):appy/microperf ileum    A-(assessment):  Patient is improving, needed less assistance out of bed and into the bathroom this morning. States she is still sore, gave 5 mg oxycodone for the discomfort. Abdomen slightly distended but no nausea. Attempted oral packets of potassium supplement in a bit of water but she could not tolerate, so changed to oral potassium tablets in a spoonful of water.  No complaints of headache this morning. Afebrile. Continues on insulin drip at algorithm 2 and now 2 units/hour.  Will change surgical site dressings today per orders. Noted hypertension this morning 145/101, was after she returned from ambulating into the bathroom. Continues on IV fluids for hydration. NG in place but has been clamped since about 12 noon yesterday.    R-(recommendations): Increase activity today, change surgical dressings, continue blood sugar monitoring with insulin drip titration, follow labs.

## 2023-05-21 NOTE — CONSULTS
Care Management Initial Consult    General Information  Assessment completed with: Patient,    Type of CM/SW Visit: Initial Assessment    Primary Care Provider verified and updated as needed: Yes   Readmission within the last 30 days: no previous admission in last 30 days      Reason for Consult: other (see comments) (Elevated readmission risk score)  Advance Care Planning: Advance Care Planning Reviewed: no concerns identified          Communication Assessment  Patient's communication style: spoken language (English or Bilingual)    Hearing Difficulty or Deaf: no   Wear Glasses or Blind: no    Cognitive  Cognitive/Neuro/Behavioral: WDL  Level of Consciousness: alert  Arousal Level: opens eyes spontaneously     Mood/Behavior: calm, cooperative          Living Environment:   People in home:       Current living Arrangements: apartment      Able to return to prior arrangements: yes       Family/Social Support:  Care provided by: parent(s)  Provides care for:    Marital Status: Single  Parent(s)          Description of Support System: Supportive, Involved    Support Assessment: Adequate family and caregiver support, Adequate social supports    Current Resources:   Patient receiving home care services: No     Community Resources: [facility]  Equipment currently used at home: none  Supplies currently used at home:      Employment/Financial:  Employment Status:          Financial Concerns:             Does the patient's insurance plan have a 3 day qualifying hospital stay waiver?  No    Lifestyle & Psychosocial Needs:  Social Determinants of Health     Tobacco Use: High Risk (5/19/2023)    Patient History      Smoking Tobacco Use: Every Day      Smokeless Tobacco Use: Never      Passive Exposure: Not on file   Alcohol Use: Not on file   Financial Resource Strain: Not on file   Food Insecurity: Not on file   Transportation Needs: Not on file   Physical Activity: Not on file   Stress: Not on file   Social Connections:  Not on file   Intimate Partner Violence: Not on file   Depression: Not on file   Housing Stability: Not on file       Functional Status:  Prior to admission patient needed assistance:              Mental Health Status:  Mental Health Status: No Current Concerns  Mental Health Management: Medication    Chemical Dependency Status:  Chemical Dependency Status: No Current Concerns             Values/Beliefs:  Spiritual, Cultural Beliefs, Restoration Practices, Values that affect care:            Values/Beliefs Comment: Unknown    Additional Information:  Care Management consulted due to elevated risk for readmission.     Writer visited with patient for a brief time.  Patient lethargic and falling asleep between questions.  Patient able to answer questions for a brief time.      Discussed with patient that Care Management will follow during hospitalization and be available to assist with discharge planning if any discharge needs are indicated.  Patient verified that she sees a provider from the [facility] in [place].  Recommendation for patient to follow up with her PCP within 5-7 days of discharge.      Patient plans to discharge to home. Patient stated family would drive her home at discharge. ICU nurse today stated that she does not anticipate that patient will have any needs at discharge.      Care Management will continue to follow.      ASAEL Estrada  [facility]   [phone]

## 2023-05-21 NOTE — PROGRESS NOTES
Patients' significant other brought Heidi's insulin pump home due to it incessantly beeping which was annoying the patient as she admittedly has a sensory annoyance from her ADHD.

## 2023-05-21 NOTE — PROVIDER NOTIFICATION
Significant other brought patients insulin pump home (due to incessant beeping, for which we had removed it from the room the day prior) which was irritating Heidi as she has a sensory annoyance from her ADHD- per patient.

## 2023-05-21 NOTE — PROGRESS NOTES
Dressing change of surgical sites (midline and trocar site) completed. Old dressing with old dried drainage, incisional site clean, dry and intact, slight redness noted around the edges if the incision line, but to be expected. No new drainage, no purulent drainage noted. Tolerated dressing change well, was medicated about 1 hour prior with oxycodone.

## 2023-05-21 NOTE — PROGRESS NOTES
Patient complains of  Minimal pain but is hungry   passing flatus  Pain  under control    vitals  Patient Vitals for the past 24 hrs:   BP Temp Temp src Pulse Resp SpO2   05/21/23 0823 (!) 145/101 97.8  F (36.6  C) Oral 82 12 97 %   05/21/23 0349 (!) 138/97 99.2  F (37.3  C) Oral 83 19 99 %   05/20/23 2336 (!) 137/102 -- -- 92 18 96 %   05/20/23 2332 -- 98.9  F (37.2  C) Oral -- -- --   05/20/23 1931 (!) 136/94 99.3  F (37.4  C) Oral 96 20 95 %   05/20/23 1610 (!) 138/94 -- -- -- -- --   05/20/23 1607 -- -- -- 90 18 97 %   05/20/23 1307 -- 98.8  F (37.1  C) Oral 94 22 96 %   05/20/23 1225 -- -- -- 89 22 98 %   05/20/23 1220 132/89 -- -- 92 (!) 31 --     Hospital problem list  Patient Active Problem List   Diagnosis     CARDIOVASCULAR SCREENING; LDL GOAL LESS THAN 160     Surgical abdomen     Free intraperitoneal air     Type 1 diabetes mellitus with hyperglycemia (H)     Insulin pump status     ADHD (attention deficit hyperactivity disorder)     Depression with anxiety     SIRS (systemic inflammatory response syndrome) (H)     Diabetic ketoacidosis without coma associated with type 1 diabetes mellitus (H)     Thrombosis of right saphenous vein     Hypokalemia     Anemia, unspecified type     Hypophosphatemia     Physical exam:  Lungs clear to auscultation   Abdomen wound with out evidence of infection   No Lower extremity edema    Notes                Component Ref Range & Units  5:22 AM 1 d ago 1 d ago 1 d ago 2 d ago 2 yr ago 4 yr ago    Hemoglobin 11.7 - 15.7 g/dL 9.4 Low   9.9 Low   10.1 Low   10.2 Low   14.1  14.4  15.5    Resulting Agency                  Assessment/ Plan:  Advance diet as tolerated   Hopefully home in the am.           Derek Leung MD

## 2023-05-21 NOTE — PLAN OF CARE
Goal Outcome Evaluation:      Plan of Care Reviewed With: patient    Overall Patient Progress: improvingOverall Patient Progress: improving    Outcome Evaluation: Patient has been sleeping comfortably throughout the night. Pain controlled with oxycodone, Tylenol and toradol. She ambulated in the halls x 1. Old shadowing on dressing. Due for first change today. NG remains clamped. No complaints of nausea but Tums given for acid reflux discomfort. Insulin drip continues. BS . Phosphorus replaced with recheck pending this morning.

## 2023-05-22 VITALS
TEMPERATURE: 98.9 F | OXYGEN SATURATION: 98 % | HEIGHT: 66 IN | HEART RATE: 57 BPM | DIASTOLIC BLOOD PRESSURE: 100 MMHG | RESPIRATION RATE: 18 BRPM | SYSTOLIC BLOOD PRESSURE: 174 MMHG | WEIGHT: 166.67 LBS | BODY MASS INDEX: 26.79 KG/M2

## 2023-05-22 LAB
ANION GAP SERPL CALCULATED.3IONS-SCNC: 9 MMOL/L (ref 7–15)
B-OH-BUTYR SERPL-SCNC: 0.2 MMOL/L
BASE EXCESS BLDV CALC-SCNC: -3.6 MMOL/L (ref -7.7–1.9)
BUN SERPL-MCNC: 3.6 MG/DL (ref 6–20)
CALCIUM SERPL-MCNC: 8.2 MG/DL (ref 8.6–10)
CHLORIDE SERPL-SCNC: 108 MMOL/L (ref 98–107)
CREAT SERPL-MCNC: 0.53 MG/DL (ref 0.51–0.95)
DEPRECATED HCO3 PLAS-SCNC: 20 MMOL/L (ref 22–29)
GFR SERPL CREATININE-BSD FRML MDRD: >90 ML/MIN/1.73M2
GLUCOSE BLDC GLUCOMTR-MCNC: 139 MG/DL (ref 70–99)
GLUCOSE BLDC GLUCOMTR-MCNC: 153 MG/DL (ref 70–99)
GLUCOSE BLDC GLUCOMTR-MCNC: 155 MG/DL (ref 70–99)
GLUCOSE BLDC GLUCOMTR-MCNC: 158 MG/DL (ref 70–99)
GLUCOSE BLDC GLUCOMTR-MCNC: 167 MG/DL (ref 70–99)
GLUCOSE BLDC GLUCOMTR-MCNC: 167 MG/DL (ref 70–99)
GLUCOSE BLDC GLUCOMTR-MCNC: 171 MG/DL (ref 70–99)
GLUCOSE BLDC GLUCOMTR-MCNC: 174 MG/DL (ref 70–99)
GLUCOSE BLDC GLUCOMTR-MCNC: 179 MG/DL (ref 70–99)
GLUCOSE BLDC GLUCOMTR-MCNC: 190 MG/DL (ref 70–99)
GLUCOSE SERPL-MCNC: 172 MG/DL (ref 70–99)
HCO3 BLDV-SCNC: 21 MMOL/L (ref 21–28)
HGB BLD-MCNC: 9.4 G/DL (ref 11.7–15.7)
O2/TOTAL GAS SETTING VFR VENT: 21 %
PCO2 BLDV: 36 MM HG (ref 40–50)
PH BLDV: 7.38 [PH] (ref 7.32–7.43)
PHOSPHATE SERPL-MCNC: 2.8 MG/DL (ref 2.5–4.5)
PLATELET # BLD AUTO: 239 10E3/UL (ref 150–450)
PO2 BLDV: 68 MM HG (ref 25–47)
POTASSIUM SERPL-SCNC: 3.9 MMOL/L (ref 3.4–5.3)
SODIUM SERPL-SCNC: 137 MMOL/L (ref 136–145)

## 2023-05-22 PROCEDURE — 250N000011 HC RX IP 250 OP 636: Performed by: PEDIATRICS

## 2023-05-22 PROCEDURE — 258N000003 HC RX IP 258 OP 636: Performed by: PEDIATRICS

## 2023-05-22 PROCEDURE — 99239 HOSP IP/OBS DSCHRG MGMT >30: CPT | Performed by: PEDIATRICS

## 2023-05-22 PROCEDURE — 250N000013 HC RX MED GY IP 250 OP 250 PS 637: Performed by: PEDIATRICS

## 2023-05-22 PROCEDURE — 80048 BASIC METABOLIC PNL TOTAL CA: CPT | Performed by: PEDIATRICS

## 2023-05-22 PROCEDURE — 250N000013 HC RX MED GY IP 250 OP 250 PS 637: Performed by: SURGERY

## 2023-05-22 PROCEDURE — 82803 BLOOD GASES ANY COMBINATION: CPT | Performed by: PEDIATRICS

## 2023-05-22 PROCEDURE — 84100 ASSAY OF PHOSPHORUS: CPT | Performed by: SPECIALIST

## 2023-05-22 PROCEDURE — 82010 KETONE BODYS QUAN: CPT | Performed by: PEDIATRICS

## 2023-05-22 PROCEDURE — 85049 AUTOMATED PLATELET COUNT: CPT | Performed by: PEDIATRICS

## 2023-05-22 PROCEDURE — 250N000011 HC RX IP 250 OP 636: Performed by: SURGERY

## 2023-05-22 PROCEDURE — 85018 HEMOGLOBIN: CPT | Performed by: PEDIATRICS

## 2023-05-22 PROCEDURE — C9113 INJ PANTOPRAZOLE SODIUM, VIA: HCPCS | Performed by: PEDIATRICS

## 2023-05-22 PROCEDURE — 36415 COLL VENOUS BLD VENIPUNCTURE: CPT | Performed by: PEDIATRICS

## 2023-05-22 PROCEDURE — 250N000012 HC RX MED GY IP 250 OP 636 PS 637: Performed by: PEDIATRICS

## 2023-05-22 RX ORDER — OXYCODONE HYDROCHLORIDE 5 MG/1
5 TABLET ORAL EVERY 6 HOURS PRN
Qty: 12 TABLET | Refills: 0 | Status: SHIPPED | OUTPATIENT
Start: 2023-05-22 | End: 2023-05-25

## 2023-05-22 RX ADMIN — PIPERACILLIN AND TAZOBACTAM 3.38 G: 3; .375 INJECTION, POWDER, FOR SOLUTION INTRAVENOUS at 05:14

## 2023-05-22 RX ADMIN — SENNOSIDES AND DOCUSATE SODIUM 1 TABLET: 8.6; 5 TABLET ORAL at 08:36

## 2023-05-22 RX ADMIN — POLYETHYLENE GLYCOL 3350 17 G: 17 POWDER, FOR SOLUTION ORAL at 08:36

## 2023-05-22 RX ADMIN — POTASSIUM CHLORIDE, DEXTROSE MONOHYDRATE AND SODIUM CHLORIDE: 150; 5; 450 INJECTION, SOLUTION INTRAVENOUS at 06:53

## 2023-05-22 RX ADMIN — INSULIN ASPART 3 UNITS: 100 INJECTION, SOLUTION INTRAVENOUS; SUBCUTANEOUS at 08:37

## 2023-05-22 RX ADMIN — HYDROXYZINE HYDROCHLORIDE 25 MG: 25 TABLET, FILM COATED ORAL at 11:44

## 2023-05-22 RX ADMIN — OXYCODONE HYDROCHLORIDE 5 MG: 5 TABLET ORAL at 09:22

## 2023-05-22 RX ADMIN — OXYCODONE HYDROCHLORIDE 5 MG: 5 TABLET ORAL at 05:14

## 2023-05-22 RX ADMIN — ACETAMINOPHEN 975 MG: 325 TABLET ORAL at 04:36

## 2023-05-22 RX ADMIN — ENOXAPARIN SODIUM 40 MG: 40 INJECTION SUBCUTANEOUS at 08:37

## 2023-05-22 RX ADMIN — PANTOPRAZOLE SODIUM 40 MG: 40 INJECTION, POWDER, FOR SOLUTION INTRAVENOUS at 08:36

## 2023-05-22 RX ADMIN — OXYCODONE HYDROCHLORIDE 5 MG: 5 TABLET ORAL at 01:32

## 2023-05-22 RX ADMIN — Medication 1 PATCH: at 08:36

## 2023-05-22 RX ADMIN — PIPERACILLIN AND TAZOBACTAM 3.38 G: 3; .375 INJECTION, POWDER, FOR SOLUTION INTRAVENOUS at 11:44

## 2023-05-22 RX ADMIN — KETOROLAC TROMETHAMINE 30 MG: 30 INJECTION, SOLUTION INTRAMUSCULAR at 09:22

## 2023-05-22 RX ADMIN — OXYCODONE HYDROCHLORIDE 5 MG: 5 TABLET ORAL at 14:20

## 2023-05-22 ASSESSMENT — ACTIVITIES OF DAILY LIVING (ADL)
ADLS_ACUITY_SCORE: 20

## 2023-05-22 NOTE — DISCHARGE SUMMARY
"[facility]  Hospitalist Discharge Summary      Date of Admission:  5/19/2023  Date of Discharge:  5/22/2023  Discharging Provider: Jcarlos Cho MD  Discharge Service: Hospitalist Service    Discharge Diagnoses   Principal Problem:    Surgical abdomen  Active Problems:    Free intraperitoneal air    Type 1 diabetes mellitus with hyperglycemia (H)    SIRS (systemic inflammatory response syndrome) (H)    Diabetic ketoacidosis without coma associated with type 1 diabetes mellitus (H)    Hypokalemia    Anemia, unspecified type    Hypophosphatemia    Insulin pump status    ADHD (attention deficit hyperactivity disorder)    Depression with anxiety    Thrombosis of right saphenous vein    Clinically Significant Risk Factors     # Overweight: Estimated body mass index is 26.9 kg/m  as calculated from the following:    Height as of this encounter: 1.676 m (5' 6\").    Weight as of this encounter: 75.6 kg (166 lb 10.7 oz).       Follow-ups Needed After Discharge   Follow-up Appointments     Follow-up and recommended labs and tests       Follow up with primary care provider, Debra Larson, within 7 days to   evaluate medication change.  No follow up labs or test are needed.     Follow-up with Dr. Blackman in 7 to 10 days             Unresulted Labs Ordered in the Past 30 Days of this Admission     Date and Time Order Name Status Description    5/19/2023  1:19 PM Surgical Pathology Exam In process       These results will be followed up by surgeon    Discharge Disposition   Discharged to home  Condition at discharge: Stable    Hospital Course   Heidi Su is a 37 year old female with type 1 diabetes mellitus treated with insulin pump who presented with abrupt onset severe chest and abdominal pain and was taken emergently to the operating room on 5/19 due to concern for bowel perforation with free intraperitoneal air and surgical abdomen.  She was then admitted on 5/19/2023 to the ICU " postoperatively.      Type 1 diabetes mellitus treated chronically with insulin pump  Very mild DKA  Hemoglobin A1c was 8.9.  Insulin pump was discontinued preoperatively on 5/19 due to n.p.o. status and altered mentation perioperatively.  Prior to surgery she presented with mild metabolic acidosis with normal anion gap and blood sugar 300, but DKA was not suspected preoperatively.  Intraoperative blood sugars were well controlled.  Postoperatively she had transient very mild DKA but maintained normal anion gap with rapid resolution of DKA after starting insulin infusion postoperatively and she maintained good glycemic control on continuous insulin infusion per protocol.  As she recovered from illness and surgery and diet was subsequently advanced, transition was made from continuous IV insulin infusion to use of her insulin pump per her normal settings.  Outpatient follow-up with her PCP and endocrinologist for her diabetes was recommended.    Suspected perforated bowel with free intraperitoneal air and surgical abdomen, status post exploratory laparotomy 5/19  SIRS  Surgeon reported intraoperative findings suspicious for microperforation of ileum of small bowel that was treated surgically intraoperatively.  Patient presented with tachycardia, tachypnea, and leukocytosis concerning for SIRS.  She was treated with IV Zosyn during hospitalization, but antibiotics were discontinued at discharge per recommendation of surgery.  After investigation, sepsis was not suspected.    Hypokalemia  Hypophosphatemia  Developed new hypokalemia attributed to GI losses from nasogastric suction postoperatively and also developed hypophosphatemia while n.p.o. postoperatively.  Electrolyte disturbances were treated with supplementation per respective protocols with resolution and by discharge she was tolerating advancing diet.    Anemia unspecified type  Presented with normal hemoglobin which dropped from 14 in the ER to 10 on POD 1  and 9 on POD 2 concerning for an acute anemia.  Intraoperative EBL was 10 mL and other active bleeding was not clinically apparent postoperatively.  Dilutional anemia is possible, but acute blood loss anemia could not be completely excluded.  Outpatient follow-up of anemia was recommended.    Right calf saphenous vein thrombus  Aspirin-induced platelet defect  Patient was diagnosed with thrombus in the superficial right calf saphenous vein on May 2 and had been taking aspirin therapy since then.  Aspirin causes platelet function defect that can interfere with blood clotting and cause bleeding.  Active bleeding postoperatively was not clinically evident.  There were no acute issues regarding previous diagnosis of superficial right calf vein thrombus during this hospital stay.  Aspirin therapy was discontinued.    Depression with anxiety  ADHD  Outside medical records referred to previous diagnosis of chronic mental health problems including depression, anxiety, and ADHD for which she normally takes medication treatment.  Those chronic medications were held during hospitalization, but she was advised to restart those chronic medications after discharge.    Consultations This Hospital Stay   HOSPITALIST IP CONSULT  PHARMACY IP CONSULT  CARE MANAGEMENT / SOCIAL WORK IP CONSULT    Code Status   Full Code    Time Spent on this Encounter   I, Jcarlos Cho MD, personally saw the patient today and spent greater than 30 minutes discharging this patient.       Jcarlos Cho MD  [facility]  [address] [place] [address]  Phone: [phone]  ______________________________________________________________________    Physical Exam   Vital Signs: Temp: 98.9  F (37.2  C) Temp src: Oral BP: (!) 174/100 Pulse: 57   Resp: 18 SpO2: 98 % O2 Device: None (Room air)    Weight: 166 lbs 10.68 oz  General Appearance: No acute distress sitting up in a chair  Respiratory: Normal respiratory  effort, easily speaks full sentences  Cardiovascular: Regular rate and rhythm  Skin: Pale color  Neuro: Alert and maintains wakefulness and attention, no confusion       Primary Care Physician   Debra Larson    Discharge Orders      Reason for your hospital stay    Patient had exploratory laparotomy     Follow-up and recommended labs and tests     Follow up with primary care provider, Debra Larson, within 7 days to evaluate medication change.  No follow up labs or test are needed.   Follow-up with Dr. Blackman in 7 to 10 days     Activity    Your activity upon discharge: activity as tolerated and no heavy lifting for 2 weeks.  20 pound lifting restriction for 2 weeks     Diet    Follow this diet upon discharge: Regular       Significant Results and Procedures   Most Recent 3 CBC's:Recent Labs   Lab Test 05/22/23  0527 05/21/23  0522 05/20/23  2344 05/20/23  1939 05/20/23  0513 05/19/23  0708 05/19/23  0708 10/12/20  0026   WBC  --   --   --   --  8.8  --  15.1* 8.5   HGB 9.4* 9.4* 9.9*   < > 10.2*   < > 14.1 14.4   MCV  --   --   --   --  91  --  91 95     --   --   --  247  --  340 335    < > = values in this interval not displayed.     Most Recent 3 BMP's:Recent Labs   Lab Test 05/22/23  1135 05/22/23  0921 05/22/23  0802 05/22/23  0633 05/22/23  0527 05/21/23  1312 05/21/23  1255 05/21/23  0659 05/21/23  0522 05/21/23  0057 05/20/23  2344 05/20/23  0559 05/20/23  0513   NA  --   --   --   --  137  --   --   --  135*  --  137   < > 136   POTASSIUM  --   --   --   --  3.9  --  4.0  --  3.4  --  3.6   < > 3.2*   CHLORIDE  --   --   --   --  108*  --   --   --  108*  --  109*   < > 107   CO2  --   --   --   --  20*  --   --   --  19*  --  19*   < > 21*   BUN  --   --   --   --  3.6*  --   --   --  5.5*  --   --   --  6.5   CR  --   --   --   --  0.53  --   --   --  0.53  --   --   --  0.61   ANIONGAP  --   --   --   --  9  --   --   --  8  --  9   < > 8   NADIR  --   --   --   --  8.2*  --   --   --  7.8*  --    --   --  7.7*   * 190* 155*   < > 172*   < >  --    < > 173*   < >  --    < > 136*    < > = values in this interval not displayed.     Most Recent Hemoglobin A1c:Recent Labs   Lab Test 05/21/23  0522   A1C 8.9*     Most Recent 6 glucoses:Recent Labs   Lab Test 05/22/23  1135 05/22/23  0921 05/22/23  0802 05/22/23  0633 05/22/23  0527 05/22/23  0525   * 190* 155* 171* 172* 167*        Results for orders placed or performed during the hospital encounter of 05/19/23   CT Chest Pulmonary Embolism w Contrast    Narrative    CT CHEST PULMONARY EMBOLISM WITH CONTRAST May 19, 2023 9:06 AM    HISTORY: Sharp pleuritic type chest pain with elevated D-dimer.    TECHNIQUE: Scans obtained from the apices through the diaphragm with  IV contrast. ISOVUE-370, 70mL IV injected. Radiation dose for this  scan was reduced using automated exposure control, adjustment of the  mA and/or kV according to patient size, or iterative reconstruction  technique. 2D and 3D MIP reconstructions were performed by the CT  technologist    COMPARISON: Chest x-ray on 2/21/2017.    FINDINGS:  Chest/mediastinum: No evidence of pulmonary embolism. No cardiomegaly  or significant pericardial effusion. No significant mediastinal or  hilar lymphadenopathy. No significant atherosclerotic vascular  calcification of the coronary arteries.     Lungs and pleura: No pleural effusion or pneumothorax. Mild basilar  pulmonary opacities, likely atelectasis. Few scattered calcified  pulmonary granulomas.    Upper abdomen: Limited evaluation of the upper abdomen due to lack of  coverage. Large amount of free peritoneal air, likely related to bowel  perforation.    Bones and soft tissue: No suspicious osseous lesion.      Impression    IMPRESSION:   1. No evidence of pulmonary embolism.  2. Large amount of free peritoneal air, likely related to bowel  perforation.    Findings were discussed with the patient's provider Dr. NELL MEDINA at 9:20 AM on  5/19/2023.    LIZZ GASCA MD         SYSTEM ID:  [id number]   CT Abdomen Pelvis w Contrast    Narrative    CT ABDOMEN AND PELVIS WITH CONTRAST  5/19/2023 10:19 AM    HISTORY: Presenting with chest pain and upper abdominal pain.  Abdominal pain intensifying. CT chest identified free air in abdomen.    TECHNIQUE: CT scan obtained of the abdomen, and pelvis with IV  contrast. ISOVUE-370, 80 mL IV injected. Radiation dose for this scan  was reduced using automated exposure control, adjustment of the mA  and/or kV according to patient size, or iterative reconstruction  technique.    COMPARISON: CT abdomen and pelvis on 3/3/2021 and chest CT on same day  earlier.    FINDINGS:    Lower chest: Please refer to concurrently performed chest CT for  findings related to the lung bases.    Abdomen/pelvis:    Hepatobiliary: No suspicious focal hepatic lesion. The gallbladder is  unremarkable.    Pancreas: No main pancreatic ductal dilatation or definite solid  pancreatic mass.    Spleen: No splenomegaly.    Adrenal glands: No adrenal nodules.    Kidneys: Contrast within the renal collecting system, which precludes  evaluation for kidney stones.    Bowel: No abnormally dilated bowel loops. The appendix is visualized  and appears normal.    Peritoneum: Large amount of free peritoneal air, likely related to  perforated viscus, however, no definite source identified.    Pelvic organs: The uterus is not visualized, likely surgically absent.    Vascular: Unremarkable.    Lymph nodes: No significant abdominopelvic lymphadenopathy.    Bones and soft tissue: No suspicious osseous lesion.      Impression    IMPRESSION:   1. Large amount of free peritoneal air, likely related to perforated  viscus. No definite source identified, although there is significant  thinning of the anterior wall of the gastric body, which could  represent a potential source.    LIZZ GASCA MD         SYSTEM ID:  [id number]       Discharge Medications    Current Discharge Medication List      START taking these medications    Details   insulin aspart (NovoLOG) inject - to fill ambulatory pump by Device route See Admin Instructions      oxyCODONE (ROXICODONE) 5 MG tablet Take 1 tablet (5 mg) by mouth every 6 hours as needed for pain  Qty: 12 tablet, Refills: 0    Associated Diagnoses: Acute post-operative pain         CONTINUE these medications which have NOT CHANGED    Details   acetaminophen (TYLENOL) 500 MG tablet Take 500 mg by mouth      amphetamine-dextroamphetamine (ADDERALL) 15 MG tablet Take 15 mg by mouth daily At noon      blood glucose (CONTOUR NEXT TEST) test strip TEST 5- 6 TIMES/DAY, uncontrolled diabetes      Continuous Blood Gluc Sensor (DEXCOM G6 SENSOR) MISC USE AS DIRECTED TO CHECK BLOOD SUGARS. CHANGE EVERY 10 DAYS      Continuous Blood Gluc Transmit (DEXCOM G6 TRANSMITTER) MISC       famotidine (PEPCID) 20 MG tablet Take 20 mg by mouth 2 times daily      insulin aspart (NOVOLOG VIAL) 100 UNITS/ML vial Inject 60 Units Subcutaneous daily      Insulin Glargine-yfgn 100 UNIT/ML SOPN inject 24 units subq every 24 hours in case of pump failure      KETOSTIX test strip FOR PERSONAL USE IF DKA IS SUSPECTED.      QUEtiapine (SEROQUEL) 25 MG tablet Take 25 mg by mouth         STOP taking these medications       aspirin (ASA) 325 MG EC tablet Comments:   Reason for Stopping:         ibuprofen (ADVIL,MOTRIN) 200 MG tablet Comments:   Reason for Stopping:             Allergies   Allergies   Allergen Reactions     No Known Allergies

## 2023-05-22 NOTE — PROGRESS NOTES
S-(situation): Patient discharged to home via private car with mom.     B-(background): Surgical abdomen.     A-(assessment): Patient alert and oriented. Vital signs stable, slightly hypertensive. Independent in room.     R-(recommendations): Discharge instructions reviewed with patoemt. Listed belongings gathered and returned to patient.         Discharge Nursing Criteria:     Care Plan and Patient education resolved: Yes    New Medications- pt has been educated about purpose and side effects: Yes    MISC  Prescriptions if needed, hard copies sent with patient  NA  Home medications returned to patient: NA  Medication Bin checked and emptied on discharge Yes  Patient reports post-discharge pain management plan is effective: Yes

## 2023-05-22 NOTE — PROGRESS NOTES
Surgery POD #3    Subjective:  Pt is feeling better.   (+)Flatus, (+)BM.    On Clears.  Still insulin drip      Objective:    Vitals:    05/22/23 0500 05/22/23 0600 05/22/23 0656 05/22/23 0800   BP:   (!) 147/111    BP Location:       Pulse: 86 71 65    Resp: (!) 31 22 17    Temp:   98.3  F (36.8  C) 98.9  F (37.2  C)   TempSrc:   Oral Oral   SpO2:   98%    Weight:       Height:         Abd: Soft, non distended    Results for orders placed or performed during the hospital encounter of 05/19/23 (from the past 24 hour(s))   Glucose by meter   Result Value Ref Range    GLUCOSE BY METER POCT 106 (H) 70 - 99 mg/dL   Potassium   Result Value Ref Range    Potassium 4.0 3.4 - 5.3 mmol/L   Glucose by meter   Result Value Ref Range    GLUCOSE BY METER POCT 124 (H) 70 - 99 mg/dL   Glucose by meter   Result Value Ref Range    GLUCOSE BY METER POCT 163 (H) 70 - 99 mg/dL   Glucose by meter   Result Value Ref Range    GLUCOSE BY METER POCT 129 (H) 70 - 99 mg/dL   Glucose by meter   Result Value Ref Range    GLUCOSE BY METER POCT 83 70 - 99 mg/dL   Glucose by meter   Result Value Ref Range    GLUCOSE BY METER POCT 98 70 - 99 mg/dL   Glucose by meter   Result Value Ref Range    GLUCOSE BY METER POCT 108 (H) 70 - 99 mg/dL   Glucose by meter   Result Value Ref Range    GLUCOSE BY METER POCT 157 (H) 70 - 99 mg/dL   Glucose by meter   Result Value Ref Range    GLUCOSE BY METER POCT 137 (H) 70 - 99 mg/dL   Glucose by meter   Result Value Ref Range    GLUCOSE BY METER POCT 99 70 - 99 mg/dL   Glucose by meter   Result Value Ref Range    GLUCOSE BY METER POCT 140 (H) 70 - 99 mg/dL   Glucose by meter   Result Value Ref Range    GLUCOSE BY METER POCT 175 (H) 70 - 99 mg/dL   Glucose by meter   Result Value Ref Range    GLUCOSE BY METER POCT 174 (H) 70 - 99 mg/dL   Glucose by meter   Result Value Ref Range    GLUCOSE BY METER POCT 153 (H) 70 - 99 mg/dL   Glucose by meter   Result Value Ref Range    GLUCOSE BY METER POCT 139 (H) 70 - 99 mg/dL    Glucose by meter   Result Value Ref Range    GLUCOSE BY METER POCT 158 (H) 70 - 99 mg/dL   Glucose by meter   Result Value Ref Range    GLUCOSE BY METER POCT 167 (H) 70 - 99 mg/dL   Glucose by meter   Result Value Ref Range    GLUCOSE BY METER POCT 167 (H) 70 - 99 mg/dL   Platelet count   Result Value Ref Range    Platelet Count 239 150 - 450 10e3/uL   Phosphorus   Result Value Ref Range    Phosphorus 2.8 2.5 - 4.5 mg/dL   Hemoglobin   Result Value Ref Range    Hemoglobin 9.4 (L) 11.7 - 15.7 g/dL   Basic metabolic panel   Result Value Ref Range    Sodium 137 136 - 145 mmol/L    Potassium 3.9 3.4 - 5.3 mmol/L    Chloride 108 (H) 98 - 107 mmol/L    Carbon Dioxide (CO2) 20 (L) 22 - 29 mmol/L    Anion Gap 9 7 - 15 mmol/L    Urea Nitrogen 3.6 (L) 6.0 - 20.0 mg/dL    Creatinine 0.53 0.51 - 0.95 mg/dL    Calcium 8.2 (L) 8.6 - 10.0 mg/dL    Glucose 172 (H) 70 - 99 mg/dL    GFR Estimate >90 >60 mL/min/1.73m2   Blood gas venous   Result Value Ref Range    pH Venous 7.38 7.32 - 7.43    pCO2 Venous 36 (L) 40 - 50 mm Hg    pO2 Venous 68 (H) 25 - 47 mm Hg    Bicarbonate Venous 21 21 - 28 mmol/L    Base Excess/Deficit (+/-) -3.6 -7.7 - 1.9 mmol/L    FIO2 21    Ketone Beta-Hydroxybutyrate Quantitative   Result Value Ref Range    Ketone (Beta-Hydroxybutyrate) Quantitative 0.20 <=0.30 mmol/L   Glucose by meter   Result Value Ref Range    GLUCOSE BY METER POCT 171 (H) 70 - 99 mg/dL   Glucose by meter   Result Value Ref Range    GLUCOSE BY METER POCT 155 (H) 70 - 99 mg/dL   Glucose by meter   Result Value Ref Range    GLUCOSE BY METER POCT 190 (H) 70 - 99 mg/dL           Assessment/Plan:  Pt s/p ex lap for free air.  Ileus resolved. Bi clears.  Cont insulin as per hospitalist.  Will ADAT.  Can go home once BS well controlled off insulin drip.    Jesse Blackman MD, FACS

## 2023-05-22 NOTE — PROGRESS NOTES
Care Management Discharge Note    Discharge Date: 05/24/2023       Discharge Disposition: Home    Discharge Services: None    Discharge DME: None    Discharge Transportation: family or friend will provide    Private pay costs discussed: Not applicable    Does the patient's insurance plan have a 3 day qualifying hospital stay waiver?  No    Patient/family educated on Medicare website which has current facility and service quality ratings: no    Education Provided on the Discharge Plan:  Yes  Persons Notified of Discharge Plans: Patient  Patient/Family in Agreement with the Plan: yes    Handoff Referral Completed: Yes    Additional Information:  Per physician, patient is medically stable for discharge today.     Patient will discharge to home with family support. No needs from Care Management at this time.     Family will provide transportation to home.         ASAEL Aquino  Inpatient Care Coordinator   [facility] [phone]  [facility] [phone]

## 2023-05-22 NOTE — PROGRESS NOTES
Antimicrobial Stewardship Team Note    Antimicrobial Stewardship Program - A joint venture between [facility] and  Physicians to optimize antibiotic management.  NOT a formal consult - Restricted Antimicrobial Review     Patient: Heidi Su  MRN: [id number]  Allergies: No known allergies    Heidi Su is 37 YOF with PMH significant for T1DM and tobacco use. She presented initially the morning of 5/19 with the chief complaint of chest pain, but also developed worsening abdominal pain throughout the morning.    HPI  Workup included a CT chest which revealed a significant amount of intraperitoneal air without an obvious source. This was confirmed on an abdominal CT. She was started on zosyn and taken to the OR for diagnostic laparoscopy. Here they found the distal ileum had exudate with a serosal tear, but no obvious area of perforation. They were able to sew the tear up. The appendix was normal appearing, but full of appendicoliths. There was also concern of microperforation here, so they opted for appendectomy. The entire colon on palpation was normal, without evidence of any inflammation or perforation. She received NG decompression which was discontinued this morning. Initially, she presented with tachycardia, tachypnea, and leukocytosis concerning for SIRS. This morning 5/22, the patient feels much better with minimal pain. She remains afebrile, with no episodes of hypotension. CBC hasn't been drawn the past couple days, but her WBC was already much improved on 5/20, down to 8.8 from 15.1 on presentation.         Active Anti-infective Medications   (From admission, onward)                 Start     Stop    05/19/23 1700  piperacillin-tazobactam  3.375 g,   Intravenous,   EVERY 6 HOURS        Intra-Abdominal Infection        --                  Assessment: surgically managed ileal microperforation vs microperforated appendicitis.  While diagnosis is somewhat uncertain, the patient's  favorable clinical picture, including remaining afebrile with improvement of abdominal symptoms, leads me to believe that surgery was successful in addressing the source of infection. The STOP-IT trial examined the optimal duration of antibiotics for complicated intraabdominal infections which had undergone intervention to achieve source control and found that a 4-day fixed course of antibiotics had similar rates of infectious complications compared to treatment durations based on symptoms. Today is day four of antibiotic treatment with Zosyn. Based on this, the risks of continued antibiotic use (ADRs, CDI, antimicrobial resistance) outweigh the benefits of continued treatment.    Recommendations:  Discontinue Zosyn after 4 days of total treatment (5/23/23 at 2:00 pm, if timed based on surgery date).    Pharmacy took the following actions: Called/paged provider.    Discussed with ID Staff: Robin Burden MD and Ana Desir, PharmD, BCIDP    Juarez Barrientos, [facility]  Ph [phone]

## 2023-05-22 NOTE — PLAN OF CARE
Goal Outcome Evaluation:                    Patient progressing, tolerated NG removal today and feels much better since that is out. Has been walking around in her room, and in the halls, no nausea, has taken about 150 ml water with Miralax and sips with oral meds (oxycodone). Continues on and off the insulin drip. Took and extra blood sugar at patients request @1730 due to the one at 1700 was low at 83 (patient states when she gets that low, it is not uncommon for her to drop to 50 within about 10-20 minutes and she cannot always feel it), the 1730 was 98, and with the 1800 blood sugar we resumed the insulin drip.  Passing flatus.    Still awaiting surgeon to round today, patient is hoping to start some clear liquids. IV fluids infusing, potassium was replaced, re-check due in the a.m.

## 2023-05-22 NOTE — PLAN OF CARE
Problem: Plan of Care - These are the overarching goals to be used throughout the patient stay.    Goal: Plan of Care Review  Description: The Plan of Care Review/Shift note should be completed every shift.  The Outcome Evaluation is a brief statement about your assessment that the patient is improving, declining, or no change.  This information will be displayed automatically on your shift note.  Outcome: Progressing  Goal: Patient-Specific Goal (Individualized)  Description: Heidi will increase activity and ambulate 4 x per day and be up in a chair for part of the day by 5/22/2023 or by date of discharge.  Outcome: Progressing  Goal: Absence of Hospital-Acquired Illness or Injury  Outcome: Progressing  Intervention: Identify and Manage Fall Risk  Recent Flowsheet Documentation  Taken 5/22/2023 0000 by Sergio Montes RN  Safety Promotion/Fall Prevention:   activity supervised   nonskid shoes/slippers when out of bed   room organization consistent   safety round/check completed  Taken 5/21/2023 2000 by Sergio Montes RN  Safety Promotion/Fall Prevention:   activity supervised   nonskid shoes/slippers when out of bed   room organization consistent   safety round/check completed  Intervention: Prevent Skin Injury  Recent Flowsheet Documentation  Taken 5/22/2023 0000 by Sergio Montes RN  Body Position: position changed independently  Taken 5/21/2023 2000 by Sergio Montes RN  Body Position: position changed independently  Intervention: Prevent and Manage VTE (Venous Thromboembolism) Risk  Recent Flowsheet Documentation  Taken 5/22/2023 0000 by Sergio Montes RN  VTE Prevention/Management: SCDs (sequential compression devices) off  Taken 5/21/2023 2000 by Sergio Montes RN  VTE Prevention/Management: SCDs (sequential compression devices) off  Goal: Optimal Comfort and Wellbeing  Outcome: Progressing  Intervention: Monitor Pain and Promote Comfort  Recent Flowsheet Documentation  Taken 5/22/2023 0130 by Jyoti  AUBRIE Hill  Pain Management Interventions: medication (see MAR)  Taken 5/21/2023 2100 by Sergio Montes RN  Pain Management Interventions: medication (see MAR)  Goal: Readiness for Transition of Care  Outcome: Progressing     Problem: Diabetes Comorbidity  Goal: Blood Glucose Level Within Targeted Range  Outcome: Progressing     Problem: Pain Chronic (Persistent) (Comorbidity Management)  Goal: Acceptable Pain Control and Functional Ability  Outcome: Progressing  Intervention: Develop Pain Management Plan  Recent Flowsheet Documentation  Taken 5/22/2023 0130 by Sergio Montes RN  Pain Management Interventions: medication (see MAR)  Taken 5/21/2023 2100 by Sergio Montes RN  Pain Management Interventions: medication (see MAR)  Intervention: Manage Persistent Pain  Recent Flowsheet Documentation  Taken 5/22/2023 0000 by Sergio Montes RN  Medication Review/Management: medications reviewed  Taken 5/21/2023 2000 by Sergio Montes RN  Medication Review/Management: medications reviewed     Problem: Diabetic Ketoacidosis  Goal: Optimal Coping  Outcome: Progressing  Goal: Fluid and Electrolyte Balance with Absence of Ketosis  Outcome: Progressing       Insulin infusion continues. Blood sugars remain stable this shift ranging from , Pt was seen by Dr. Lizama and her diet was advanced at that time and is now tolerating clear liquid diet with a small amount of fluids.   Surgical site dressing is clean, dry, and intact, Oxycodone given for pain and Pt has been able to sleep.   Bowel sounds are active. Vitals are stable, telemetry shows a sinus rhythm this morning with a rate around 80.  Will continue to monitor and titrate insulin infusion as able and follow plan of care.

## 2023-05-23 LAB
PATH REPORT.COMMENTS IMP SPEC: NORMAL
PATH REPORT.COMMENTS IMP SPEC: NORMAL
PATH REPORT.FINAL DX SPEC: NORMAL
PATH REPORT.GROSS SPEC: NORMAL
PATH REPORT.MICROSCOPIC SPEC OTHER STN: NORMAL
PATH REPORT.RELEVANT HX SPEC: NORMAL
PHOTO IMAGE: NORMAL

## 2023-05-24 ENCOUNTER — PATIENT OUTREACH (OUTPATIENT)
Dept: CARE COORDINATION | Facility: CLINIC | Age: 38
End: 2023-05-24
Payer: COMMERCIAL

## 2023-06-01 ENCOUNTER — OFFICE VISIT (OUTPATIENT)
Dept: SURGERY | Facility: CLINIC | Age: 38
End: 2023-06-01
Payer: COMMERCIAL

## 2023-06-01 ENCOUNTER — TELEPHONE (OUTPATIENT)
Dept: SURGERY | Facility: CLINIC | Age: 38
End: 2023-06-01

## 2023-06-01 VITALS
TEMPERATURE: 96.9 F | BODY MASS INDEX: 26.79 KG/M2 | SYSTOLIC BLOOD PRESSURE: 128 MMHG | DIASTOLIC BLOOD PRESSURE: 88 MMHG | WEIGHT: 166 LBS

## 2023-06-01 DIAGNOSIS — Z98.890 POST-OPERATIVE STATE: Primary | ICD-10-CM

## 2023-06-01 PROCEDURE — 99024 POSTOP FOLLOW-UP VISIT: CPT | Performed by: SPECIALIST

## 2023-06-01 ASSESSMENT — PAIN SCALES - GENERAL: PAINLEVEL: MODERATE PAIN (5)

## 2023-06-01 NOTE — TELEPHONE ENCOUNTER
Forms completed to the best of writer's knowledge. Copy sent to scanning, copy placed in file at upper level nurses station. Original given to patient's mother Yesenia Su as directed by patient.    Angelina Hartley RN on 6/1/2023 at 3:31 PM

## 2023-06-01 NOTE — PROGRESS NOTES
Follow-up for exploratory laparotomy for free air.    Subjective:  Patient is feeling better.  Still some upper midline incision pain in the morning.  Denies fevers chills.  She is passing gas moving her bowels well.    Objective:  B/P: 128/88, T: 96.9, P: Data Unavailable, R: Data Unavailable  Abdomen: Incision healing well.  Staples removed.    Path: Mild superficial acute appendicitis with with associate with fecalith.      Assessment/plan:  This is a 38-year-old lady status post an ex lap for a large amount of free air of unclear etiology.  She appears to have had a mild appendicitis associate with a fecalith found at the time of operation.  The plan at this time is just to gradual increase her activity as tolerated and follow-up with us as necessary.    Jesse Blackman MD, FACS

## 2023-06-01 NOTE — TELEPHONE ENCOUNTER
Reason for Call:  Form, our goal is to have forms completed with 72 hours, however, some forms may require a visit or additional information.    Type of letter, form or note:  disability    Who is the form from?: Insurance comp    Where did the form come from: Patient or family brought in       What clinic location was the form placed at?: [facility]    Where the form was placed: Given to MA/RN    What number is listed as a contact on the form?: Yesenia Su will  forms per patient, she can be reached at [phone]       Additional comments: none    Call taken on 6/1/2023 at 11:42 AM by Angelina Hartley RN

## 2023-06-01 NOTE — LETTER
6/1/2023         RE: Heidi Su  [address]  [place] [address]        Dear Colleague,    Thank you for referring your patient, Heidi Su, to the [facility]. Please see a copy of my visit note below.    Follow-up for exploratory laparotomy for free air.    Subjective:  Patient is feeling better.  Still some upper midline incision pain in the morning.  Denies fevers chills.  She is passing gas moving her bowels well.    Objective:  B/P: 128/88, T: 96.9, P: Data Unavailable, R: Data Unavailable  Abdomen: Incision healing well.  Staples removed.    Path: Mild superficial acute appendicitis with with associate with fecalith.      Assessment/plan:  This is a 38-year-old lady status post an ex lap for a large amount of free air of unclear etiology.  She appears to have had a mild appendicitis associate with a fecalith found at the time of operation.  The plan at this time is just to gradual increase her activity as tolerated and follow-up with us as necessary.    Jesse Blackman MD, FACS      Again, thank you for allowing me to participate in the care of your patient.        Sincerely,        Jesse Blackman MD

## 2023-06-18 ENCOUNTER — HOSPITAL ENCOUNTER (EMERGENCY)
Facility: CLINIC | Age: 38
Discharge: HOME | End: 2023-06-18
Attending: EMERGENCY MEDICINE | Admitting: EMERGENCY MEDICINE
Payer: COMMERCIAL

## 2023-06-18 ENCOUNTER — APPOINTMENT (OUTPATIENT)
Dept: CT IMAGING | Facility: CLINIC | Age: 38
End: 2023-06-18
Attending: EMERGENCY MEDICINE
Payer: COMMERCIAL

## 2023-06-18 VITALS
RESPIRATION RATE: 36 BRPM | TEMPERATURE: 98 F | SYSTOLIC BLOOD PRESSURE: 132 MMHG | HEART RATE: 102 BPM | DIASTOLIC BLOOD PRESSURE: 100 MMHG | OXYGEN SATURATION: 100 %

## 2023-06-18 DIAGNOSIS — R07.89 ATYPICAL CHEST PAIN: ICD-10-CM

## 2023-06-18 LAB
ALBUMIN SERPL BCG-MCNC: 4.3 G/DL (ref 3.5–5.2)
ALP SERPL-CCNC: 114 U/L (ref 35–104)
ALT SERPL W P-5'-P-CCNC: 18 U/L (ref 0–50)
ANION GAP SERPL CALCULATED.3IONS-SCNC: 15 MMOL/L (ref 7–15)
AST SERPL W P-5'-P-CCNC: 17 U/L (ref 0–45)
BASOPHILS # BLD AUTO: 0 10E3/UL (ref 0–0.2)
BASOPHILS NFR BLD AUTO: 1 %
BILIRUB SERPL-MCNC: 0.5 MG/DL
BUN SERPL-MCNC: 9.9 MG/DL (ref 6–20)
CALCIUM SERPL-MCNC: 9 MG/DL (ref 8.6–10)
CHLORIDE SERPL-SCNC: 97 MMOL/L (ref 98–107)
CREAT SERPL-MCNC: 0.69 MG/DL (ref 0.51–0.95)
DEPRECATED HCO3 PLAS-SCNC: 20 MMOL/L (ref 22–29)
EOSINOPHIL # BLD AUTO: 0.1 10E3/UL (ref 0–0.7)
EOSINOPHIL NFR BLD AUTO: 2 %
ERYTHROCYTE [DISTWIDTH] IN BLOOD BY AUTOMATED COUNT: 14.5 % (ref 10–15)
GFR SERPL CREATININE-BSD FRML MDRD: >90 ML/MIN/1.73M2
GLUCOSE SERPL-MCNC: 308 MG/DL (ref 70–99)
HCT VFR BLD AUTO: 36.6 % (ref 35–47)
HGB BLD-MCNC: 12.2 G/DL (ref 11.7–15.7)
IMM GRANULOCYTES # BLD: 0 10E3/UL
IMM GRANULOCYTES NFR BLD: 0 %
LYMPHOCYTES # BLD AUTO: 1.9 10E3/UL (ref 0.8–5.3)
LYMPHOCYTES NFR BLD AUTO: 40 %
MCH RBC QN AUTO: 30 PG (ref 26.5–33)
MCHC RBC AUTO-ENTMCNC: 33.3 G/DL (ref 31.5–36.5)
MCV RBC AUTO: 90 FL (ref 78–100)
MONOCYTES # BLD AUTO: 0.3 10E3/UL (ref 0–1.3)
MONOCYTES NFR BLD AUTO: 7 %
NEUTROPHILS # BLD AUTO: 2.4 10E3/UL (ref 1.6–8.3)
NEUTROPHILS NFR BLD AUTO: 50 %
NRBC # BLD AUTO: 0 10E3/UL
NRBC BLD AUTO-RTO: 0 /100
PLATELET # BLD AUTO: 303 10E3/UL (ref 150–450)
POTASSIUM SERPL-SCNC: 3.8 MMOL/L (ref 3.4–5.3)
PROT SERPL-MCNC: 7.5 G/DL (ref 6.4–8.3)
RBC # BLD AUTO: 4.07 10E6/UL (ref 3.8–5.2)
SODIUM SERPL-SCNC: 132 MMOL/L (ref 136–145)
TROPONIN T SERPL HS-MCNC: <6 NG/L
WBC # BLD AUTO: 4.7 10E3/UL (ref 4–11)

## 2023-06-18 PROCEDURE — 99285 EMERGENCY DEPT VISIT HI MDM: CPT | Mod: 25 | Performed by: EMERGENCY MEDICINE

## 2023-06-18 PROCEDURE — 74177 CT ABD & PELVIS W/CONTRAST: CPT

## 2023-06-18 PROCEDURE — 99284 EMERGENCY DEPT VISIT MOD MDM: CPT | Mod: 25 | Performed by: EMERGENCY MEDICINE

## 2023-06-18 PROCEDURE — 93010 ELECTROCARDIOGRAM REPORT: CPT | Performed by: EMERGENCY MEDICINE

## 2023-06-18 PROCEDURE — 36415 COLL VENOUS BLD VENIPUNCTURE: CPT | Performed by: EMERGENCY MEDICINE

## 2023-06-18 PROCEDURE — 85025 COMPLETE CBC W/AUTO DIFF WBC: CPT | Performed by: EMERGENCY MEDICINE

## 2023-06-18 PROCEDURE — 93005 ELECTROCARDIOGRAM TRACING: CPT | Performed by: EMERGENCY MEDICINE

## 2023-06-18 PROCEDURE — 84484 ASSAY OF TROPONIN QUANT: CPT | Performed by: EMERGENCY MEDICINE

## 2023-06-18 PROCEDURE — 250N000011 HC RX IP 250 OP 636: Performed by: EMERGENCY MEDICINE

## 2023-06-18 PROCEDURE — 80053 COMPREHEN METABOLIC PANEL: CPT | Performed by: EMERGENCY MEDICINE

## 2023-06-18 PROCEDURE — 250N000009 HC RX 250: Performed by: EMERGENCY MEDICINE

## 2023-06-18 RX ORDER — IOPAMIDOL 755 MG/ML
500 INJECTION, SOLUTION INTRAVASCULAR ONCE
Status: COMPLETED | OUTPATIENT
Start: 2023-06-18 | End: 2023-06-18

## 2023-06-18 RX ADMIN — SODIUM CHLORIDE 70 ML: 9 INJECTION, SOLUTION INTRAVENOUS at 18:20

## 2023-06-18 RX ADMIN — IOPAMIDOL 80 ML: 755 INJECTION, SOLUTION INTRAVENOUS at 18:20

## 2023-06-18 ASSESSMENT — ACTIVITIES OF DAILY LIVING (ADL): ADLS_ACUITY_SCORE: 35

## 2023-06-18 NOTE — ED PROVIDER NOTES
History     Chief Complaint   Patient presents with     Chest Pain     HPI  Heidi Su is a 38 year old female who presents with left-sided chest pain.  This began an hour ago.  Pain is sharp.  Pain radiates down her arm and her left leg she states.  Also with some abdominal pain that seem to start before this.  She reports a recent appendectomy and other abdominal surgical process.  No vomiting.  No fever.  No history of heart disease.  No recent chest trauma.  No fever, no cough.    Allergies:  Allergies   Allergen Reactions     No Known Allergies        Problem List:    Patient Active Problem List    Diagnosis Date Noted     Hypophosphatemia 05/21/2023     Priority: Medium     Hypokalemia 05/20/2023     Priority: Medium     Anemia, unspecified type 05/20/2023     Priority: Medium     Surgical abdomen 05/19/2023     Priority: Medium     Free intraperitoneal air 05/19/2023     Priority: Medium     Type 1 diabetes mellitus with hyperglycemia (H) 05/19/2023     Priority: Medium     Insulin pump status 05/19/2023     Priority: Medium     ADHD (attention deficit hyperactivity disorder) 05/19/2023     Priority: Medium     Depression with anxiety 05/19/2023     Priority: Medium     SIRS (systemic inflammatory response syndrome) (H) 05/19/2023     Priority: Medium     Diabetic ketoacidosis without coma associated with type 1 diabetes mellitus (H) 05/19/2023     Priority: Medium     Thrombosis of right saphenous vein 05/19/2023     Priority: Medium     CARDIOVASCULAR SCREENING; LDL GOAL LESS THAN 160 04/03/2012     Priority: Medium        Past Medical History:    Past Medical History:   Diagnosis Date     Anxiety      Bronchitis      Depressive disorder      Diabetes (H)      NO ACTIVE PROBLEMS      Ovarian cyst        Past Surgical History:    Past Surgical History:   Procedure Laterality Date     APPENDECTOMY OPEN N/A 5/19/2023    Procedure: Open Appendectomy;  Surgeon: Jesse Blackman MD;  Location:  OR      CYSTECTOMY OVARIAN BENIGN       DILATION AND CURETTAGE       DILATION AND CURETTAGE  2011     GYN SURGERY       HEMORRHOID SURGERY       LAPAROSCOPY DIAGNOSTIC (GENERAL) N/A 5/19/2023    Procedure: LAPAROSCOPY, DIAGNOSTIC;  Surgeon: Jesse Blackman MD;  Location: [facility] OR     LAPAROTOMY EXPLORATORY N/A 5/19/2023    Procedure: LAPAROTOMY;  Surgeon: Jesse Blackman MD;  Location: [facility] OR       Family History:    Family History   Problem Relation Age of Onset     Cerebrovascular Disease Maternal Grandfather      Breast Cancer Maternal Grandfather      Asthma Sister      Diabetes Sister 14       Social History:  Marital Status:  Single [1]  Social History     Tobacco Use     Smoking status: Every Day     Packs/day: 0.25     Types: Cigarettes     Smokeless tobacco: Never   Substance Use Topics     Alcohol use: Yes     Comment: 1-2x/month     Drug use: No        Medications:    acetaminophen (TYLENOL) 500 MG tablet  amphetamine-dextroamphetamine (ADDERALL) 15 MG tablet  blood glucose (CONTOUR NEXT TEST) test strip  Continuous Blood Gluc Sensor (DEXCOM G6 SENSOR) MISC  Continuous Blood Gluc Transmit (DEXCOM G6 TRANSMITTER) MISC  famotidine (PEPCID) 20 MG tablet  insulin aspart (NOVOLOG VIAL) 100 UNITS/ML vial  Insulin Glargine-yfgn 100 UNIT/ML SOPN  KETOSTIX test strip  QUEtiapine (SEROQUEL) 25 MG tablet          Review of Systems  All other systems are reviewed and are negative    Physical Exam   BP: (!) 136/99  Pulse: 104  Temp: 98  F (36.7  C)  Resp: 14  SpO2: 100 %      Physical Exam  Vitals and nursing note reviewed.   Constitutional:       General: She is not in acute distress.     Appearance: She is well-developed. She is not diaphoretic.   HENT:      Head: Normocephalic and atraumatic.   Eyes:      General: No scleral icterus.     Pupils: Pupils are equal, round, and reactive to light.   Cardiovascular:      Rate and Rhythm: Regular rhythm. Tachycardia present.      Heart sounds: Normal heart sounds. No murmur  heard.  Pulmonary:      Effort: No respiratory distress.      Breath sounds: No stridor. No wheezing or rales.   Abdominal:      Palpations: Abdomen is soft.      Tenderness: There is no abdominal tenderness.   Musculoskeletal:         General: No tenderness.      Cervical back: Normal range of motion and neck supple.   Skin:     General: Skin is warm and dry.      Coloration: Skin is not pale.      Findings: No erythema or rash.   Neurological:      Mental Status: She is alert.         ED Course                 Procedures         EKG reveals sinus rhythm at 99 bpm.  No acute ST segment or T wave changes noted.  Interpreted by myself.           Results for orders placed or performed during the hospital encounter of 06/18/23 (from the past 24 hour(s))   CBC with platelets differential    Narrative    The following orders were created for panel order CBC with platelets differential.  Procedure                               Abnormality         Status                     ---------                               -----------         ------                     CBC with platelets and d...[[id number]]                      Final result                 Please view results for these tests on the individual orders.   Comprehensive metabolic panel   Result Value Ref Range    Sodium 132 (L) 136 - 145 mmol/L    Potassium 3.8 3.4 - 5.3 mmol/L    Chloride 97 (L) 98 - 107 mmol/L    Carbon Dioxide (CO2) 20 (L) 22 - 29 mmol/L    Anion Gap 15 7 - 15 mmol/L    Urea Nitrogen 9.9 6.0 - 20.0 mg/dL    Creatinine 0.69 0.51 - 0.95 mg/dL    Calcium 9.0 8.6 - 10.0 mg/dL    Glucose 308 (H) 70 - 99 mg/dL    Alkaline Phosphatase 114 (H) 35 - 104 U/L    AST 17 0 - 45 U/L    ALT 18 0 - 50 U/L    Protein Total 7.5 6.4 - 8.3 g/dL    Albumin 4.3 3.5 - 5.2 g/dL    Bilirubin Total 0.5 <=1.2 mg/dL    GFR Estimate >90 >60 mL/min/1.73m2   Troponin T, High Sensitivity   Result Value Ref Range    Troponin T, High Sensitivity <6 <=14 ng/L   CBC with platelets and  differential   Result Value Ref Range    WBC Count 4.7 4.0 - 11.0 10e3/uL    RBC Count 4.07 3.80 - 5.20 10e6/uL    Hemoglobin 12.2 11.7 - 15.7 g/dL    Hematocrit 36.6 35.0 - 47.0 %    MCV 90 78 - 100 fL    MCH 30.0 26.5 - 33.0 pg    MCHC 33.3 31.5 - 36.5 g/dL    RDW 14.5 10.0 - 15.0 %    Platelet Count 303 150 - 450 10e3/uL    % Neutrophils 50 %    % Lymphocytes 40 %    % Monocytes 7 %    % Eosinophils 2 %    % Basophils 1 %    % Immature Granulocytes 0 %    NRBCs per 100 WBC 0 <1 /100    Absolute Neutrophils 2.4 1.6 - 8.3 10e3/uL    Absolute Lymphocytes 1.9 0.8 - 5.3 10e3/uL    Absolute Monocytes 0.3 0.0 - 1.3 10e3/uL    Absolute Eosinophils 0.1 0.0 - 0.7 10e3/uL    Absolute Basophils 0.0 0.0 - 0.2 10e3/uL    Absolute Immature Granulocytes 0.0 <=0.4 10e3/uL    Absolute NRBCs 0.0 10e3/uL   CT Chest (PE) Abdomen Pelvis w Contrast    Narrative    EXAM: CT CHEST PE ABDOMEN PELVIS W CONTRAST  LOCATION: [facility]  DATE: 6/18/2023    INDICATION: Left-sided chest pain.  COMPARISON: CT abdomen pelvis 05/19/2023. CT chest 05/19/2023.  TECHNIQUE: CT chest pulmonary angiogram and routine CT abdomen pelvis with IV contrast. Arterial phase through the chest and venous phase through the abdomen and pelvis. Multiplanar reformats and MIP reconstructions were performed. Dose reduction   techniques were used.   CONTRAST: Isovue 370,  80mL    FINDINGS:  ANGIOGRAM CHEST: Pulmonary arteries are normal caliber and negative for pulmonary emboli. Thoracic aorta is negative for dissection.    LUNGS AND PLEURA: Dependent subsegmental atelectasis. No consolidations or pleural effusions. A 3 mm solid nodule in the left apex (6/#47) and a 2 mm nodule in the right lower lobe (6/#166) are unchanged.    MEDIASTINUM/AXILLAE: Normal cardiac size. No pericardial effusion. No enlarged thoracic lymph node.    CORONARY ARTERY CALCIFICATION: None.    HEPATOBILIARY: Normal.    PANCREAS: Normal.    SPLEEN:  Normal.    ADRENAL GLANDS: Normal.    KIDNEYS/BLADDER: Normal.    BOWEL: No obstruction or inflammation. Appendix is absent. Scattered noninflamed colonic diverticuli.    LYMPH NODES: No enlarged lymph nodes.    VASCULATURE: Patent portal, splenic, and superior mesenteric veins. Mild noncalcified aortic atherosclerotic plaque. No abdominal aortic aneurysm.    PELVIC ORGANS: Absent uterus.    MUSCULOSKELETAL: Mild multilevel degenerative changes of the spine. No acute bony abnormality. Postsurgical scarring within the midline ventral supraumbilical fat. No fluid collections.      Impression    IMPRESSION:    1.  No pulmonary embolism or other acute intrathoracic abnormality.    2.  No acute abnormality in the abdomen or pelvis.    3.  No specific cause of the patient's symptoms is identified.       Medications   iopamidol (ISOVUE-370) solution 500 mL (80 mLs Intravenous $Given 6/18/23 1820)   sodium chloride 0.9 % bag 100ml for CT scan flush use (70 mLs As instructed $Given 6/18/23 1820)       Assessments & Plan (with Medical Decision Making)  38-year-old female who presented after recent abdominal surgery with some abdominal pain that radiated into her chest with also some left arm discomfort and numbness down her left leg.  No evidence for acute emergency medical condition.  CT chest, abdomen and pelvis revealed no pulmonary embolism or other worrisome finding.  Troponin normal.  EKG without acute finding.  She appears stable for discharge home.  Reassurance given.  Have recommended follow-up in clinic in the next week.  Return anytime sooner if condition worsens or any other concern     I have reviewed the nursing notes.    I have reviewed the findings, diagnosis, plan and need for follow up with the patient.        New Prescriptions    No medications on file       Final diagnoses:   Atypical chest pain       6/18/2023   [facility] EMERGENCY DEPT     Benjamín Pulliam MD  06/18/23 1925

## 2023-06-18 NOTE — MEDICATION SCRIBE - ADMISSION MEDICATION HISTORY
Medication Scribe Admission Medication History    Admission medication history is complete. The information provided in this note is only as accurate as the sources available at the time of the update.    Medication reconciliation/reorder completed by provider prior to medication history? No    Information Source(s): Patient via in-person    Pertinent Information: n/a    Changes made to PTA medication list:    Added: None    Deleted: None    Changed: None    Medication Affordability:  Not including over the counter (OTC) medications, was there a time in the past 3 months when you did not take your medications as prescribed because of cost?: No    Allergies reviewed with patient and updates made in EHR: yes    Medication History Completed By: MERVIN JIMENEZ 6/18/2023 6:06 PM    Prior to Admission medications    Medication Sig Last Dose Taking? Auth Provider Long Term End Date   acetaminophen (TYLENOL) 500 MG tablet Take 500 mg by mouth every 4 hours as needed for mild pain 6/18/2023 at am Yes Reported, Patient     amphetamine-dextroamphetamine (ADDERALL) 15 MG tablet Take 15 mg by mouth daily At noon 6/18/2023 at am Yes Reported, Patient     blood glucose (CONTOUR NEXT TEST) test strip TEST 5- 6 TIMES/DAY, uncontrolled diabetes 6/18/2023 at 1800 #250 Yes Reported, Patient     Continuous Blood Gluc Sensor (DEXCOM G6 SENSOR) MISC USE AS DIRECTED TO CHECK BLOOD SUGARS. CHANGE EVERY 10 DAYS  at abd Yes Reported, Patient     Continuous Blood Gluc Transmit (DEXCOM G6 TRANSMITTER) MISC   at abd Yes Reported, Patient     famotidine (PEPCID) 20 MG tablet Take 20 mg by mouth 2 times daily 6/18/2023 at am Yes Reported, Patient     insulin aspart (NOVOLOG VIAL) 100 UNITS/ML vial Inject Subcutaneous daily Maximum 60 units daily in pump  at current Yes Reported, Patient Yes    Insulin Glargine-yfgn 100 UNIT/ML SOPN inject 24 units subq every 24 hours in case of pump failure  at n/a Yes Reported, Patient     KETOSTIX test strip FOR  PERSONAL USE IF DKA IS SUSPECTED. Unknown at n/a Yes Reported, Patient     QUEtiapine (SEROQUEL) 25 MG tablet Take 25 mg by mouth At Bedtime 6/17/2023 at  Yes Reported, Patient Yes

## 2023-06-18 NOTE — ED TRIAGE NOTES
"Here with left sided chest pain that started about an hour ago. States she is having vision issues \"I am having trouble focusing\".      Triage Assessment       Row Name 06/18/23 7339       Triage Assessment (Adult)    Airway WDL WDL       Respiratory WDL    Respiratory WDL WDL       Skin Circulation/Temperature WDL    Skin Circulation/Temperature WDL WDL       Cardiac WDL    Cardiac WDL chest pain       Chest Pain Assessment    Chest Pain Location anterior chest, left    Chest Pain Radiation arm    Character aching;pressure;sharp                  "

## 2023-06-18 NOTE — ED TRIAGE NOTES
Triage Assessment     Row Name 06/18/23 4822       Triage Assessment (Adult)    Airway WDL WDL       Respiratory WDL    Respiratory WDL WDL       Skin Circulation/Temperature WDL    Skin Circulation/Temperature WDL WDL       Cardiac WDL    Cardiac WDL chest pain       Chest Pain Assessment    Chest Pain Location anterior chest, left    Chest Pain Radiation arm    Character aching;pressure;sharp

## 2023-06-24 ENCOUNTER — APPOINTMENT (OUTPATIENT)
Dept: CT IMAGING | Facility: CLINIC | Age: 38
End: 2023-06-24
Attending: NURSE PRACTITIONER
Payer: COMMERCIAL

## 2023-06-24 ENCOUNTER — HOSPITAL ENCOUNTER (EMERGENCY)
Facility: CLINIC | Age: 38
Discharge: HOME | End: 2023-06-24
Attending: NURSE PRACTITIONER | Admitting: NURSE PRACTITIONER
Payer: COMMERCIAL

## 2023-06-24 VITALS
TEMPERATURE: 97.5 F | HEART RATE: 60 BPM | WEIGHT: 166 LBS | DIASTOLIC BLOOD PRESSURE: 103 MMHG | SYSTOLIC BLOOD PRESSURE: 144 MMHG | BODY MASS INDEX: 26.79 KG/M2 | RESPIRATION RATE: 16 BRPM | OXYGEN SATURATION: 96 %

## 2023-06-24 DIAGNOSIS — R10.11 ABDOMINAL PAIN, RIGHT UPPER QUADRANT: ICD-10-CM

## 2023-06-24 LAB
ALBUMIN SERPL BCG-MCNC: 3.8 G/DL (ref 3.5–5.2)
ALBUMIN UR-MCNC: NEGATIVE MG/DL
ALP SERPL-CCNC: 88 U/L (ref 35–104)
ALT SERPL W P-5'-P-CCNC: 15 U/L (ref 0–50)
ANION GAP SERPL CALCULATED.3IONS-SCNC: 12 MMOL/L (ref 7–15)
APPEARANCE UR: CLEAR
AST SERPL W P-5'-P-CCNC: 14 U/L (ref 0–45)
BASOPHILS # BLD AUTO: 0 10E3/UL (ref 0–0.2)
BASOPHILS NFR BLD AUTO: 1 %
BILIRUB DIRECT SERPL-MCNC: <0.2 MG/DL (ref 0–0.3)
BILIRUB SERPL-MCNC: 0.2 MG/DL
BILIRUB UR QL STRIP: NEGATIVE
BUN SERPL-MCNC: 14.4 MG/DL (ref 6–20)
CALCIUM SERPL-MCNC: 8.6 MG/DL (ref 8.6–10)
CHLORIDE SERPL-SCNC: 103 MMOL/L (ref 98–107)
COLOR UR AUTO: ABNORMAL
CREAT SERPL-MCNC: 0.59 MG/DL (ref 0.51–0.95)
DEPRECATED HCO3 PLAS-SCNC: 21 MMOL/L (ref 22–29)
EOSINOPHIL # BLD AUTO: 0.2 10E3/UL (ref 0–0.7)
EOSINOPHIL NFR BLD AUTO: 5 %
ERYTHROCYTE [DISTWIDTH] IN BLOOD BY AUTOMATED COUNT: 14.6 % (ref 10–15)
GFR SERPL CREATININE-BSD FRML MDRD: >90 ML/MIN/1.73M2
GLUCOSE SERPL-MCNC: 167 MG/DL (ref 70–99)
GLUCOSE UR STRIP-MCNC: 50 MG/DL
HCT VFR BLD AUTO: 34.8 % (ref 35–47)
HGB BLD-MCNC: 11.6 G/DL (ref 11.7–15.7)
HGB UR QL STRIP: NEGATIVE
IMM GRANULOCYTES # BLD: 0 10E3/UL
IMM GRANULOCYTES NFR BLD: 0 %
KETONES UR STRIP-MCNC: NEGATIVE MG/DL
LEUKOCYTE ESTERASE UR QL STRIP: NEGATIVE
LIPASE SERPL-CCNC: 43 U/L (ref 13–60)
LYMPHOCYTES # BLD AUTO: 1.6 10E3/UL (ref 0.8–5.3)
LYMPHOCYTES NFR BLD AUTO: 45 %
MCH RBC QN AUTO: 30.1 PG (ref 26.5–33)
MCHC RBC AUTO-ENTMCNC: 33.3 G/DL (ref 31.5–36.5)
MCV RBC AUTO: 90 FL (ref 78–100)
MONOCYTES # BLD AUTO: 0.3 10E3/UL (ref 0–1.3)
MONOCYTES NFR BLD AUTO: 8 %
MUCOUS THREADS #/AREA URNS LPF: PRESENT /LPF
NEUTROPHILS # BLD AUTO: 1.5 10E3/UL (ref 1.6–8.3)
NEUTROPHILS NFR BLD AUTO: 41 %
NITRATE UR QL: NEGATIVE
NRBC # BLD AUTO: 0 10E3/UL
NRBC BLD AUTO-RTO: 0 /100
PH UR STRIP: 5 [PH] (ref 5–7)
PLATELET # BLD AUTO: 283 10E3/UL (ref 150–450)
POTASSIUM SERPL-SCNC: 4.1 MMOL/L (ref 3.4–5.3)
PROT SERPL-MCNC: 6.6 G/DL (ref 6.4–8.3)
RBC # BLD AUTO: 3.85 10E6/UL (ref 3.8–5.2)
RBC URINE: <1 /HPF
SODIUM SERPL-SCNC: 136 MMOL/L (ref 136–145)
SP GR UR STRIP: 1.01 (ref 1–1.03)
SQUAMOUS EPITHELIAL: 4 /HPF
UROBILINOGEN UR STRIP-MCNC: NORMAL MG/DL
WBC # BLD AUTO: 3.6 10E3/UL (ref 4–11)
WBC URINE: <1 /HPF

## 2023-06-24 PROCEDURE — 250N000009 HC RX 250: Performed by: NURSE PRACTITIONER

## 2023-06-24 PROCEDURE — 96361 HYDRATE IV INFUSION ADD-ON: CPT | Performed by: NURSE PRACTITIONER

## 2023-06-24 PROCEDURE — 36415 COLL VENOUS BLD VENIPUNCTURE: CPT | Performed by: NURSE PRACTITIONER

## 2023-06-24 PROCEDURE — 85025 COMPLETE CBC W/AUTO DIFF WBC: CPT | Performed by: NURSE PRACTITIONER

## 2023-06-24 PROCEDURE — 250N000011 HC RX IP 250 OP 636: Mod: JZ | Performed by: NURSE PRACTITIONER

## 2023-06-24 PROCEDURE — 83690 ASSAY OF LIPASE: CPT | Performed by: NURSE PRACTITIONER

## 2023-06-24 PROCEDURE — 258N000003 HC RX IP 258 OP 636: Performed by: NURSE PRACTITIONER

## 2023-06-24 PROCEDURE — 96375 TX/PRO/DX INJ NEW DRUG ADDON: CPT | Performed by: NURSE PRACTITIONER

## 2023-06-24 PROCEDURE — 96374 THER/PROPH/DIAG INJ IV PUSH: CPT | Mod: 59 | Performed by: NURSE PRACTITIONER

## 2023-06-24 PROCEDURE — 250N000011 HC RX IP 250 OP 636: Performed by: NURSE PRACTITIONER

## 2023-06-24 PROCEDURE — 99284 EMERGENCY DEPT VISIT MOD MDM: CPT | Performed by: NURSE PRACTITIONER

## 2023-06-24 PROCEDURE — 74177 CT ABD & PELVIS W/CONTRAST: CPT

## 2023-06-24 PROCEDURE — 81001 URINALYSIS AUTO W/SCOPE: CPT | Performed by: NURSE PRACTITIONER

## 2023-06-24 PROCEDURE — 99285 EMERGENCY DEPT VISIT HI MDM: CPT | Mod: 25 | Performed by: NURSE PRACTITIONER

## 2023-06-24 PROCEDURE — 82248 BILIRUBIN DIRECT: CPT | Performed by: NURSE PRACTITIONER

## 2023-06-24 PROCEDURE — 96376 TX/PRO/DX INJ SAME DRUG ADON: CPT | Performed by: NURSE PRACTITIONER

## 2023-06-24 RX ORDER — ONDANSETRON 2 MG/ML
4 INJECTION INTRAMUSCULAR; INTRAVENOUS EVERY 30 MIN PRN
Status: DISCONTINUED | OUTPATIENT
Start: 2023-06-24 | End: 2023-06-24 | Stop reason: HOSPADM

## 2023-06-24 RX ORDER — HYDROMORPHONE HYDROCHLORIDE 1 MG/ML
0.5 INJECTION, SOLUTION INTRAMUSCULAR; INTRAVENOUS; SUBCUTANEOUS EVERY 30 MIN PRN
Status: COMPLETED | OUTPATIENT
Start: 2023-06-24 | End: 2023-06-24

## 2023-06-24 RX ORDER — IOPAMIDOL 755 MG/ML
500 INJECTION, SOLUTION INTRAVASCULAR ONCE
Status: COMPLETED | OUTPATIENT
Start: 2023-06-24 | End: 2023-06-24

## 2023-06-24 RX ADMIN — SODIUM CHLORIDE 60 ML: 9 INJECTION, SOLUTION INTRAVENOUS at 15:24

## 2023-06-24 RX ADMIN — HYDROMORPHONE HYDROCHLORIDE 0.5 MG: 1 INJECTION, SOLUTION INTRAMUSCULAR; INTRAVENOUS; SUBCUTANEOUS at 15:16

## 2023-06-24 RX ADMIN — HYDROMORPHONE HYDROCHLORIDE 0.5 MG: 1 INJECTION, SOLUTION INTRAMUSCULAR; INTRAVENOUS; SUBCUTANEOUS at 14:11

## 2023-06-24 RX ADMIN — SODIUM CHLORIDE 1000 ML: 9 INJECTION, SOLUTION INTRAVENOUS at 14:08

## 2023-06-24 RX ADMIN — ONDANSETRON 4 MG: 2 INJECTION INTRAMUSCULAR; INTRAVENOUS at 14:10

## 2023-06-24 RX ADMIN — IOPAMIDOL 82 ML: 755 INJECTION, SOLUTION INTRAVENOUS at 15:25

## 2023-06-24 RX ADMIN — HYDROMORPHONE HYDROCHLORIDE 0.5 MG: 1 INJECTION, SOLUTION INTRAMUSCULAR; INTRAVENOUS; SUBCUTANEOUS at 16:06

## 2023-06-24 ASSESSMENT — ACTIVITIES OF DAILY LIVING (ADL)
ADLS_ACUITY_SCORE: 35
ADLS_ACUITY_SCORE: 35

## 2023-06-24 NOTE — ED TRIAGE NOTES
Pt presents with left lower abdominal pain. Pt is s/p appy approx 1 month ago. Pt states pain for one week. Pt states mucous with blood from rectum. No large BM's for a few days.      Triage Assessment       Row Name 06/24/23 1306       Triage Assessment (Adult)    Airway WDL WDL       Respiratory WDL    Respiratory WDL WDL       Skin Circulation/Temperature WDL    Skin Circulation/Temperature WDL WDL       Cardiac WDL    Cardiac WDL WDL       Peripheral/Neurovascular WDL    Peripheral Neurovascular WDL WDL       Cognitive/Neuro/Behavioral WDL    Cognitive/Neuro/Behavioral WDL WDL

## 2023-06-24 NOTE — ED PROVIDER NOTES
History     Chief Complaint   Patient presents with     Abdominal Pain     HPI  Heidi Su is a 38 year old female with history of T1 diabetes (on insulin pump), s/p abdominal surgery on 5/19 for microperforation of ileum of small bowel who presents with right upper abdominal pain.  She reports the pain has been ongoing since surgery but much worse over the last week.  She noted mucus with blood from the rectum yesterday.  She has been having varying stools, some loose and some formed.  She describes the pain on the right side of her abdomen as a burning and stabbing pain.    Recent surgical history:  Patient was hospitalized here 5/19-5/22/2023 due to severe abdominal and chest pain with concern for emergent surgical abdomen.  She was emergently brought to the operating room due to a perforated bowel.  Surgeon reported  operative findings suspicious for microperforation of ileum of small bowel that was treated surgically.  She had diagnostic laparoscopy, exploratory laparotomy, and appendectomy.  She was admitted to ICU postoperatively and treated with IV Zosyn.  Antibiotics were discontinued at time of discharge per the recommendation of surgery.  After further investigation sepsis was not suspected.  She did not have DKA preoperatively, but postoperatively she had transient mild DKA but she recovered nicely.    She was evaluated here on 6/18 for left sided chest pain. Chest CT PE study/abdomen/pelvis which was negative. No acute abnormality of abdomen/pelvis.    She reports that the chest pain she was having last week has resolved, but the abdominal pain has been worsening.  Nauseated and some vomiting yesterday.  Denies fevers, but reports going from hot and cold.  Denies urinary symptoms.  She reports labile blood sugars, highest at 300s.    Allergies:  Allergies   Allergen Reactions     No Known Allergies        Problem List:    Patient Active Problem List    Diagnosis Date Noted     Hypophosphatemia  05/21/2023     Priority: Medium     Hypokalemia 05/20/2023     Priority: Medium     Anemia, unspecified type 05/20/2023     Priority: Medium     Surgical abdomen 05/19/2023     Priority: Medium     Free intraperitoneal air 05/19/2023     Priority: Medium     Type 1 diabetes mellitus with hyperglycemia (H) 05/19/2023     Priority: Medium     Insulin pump status 05/19/2023     Priority: Medium     ADHD (attention deficit hyperactivity disorder) 05/19/2023     Priority: Medium     Depression with anxiety 05/19/2023     Priority: Medium     SIRS (systemic inflammatory response syndrome) (H) 05/19/2023     Priority: Medium     Diabetic ketoacidosis without coma associated with type 1 diabetes mellitus (H) 05/19/2023     Priority: Medium     Thrombosis of right saphenous vein 05/19/2023     Priority: Medium     CARDIOVASCULAR SCREENING; LDL GOAL LESS THAN 160 04/03/2012     Priority: Medium        Past Medical History:    Past Medical History:   Diagnosis Date     Anxiety      Bronchitis      Depressive disorder      Diabetes (H)      NO ACTIVE PROBLEMS      Ovarian cyst        Past Surgical History:    Past Surgical History:   Procedure Laterality Date     APPENDECTOMY OPEN N/A 5/19/2023    Procedure: Open Appendectomy;  Surgeon: Jesse Blackman MD;  Location: [facility]     CYSTECTOMY OVARIAN BENIGN       DILATION AND CURETTAGE       DILATION AND CURETTAGE  2011     GYN SURGERY       HEMORRHOID SURGERY       LAPAROSCOPY DIAGNOSTIC (GENERAL) N/A 5/19/2023    Procedure: LAPAROSCOPY, DIAGNOSTIC;  Surgeon: Jesse Blackman MD;  Location: [facility]     LAPAROTOMY EXPLORATORY N/A 5/19/2023    Procedure: LAPAROTOMY;  Surgeon: Jesse Blackman MD;  Location: [facility]       Family History:    Family History   Problem Relation Age of Onset     Cerebrovascular Disease Maternal Grandfather      Breast Cancer Maternal Grandfather      Asthma Sister      Diabetes Sister 14       Social History:  Marital Status:  Single [1]  Social History      Tobacco Use     Smoking status: Every Day     Packs/day: 0.25     Types: Cigarettes     Smokeless tobacco: Never   Substance Use Topics     Alcohol use: Yes     Comment: 1-2x/month     Drug use: No        Medications:    acetaminophen (TYLENOL) 500 MG tablet  amphetamine-dextroamphetamine (ADDERALL) 15 MG tablet  blood glucose (CONTOUR NEXT TEST) test strip  Continuous Blood Gluc Sensor (DEXCOM G6 SENSOR) MISC  Continuous Blood Gluc Transmit (DEXCOM G6 TRANSMITTER) MISC  famotidine (PEPCID) 20 MG tablet  insulin aspart (NOVOLOG VIAL) 100 UNITS/ML vial  Insulin Glargine-yfgn 100 UNIT/ML SOPN  KETOSTIX test strip  QUEtiapine (SEROQUEL) 25 MG tablet          Review of Systems  As mentioned above in the history present illness. All other systems were reviewed and are negative.    Physical Exam   BP: (!) 144/103  Pulse: 60  Temp: 97.5  F (36.4  C)  Resp: 16  Weight: 75.3 kg (166 lb)  SpO2: 99 %      Physical Exam  Constitutional:       General: She is not in acute distress.     Appearance: Normal appearance. She is well-developed. She is not ill-appearing.   HENT:      Head: Normocephalic and atraumatic.      Right Ear: External ear normal.      Left Ear: External ear normal.      Nose: Nose normal.      Mouth/Throat:      Mouth: Mucous membranes are moist.   Eyes:      Conjunctiva/sclera: Conjunctivae normal.   Cardiovascular:      Rate and Rhythm: Normal rate and regular rhythm.      Heart sounds: Normal heart sounds. No murmur heard.  Pulmonary:      Effort: Pulmonary effort is normal. No respiratory distress.      Breath sounds: Normal breath sounds.   Abdominal:      General: Bowel sounds are normal. There is no distension.      Palpations: Abdomen is soft.      Tenderness: There is abdominal tenderness in the right upper quadrant.   Musculoskeletal:         General: Normal range of motion.   Skin:     General: Skin is warm and dry.      Findings: No rash.   Neurological:      General: No focal deficit present.       Mental Status: She is alert and oriented to person, place, and time.         ED Course                 Procedures            Results for orders placed or performed during the hospital encounter of 06/24/23 (from the past 24 hour(s))   CBC with platelets differential    Narrative    The following orders were created for panel order CBC with platelets differential.  Procedure                               Abnormality         Status                     ---------                               -----------         ------                     CBC with platelets and d...[[id number]]  Abnormal            Final result                 Please view results for these tests on the individual orders.   Hepatic function panel   Result Value Ref Range    Protein Total 6.6 6.4 - 8.3 g/dL    Albumin 3.8 3.5 - 5.2 g/dL    Bilirubin Total 0.2 <=1.2 mg/dL    Alkaline Phosphatase 88 35 - 104 U/L    AST 14 0 - 45 U/L    ALT 15 0 - 50 U/L    Bilirubin Direct <0.20 0.00 - 0.30 mg/dL   Lipase   Result Value Ref Range    Lipase 43 13 - 60 U/L   CBC with platelets and differential   Result Value Ref Range    WBC Count 3.6 (L) 4.0 - 11.0 10e3/uL    RBC Count 3.85 3.80 - 5.20 10e6/uL    Hemoglobin 11.6 (L) 11.7 - 15.7 g/dL    Hematocrit 34.8 (L) 35.0 - 47.0 %    MCV 90 78 - 100 fL    MCH 30.1 26.5 - 33.0 pg    MCHC 33.3 31.5 - 36.5 g/dL    RDW 14.6 10.0 - 15.0 %    Platelet Count 283 150 - 450 10e3/uL    % Neutrophils 41 %    % Lymphocytes 45 %    % Monocytes 8 %    % Eosinophils 5 %    % Basophils 1 %    % Immature Granulocytes 0 %    NRBCs per 100 WBC 0 <1 /100    Absolute Neutrophils 1.5 (L) 1.6 - 8.3 10e3/uL    Absolute Lymphocytes 1.6 0.8 - 5.3 10e3/uL    Absolute Monocytes 0.3 0.0 - 1.3 10e3/uL    Absolute Eosinophils 0.2 0.0 - 0.7 10e3/uL    Absolute Basophils 0.0 0.0 - 0.2 10e3/uL    Absolute Immature Granulocytes 0.0 <=0.4 10e3/uL    Absolute NRBCs 0.0 10e3/uL   Basic metabolic panel   Result Value Ref Range    Sodium 136 136 - 145  mmol/L    Potassium 4.1 3.4 - 5.3 mmol/L    Chloride 103 98 - 107 mmol/L    Carbon Dioxide (CO2) 21 (L) 22 - 29 mmol/L    Anion Gap 12 7 - 15 mmol/L    Urea Nitrogen 14.4 6.0 - 20.0 mg/dL    Creatinine 0.59 0.51 - 0.95 mg/dL    Calcium 8.6 8.6 - 10.0 mg/dL    Glucose 167 (H) 70 - 99 mg/dL    GFR Estimate >90 >60 mL/min/1.73m2   UA with Microscopic reflex to Culture    Specimen: Urine, Midstream   Result Value Ref Range    Color Urine Straw Colorless, Straw, Light Yellow, Yellow    Appearance Urine Clear Clear    Glucose Urine 50 (A) Negative mg/dL    Bilirubin Urine Negative Negative    Ketones Urine Negative Negative mg/dL    Specific Gravity Urine 1.010 1.003 - 1.035    Blood Urine Negative Negative    pH Urine 5.0 5.0 - 7.0    Protein Albumin Urine Negative Negative mg/dL    Urobilinogen Urine Normal Normal, 2.0 mg/dL    Nitrite Urine Negative Negative    Leukocyte Esterase Urine Negative Negative    Mucus Urine Present (A) None Seen /LPF    RBC Urine <1 <=2 /HPF    WBC Urine <1 <=5 /HPF    Squamous Epithelials Urine 4 (H) <=1 /HPF    Narrative    Urine Culture not indicated   CT Abdomen Pelvis w Contrast    Narrative    EXAM: CT ABDOMEN PELVIS W CONTRAST  LOCATION: [facility]  DATE: 6/24/2023    INDICATION: right sided abdominal pain.  COMPARISON: 06/18/2023  TECHNIQUE: CT scan of the abdomen and pelvis was performed following injection of IV contrast. Multiplanar reformats were obtained. Dose reduction techniques were used.  CONTRAST: 82 ml Isovue  370    FINDINGS:   LOWER CHEST: Dependent atelectasis.    HEPATOBILIARY: Normal.    PANCREAS: Normal.    SPLEEN: Normal.    ADRENAL GLANDS: Normal.    KIDNEYS/BLADDER: Normal.    BOWEL: No obstruction or inflammation. Appendix is absent. Scattered noninflamed colonic diverticuli.    LYMPH NODES: No enlarged lymph nodes.    VASCULATURE: Patent portal, splenic, and superior mesenteric veins. Mild noncalcified aortic atherosclerotic  plaque. No abdominal aortic aneurysm.    PELVIC ORGANS: Absent uterus.    MUSCULOSKELETAL: Mild multilevel degenerative changes of the spine. No aggressive or destructive lesion. Postsurgical changes within the midline ventral supraumbilical fat. No fluid collections.      Impression    IMPRESSION:    No acute findings or other explanation for abdominal pain.         Medications   0.9% sodium chloride BOLUS (0 mLs Intravenous Stopped 6/24/23 1651)   HYDROmorphone (PF) (DILAUDID) injection 0.5 mg (0.5 mg Intravenous $Given 6/24/23 1606)   100 mL saline bag CT (60 mLs Intravenous $Given 6/24/23 1524)   iopamidol (ISOVUE-370) solution 500 mL (82 mLs Intravenous $Given 6/24/23 1525)       Assessments & Plan (with Medical Decision Making)   No worrisome lab or any imaging findings to explain her abdominal pain.  No evidence of bowel obstruction.  No evidence of gallbladder disease.  No evidence of abscess.  Patient was given IV normal saline 1 L bolus, and IV Dilaudid which helped her pain.  Patient instructed to recheck with her clinic provider this week.      Discharge Medication List as of 6/24/2023  4:51 PM          Final diagnoses:   Abdominal pain, right upper quadrant       6/24/2023   [facility] EMERGENCY DEPT     Yanick, EDGARDO [place] CNP  06/24/23 [id number]

## 2023-06-24 NOTE — DISCHARGE INSTRUCTIONS
Unclear cause for your abdominal pain.    No concerning findings on your abdominal/pelvis CT.  Labs are reassuring.    Recheck with your clinic provider this week.

## 2023-06-26 ENCOUNTER — OFFICE VISIT (OUTPATIENT)
Dept: SURGERY | Facility: CLINIC | Age: 38
End: 2023-06-26
Payer: COMMERCIAL

## 2023-06-26 VITALS
DIASTOLIC BLOOD PRESSURE: 92 MMHG | SYSTOLIC BLOOD PRESSURE: 138 MMHG | BODY MASS INDEX: 26.79 KG/M2 | TEMPERATURE: 96.4 F | WEIGHT: 166 LBS

## 2023-06-26 DIAGNOSIS — R10.11 RUQ PAIN: Primary | ICD-10-CM

## 2023-06-26 DIAGNOSIS — R11.0 NAUSEA: ICD-10-CM

## 2023-06-26 DIAGNOSIS — R19.7 DIARRHEA, UNSPECIFIED TYPE: ICD-10-CM

## 2023-06-26 PROCEDURE — 99214 OFFICE O/P EST MOD 30 MIN: CPT | Mod: 24 | Performed by: SPECIALIST

## 2023-06-26 ASSESSMENT — PAIN SCALES - GENERAL: PAINLEVEL: SEVERE PAIN (7)

## 2023-06-26 NOTE — PROGRESS NOTES
Follow-up for right upper quadrant pain    Subjective:  Patient is well-known to me for an exploratory laparotomy and appendectomy.  She was steadily improving up until about a week ago when she began developing right upper quadrant pain.  She describes the pain as sharp and burning with associated nausea.  She also reports an occasional bloody mucousy diarrhea.  Denies any family history of ulcerative colitis or Crohn's disease.  Denies any fatty food intolerance or prior episodes.  Was quite severe she went to the ER over the weekend and her labs and the CT done in the ER at that time were normal.  She now presents for evaluation of her right upper quadrant pain.    Objective:  B/P: 138/92, T: 96.4, P: Data Unavailable, R: Data Unavailable  Abdomen: Soft, nondistended, minimal right upper quadrant tenderness to deep palpation, no hernias.    Lab Results   Component Value Date    WBC 3.6 06/24/2023    WBC 8.5 10/12/2020     Lab Results   Component Value Date    RBC 3.85 06/24/2023    RBC 4.44 10/12/2020     Lab Results   Component Value Date    HGB 11.6 06/24/2023    HGB 14.4 10/12/2020     Lab Results   Component Value Date    HCT 34.8 06/24/2023    HCT 42.3 10/12/2020     No components found for: MCT  Lab Results   Component Value Date    MCV 90 06/24/2023    MCV 95 10/12/2020     Lab Results   Component Value Date    MCH 30.1 06/24/2023    MCH 32.4 10/12/2020     Lab Results   Component Value Date    MCHC 33.3 06/24/2023    MCHC 34.0 10/12/2020     Lab Results   Component Value Date    RDW 14.6 06/24/2023    RDW 12.1 10/12/2020     Lab Results   Component Value Date     06/24/2023     10/12/2020     Liver Function Studies - Recent Labs   Lab Test 06/24/23  1407   PROTTOTAL 6.6   ALBUMIN 3.8   BILITOTAL 0.2   ALKPHOS 88   AST 14   ALT 15     CT -   EXAM: CT ABDOMEN PELVIS W CONTRAST  LOCATION: [facility]  DATE: 6/24/2023     INDICATION: right sided abdominal  pain.  COMPARISON: 06/18/2023  TECHNIQUE: CT scan of the abdomen and pelvis was performed following injection of IV contrast. Multiplanar reformats were obtained. Dose reduction techniques were used.  CONTRAST: 82 ml Isovue  370     FINDINGS:   LOWER CHEST: Dependent atelectasis.     HEPATOBILIARY: Normal.     PANCREAS: Normal.     SPLEEN: Normal.     ADRENAL GLANDS: Normal.     KIDNEYS/BLADDER: Normal.     BOWEL: No obstruction or inflammation. Appendix is absent. Scattered noninflamed colonic diverticuli.     LYMPH NODES: No enlarged lymph nodes.     VASCULATURE: Patent portal, splenic, and superior mesenteric veins. Mild noncalcified aortic atherosclerotic plaque. No abdominal aortic aneurysm.     PELVIC ORGANS: Absent uterus.     MUSCULOSKELETAL: Mild multilevel degenerative changes of the spine. No aggressive or destructive lesion. Postsurgical changes within the midline ventral supraumbilical fat. No fluid collections.                                                                      IMPRESSION:     No acute findings or other explanation for abdominal pain.       Assessment/plan:  Patient is status post oratory laparotomy for free air about 2 months ago.  She now returns with right upper quadrant pain nausea and vomiting, occasional diarrhea of unclear etiology..  Her CT has been negative.  After discussion with the patient the plan at this time obtain a right upper quadrant ultrasound-though no stones were palpated during the exploratory laparotomy, HIDA scan and if those are negative proceed to EGD and colonoscopy or colonoscopy at the minimum.  The patient is in agreement with that plan.  She will call me once the ultrasound is been completed.    Jesse Blackman MD, FACS

## 2023-06-26 NOTE — LETTER
6/26/2023         RE: Heidi Su  [address]  [place] [address]        Dear Colleague,    Thank you for referring your patient, Heidi Su, to the [facility]. Please see a copy of my visit note below.    Follow-up for right upper quadrant pain    Subjective:  Patient is well-known to me for an exploratory laparotomy and appendectomy.  She was steadily improving up until about a week ago when she began developing right upper quadrant pain.  She describes the pain as sharp and burning with associated nausea.  She also reports an occasional bloody mucousy diarrhea.  Denies any family history of ulcerative colitis or Crohn's disease.  Denies any fatty food intolerance or prior episodes.  Was quite severe she went to the ER over the weekend and her labs and the CT done in the ER at that time were normal.  She now presents for evaluation of her right upper quadrant pain.    Objective:  B/P: 138/92, T: 96.4, P: Data Unavailable, R: Data Unavailable  Abdomen: Soft, nondistended, minimal right upper quadrant tenderness to deep palpation, no hernias.    Lab Results   Component Value Date    WBC 3.6 06/24/2023    WBC 8.5 10/12/2020     Lab Results   Component Value Date    RBC 3.85 06/24/2023    RBC 4.44 10/12/2020     Lab Results   Component Value Date    HGB 11.6 06/24/2023    HGB 14.4 10/12/2020     Lab Results   Component Value Date    HCT 34.8 06/24/2023    HCT 42.3 10/12/2020     No components found for: MCT  Lab Results   Component Value Date    MCV 90 06/24/2023    MCV 95 10/12/2020     Lab Results   Component Value Date    MCH 30.1 06/24/2023    MCH 32.4 10/12/2020     Lab Results   Component Value Date    MCHC 33.3 06/24/2023    MCHC 34.0 10/12/2020     Lab Results   Component Value Date    RDW 14.6 06/24/2023    RDW 12.1 10/12/2020     Lab Results   Component Value Date     06/24/2023     10/12/2020     Liver Function Studies - Recent Labs   Lab  Test 06/24/23  1407   PROTTOTAL 6.6   ALBUMIN 3.8   BILITOTAL 0.2   ALKPHOS 88   AST 14   ALT 15     CT -   EXAM: CT ABDOMEN PELVIS W CONTRAST  LOCATION: [facility]  DATE: 6/24/2023     INDICATION: right sided abdominal pain.  COMPARISON: 06/18/2023  TECHNIQUE: CT scan of the abdomen and pelvis was performed following injection of IV contrast. Multiplanar reformats were obtained. Dose reduction techniques were used.  CONTRAST: 82 ml Isovue  370     FINDINGS:   LOWER CHEST: Dependent atelectasis.     HEPATOBILIARY: Normal.     PANCREAS: Normal.     SPLEEN: Normal.     ADRENAL GLANDS: Normal.     KIDNEYS/BLADDER: Normal.     BOWEL: No obstruction or inflammation. Appendix is absent. Scattered noninflamed colonic diverticuli.     LYMPH NODES: No enlarged lymph nodes.     VASCULATURE: Patent portal, splenic, and superior mesenteric veins. Mild noncalcified aortic atherosclerotic plaque. No abdominal aortic aneurysm.     PELVIC ORGANS: Absent uterus.     MUSCULOSKELETAL: Mild multilevel degenerative changes of the spine. No aggressive or destructive lesion. Postsurgical changes within the midline ventral supraumbilical fat. No fluid collections.                                                                      IMPRESSION:     No acute findings or other explanation for abdominal pain.       Assessment/plan:  Patient is status post oratory laparotomy for free air about 2 months ago.  She now returns with right upper quadrant pain nausea and vomiting, occasional diarrhea of unclear etiology..  Her CT has been negative.  After discussion with the patient the plan at this time obtain a right upper quadrant ultrasound-though no stones were palpated during the exploratory laparotomy, HIDA scan and if those are negative proceed to EGD and colonoscopy or colonoscopy at the minimum.  The patient is in agreement with that plan.  She will call me once the ultrasound is been completed.    Jesse  MD Yehuda, FACS      Again, thank you for allowing me to participate in the care of your patient.        Sincerely,        Jesse Blackman MD

## 2023-06-27 ENCOUNTER — HOSPITAL ENCOUNTER (OUTPATIENT)
Dept: ULTRASOUND IMAGING | Facility: CLINIC | Age: 38
Discharge: HOME | End: 2023-06-27
Attending: SPECIALIST | Admitting: SPECIALIST
Payer: COMMERCIAL

## 2023-06-27 DIAGNOSIS — R19.7 DIARRHEA, UNSPECIFIED TYPE: ICD-10-CM

## 2023-06-27 DIAGNOSIS — R11.0 NAUSEA: ICD-10-CM

## 2023-06-27 DIAGNOSIS — R10.11 RUQ PAIN: ICD-10-CM

## 2023-06-27 PROCEDURE — 76705 ECHO EXAM OF ABDOMEN: CPT

## 2023-08-07 ENCOUNTER — HOSPITAL ENCOUNTER (EMERGENCY)
Facility: CLINIC | Age: 38
Discharge: LEFT BEFORE DISCHARGE | End: 2023-08-07
Admitting: EMERGENCY MEDICINE
Payer: COMMERCIAL

## 2023-08-07 VITALS
BODY MASS INDEX: 24.86 KG/M2 | RESPIRATION RATE: 20 BRPM | SYSTOLIC BLOOD PRESSURE: 149 MMHG | DIASTOLIC BLOOD PRESSURE: 97 MMHG | WEIGHT: 154 LBS | TEMPERATURE: 97.7 F | HEART RATE: 100 BPM | OXYGEN SATURATION: 100 %

## 2023-08-07 PROCEDURE — 99281 EMR DPT VST MAYX REQ PHY/QHP: CPT

## 2023-09-24 ENCOUNTER — HEALTH MAINTENANCE LETTER (OUTPATIENT)
Age: 38
End: 2023-09-24

## 2023-10-10 ENCOUNTER — APPOINTMENT (OUTPATIENT)
Dept: CT IMAGING | Facility: CLINIC | Age: 38
End: 2023-10-10
Attending: NURSE PRACTITIONER
Payer: COMMERCIAL

## 2023-10-10 ENCOUNTER — HOSPITAL ENCOUNTER (EMERGENCY)
Facility: CLINIC | Age: 38
Discharge: HOME | End: 2023-10-10
Attending: NURSE PRACTITIONER | Admitting: NURSE PRACTITIONER
Payer: COMMERCIAL

## 2023-10-10 VITALS
OXYGEN SATURATION: 100 % | WEIGHT: 150.1 LBS | HEART RATE: 79 BPM | TEMPERATURE: 97.8 F | DIASTOLIC BLOOD PRESSURE: 87 MMHG | BODY MASS INDEX: 24.23 KG/M2 | SYSTOLIC BLOOD PRESSURE: 120 MMHG | RESPIRATION RATE: 18 BRPM

## 2023-10-10 DIAGNOSIS — R20.0 RIGHT SIDED NUMBNESS: ICD-10-CM

## 2023-10-10 DIAGNOSIS — R52 PAINFUL PARESTHESIA: ICD-10-CM

## 2023-10-10 DIAGNOSIS — R20.2 PAINFUL PARESTHESIA: ICD-10-CM

## 2023-10-10 LAB
ALBUMIN SERPL BCG-MCNC: 4 G/DL (ref 3.5–5.2)
ALP SERPL-CCNC: 91 U/L (ref 35–104)
ALT SERPL W P-5'-P-CCNC: 12 U/L (ref 0–50)
ANION GAP SERPL CALCULATED.3IONS-SCNC: 14 MMOL/L (ref 7–15)
AST SERPL W P-5'-P-CCNC: 12 U/L (ref 0–45)
B-OH-BUTYR SERPL-SCNC: <0.18 MMOL/L
BASO+EOS+MONOS # BLD AUTO: NORMAL 10*3/UL
BASO+EOS+MONOS NFR BLD AUTO: NORMAL %
BASOPHILS # BLD AUTO: 0 10E3/UL (ref 0–0.2)
BASOPHILS NFR BLD AUTO: 0 %
BILIRUB SERPL-MCNC: 0.2 MG/DL
BUN SERPL-MCNC: 15.7 MG/DL (ref 6–20)
CALCIUM SERPL-MCNC: 8.9 MG/DL (ref 8.6–10)
CHLORIDE SERPL-SCNC: 100 MMOL/L (ref 98–107)
CREAT SERPL-MCNC: 0.63 MG/DL (ref 0.51–0.95)
DEPRECATED HCO3 PLAS-SCNC: 21 MMOL/L (ref 22–29)
EGFRCR SERPLBLD CKD-EPI 2021: >90 ML/MIN/1.73M2
EOSINOPHIL # BLD AUTO: 0.1 10E3/UL (ref 0–0.7)
EOSINOPHIL NFR BLD AUTO: 1 %
ERYTHROCYTE [DISTWIDTH] IN BLOOD BY AUTOMATED COUNT: 12.6 % (ref 10–15)
GLUCOSE SERPL-MCNC: 418 MG/DL (ref 70–99)
HCT VFR BLD AUTO: 37.7 % (ref 35–47)
HGB BLD-MCNC: 12.9 G/DL (ref 11.7–15.7)
IMM GRANULOCYTES # BLD: 0 10E3/UL
IMM GRANULOCYTES NFR BLD: 0 %
LYMPHOCYTES # BLD AUTO: 2.4 10E3/UL (ref 0.8–5.3)
LYMPHOCYTES NFR BLD AUTO: 33 %
MCH RBC QN AUTO: 31.2 PG (ref 26.5–33)
MCHC RBC AUTO-ENTMCNC: 34.2 G/DL (ref 31.5–36.5)
MCV RBC AUTO: 91 FL (ref 78–100)
MONOCYTES # BLD AUTO: 0.5 10E3/UL (ref 0–1.3)
MONOCYTES NFR BLD AUTO: 6 %
NEUTROPHILS # BLD AUTO: 4.4 10E3/UL (ref 1.6–8.3)
NEUTROPHILS NFR BLD AUTO: 60 %
NRBC # BLD AUTO: 0 10E3/UL
NRBC BLD AUTO-RTO: 0 /100
PLATELET # BLD AUTO: 329 10E3/UL (ref 150–450)
POTASSIUM SERPL-SCNC: 4 MMOL/L (ref 3.4–5.3)
PROT SERPL-MCNC: 6.8 G/DL (ref 6.4–8.3)
RBC # BLD AUTO: 4.14 10E6/UL (ref 3.8–5.2)
SODIUM SERPL-SCNC: 135 MMOL/L (ref 135–145)
WBC # BLD AUTO: 7.4 10E3/UL (ref 4–11)

## 2023-10-10 PROCEDURE — 70450 CT HEAD/BRAIN W/O DYE: CPT

## 2023-10-10 PROCEDURE — 99285 EMERGENCY DEPT VISIT HI MDM: CPT | Mod: 25 | Performed by: NURSE PRACTITIONER

## 2023-10-10 PROCEDURE — 99284 EMERGENCY DEPT VISIT MOD MDM: CPT | Performed by: NURSE PRACTITIONER

## 2023-10-10 PROCEDURE — 96374 THER/PROPH/DIAG INJ IV PUSH: CPT | Mod: 59 | Performed by: NURSE PRACTITIONER

## 2023-10-10 PROCEDURE — 82010 KETONE BODYS QUAN: CPT | Performed by: NURSE PRACTITIONER

## 2023-10-10 PROCEDURE — 250N000011 HC RX IP 250 OP 636: Performed by: NURSE PRACTITIONER

## 2023-10-10 PROCEDURE — 85025 COMPLETE CBC W/AUTO DIFF WBC: CPT | Performed by: NURSE PRACTITIONER

## 2023-10-10 PROCEDURE — 36415 COLL VENOUS BLD VENIPUNCTURE: CPT | Performed by: NURSE PRACTITIONER

## 2023-10-10 PROCEDURE — 70496 CT ANGIOGRAPHY HEAD: CPT

## 2023-10-10 PROCEDURE — 80053 COMPREHEN METABOLIC PANEL: CPT | Performed by: NURSE PRACTITIONER

## 2023-10-10 PROCEDURE — 70498 CT ANGIOGRAPHY NECK: CPT

## 2023-10-10 PROCEDURE — 250N000009 HC RX 250: Performed by: NURSE PRACTITIONER

## 2023-10-10 RX ORDER — IOPAMIDOL 755 MG/ML
100 INJECTION, SOLUTION INTRAVASCULAR ONCE
Status: COMPLETED | OUTPATIENT
Start: 2023-10-10 | End: 2023-10-10

## 2023-10-10 RX ORDER — HYDROMORPHONE HYDROCHLORIDE 1 MG/ML
0.5 INJECTION, SOLUTION INTRAMUSCULAR; INTRAVENOUS; SUBCUTANEOUS ONCE
Status: COMPLETED | OUTPATIENT
Start: 2023-10-10 | End: 2023-10-10

## 2023-10-10 RX ADMIN — SODIUM CHLORIDE 100 ML: 9 INJECTION, SOLUTION INTRAVENOUS at 18:03

## 2023-10-10 RX ADMIN — HYDROMORPHONE HYDROCHLORIDE 0.5 MG: 1 INJECTION, SOLUTION INTRAMUSCULAR; INTRAVENOUS; SUBCUTANEOUS at 18:18

## 2023-10-10 RX ADMIN — IOPAMIDOL 80 ML: 755 INJECTION, SOLUTION INTRAVENOUS at 18:03

## 2023-10-10 ASSESSMENT — ACTIVITIES OF DAILY LIVING (ADL)
ADLS_ACUITY_SCORE: 35
ADLS_ACUITY_SCORE: 33

## 2023-10-10 NOTE — MEDICATION SCRIBE - ADMISSION MEDICATION HISTORY
Medication Scribe Admission Medication History    Admission medication history is complete. The information provided in this note is only as accurate as the sources available at the time of the update.    Information Source(s): Patient and CareEverywhere/SureScripts via in-person    Pertinent Information: n/a    Changes made to PTA medication list:  Added: None  Deleted: None  Changed: adderall to 15mg BID, famotidine once daily    Medication Affordability:  Not including over the counter (OTC) medications, was there a time in the past 3 months when you did not take your medications as prescribed because of cost?: No    Allergies reviewed with patient and updates made in EHR: yes    Medication History Completed By: MERVIN JIMENEZ 10/10/2023 6:02 PM    PTA Med List   Medication Sig Last Dose    acetaminophen (TYLENOL) 500 MG tablet Take 500 mg by mouth every 4 hours as needed for mild pain 10/10/2023 at 1530    amphetamine-dextroamphetamine (ADDERALL) 15 MG tablet Take 15 mg by mouth 2 times daily Am and six hours later 10/10/2023 at am    blood glucose (CONTOUR NEXT TEST) test strip TEST 5- 6 TIMES/DAY, uncontrolled diabetes  at backup    Continuous Blood Gluc Sensor (DEXCOM G6 SENSOR) MISC USE AS DIRECTED TO CHECK BLOOD SUGARS. CHANGE EVERY 10 DAYS  at current    Continuous Blood Gluc Transmit (DEXCOM G6 TRANSMITTER) MISC   at belly    famotidine (PEPCID) 20 MG tablet Take 20 mg by mouth daily 10/10/2023 at am    insulin aspart (NOVOLOG VIAL) 100 UNITS/ML vial Inject Subcutaneous daily Maximum 60 units daily in pump  at current    Insulin Glargine-yfgn 100 UNIT/ML SOPN inject 24 units subq every 24 hours in case of pump failure More than a month at Sept 2022    KETOSTIX test strip FOR PERSONAL USE IF DKA IS SUSPECTED. Unknown at unkn    QUEtiapine (SEROQUEL) 25 MG tablet Take 25 mg by mouth At Bedtime 10/9/2023 at hs
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
## 2023-10-11 ENCOUNTER — PRE VISIT (OUTPATIENT)
Dept: NEUROLOGY | Facility: CLINIC | Age: 38
End: 2023-10-11

## 2023-10-11 ENCOUNTER — OFFICE VISIT (OUTPATIENT)
Dept: NEUROLOGY | Facility: CLINIC | Age: 38
End: 2023-10-11
Attending: NURSE PRACTITIONER
Payer: COMMERCIAL

## 2023-10-11 VITALS — SYSTOLIC BLOOD PRESSURE: 129 MMHG | DIASTOLIC BLOOD PRESSURE: 94 MMHG | OXYGEN SATURATION: 100 % | HEART RATE: 108 BPM

## 2023-10-11 DIAGNOSIS — R52 PAINFUL PARESTHESIA: ICD-10-CM

## 2023-10-11 DIAGNOSIS — R20.2 PAINFUL PARESTHESIA: ICD-10-CM

## 2023-10-11 PROCEDURE — 99205 OFFICE O/P NEW HI 60 MIN: CPT | Performed by: PSYCHIATRY & NEUROLOGY

## 2023-10-11 ASSESSMENT — ENCOUNTER SYMPTOMS
VOMITING: 0
HEADACHES: 1
SHORTNESS OF BREATH: 0
CONSTIPATION: 0
FEVER: 0
NUMBNESS: 1
APPETITE CHANGE: 0
MUSCULOSKELETAL NEGATIVE: 1
ABDOMINAL PAIN: 0
COUGH: 0
NAUSEA: 0
DIARRHEA: 0
FATIGUE: 0

## 2023-10-11 ASSESSMENT — PAIN SCALES - GENERAL: PAINLEVEL: SEVERE PAIN (7)

## 2023-10-11 NOTE — PATIENT INSTRUCTIONS
I think you need to present to an emergency department here and obtain an MRI brain and cervical spine to rule out major neurological issues like MS or NMO.  They may require you to be admitted or have further testing like CSF analysis.

## 2023-10-11 NOTE — NURSING NOTE
Chief Complaint   Patient presents with    New Patient       Vitals were taken and medications were reconciled.      Iron Medina, Technician  3:52 PM  October 11, 2023

## 2023-10-11 NOTE — DISCHARGE INSTRUCTIONS
No worrisome findings on your Head CT and CT angiogram of the head and neck vessles.  You have opted to defer MRI imaging;  I have placed referral for a visit with Neurology.  Make appointment to see your clinic provider for recheck in the next week.

## 2023-10-11 NOTE — PROGRESS NOTES
[facility] Neurology Consultation    Heidi Su MRN# [id number]   Age: 38 year old YOB: 1985     Requesting physician: Debra Campbell     Reason for Consultation: paresthesias      History of Presenting Symptoms:   Heidi Su is a 38 year old female who presents today for evaluation of paresthesias.  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      The patient was seen 10/10/2023 in [facility] ED for whole body pain and limited sensation on her right side (face to toes).  Neurological exam was normal, and she had subjective weakness with her gait.  Symptoms were reported for a duration of 3 days. An MRI was to be done, but the patient left before imaging could be done.    The patient then presented to [facility] reporting a similar story of 3-4 days of numbness on her left, followed by the development of right sided numbness with full body pain.  No weakness was noted on exam, normal neurological exam was otherwise noted.  CT head and CTA head and neck were normal.  MRI was recommended, but the patient refused removal of Dexcom glucose meter on her arm so the study was not done.    A visit with her PCP 10/4/2023 was revealing for ongoing low back pain worsened after a slip on steps in 9/2023 with 8-9/10 at all times or when sitting for too long.    The patient indicates her left arm/hand was clumsy upon waking on Friday.  Then on Saturday, she developed right leg numbness and sensory loss.  Then she developed right arm and hand numbness and sensory loss (whole arm).  On Tuesday, she awoke with a headache and had right arm and facial droop.      There are no swallowing  issues, but she has some feeling like her speech is slurred or it takes longer for her to produce a word.  She has periods of confusion, and felt like she was forgetting what she was doing when driving (almost got lost driving to her parents).  There is no LOC.  There is no ongoing fever.  There is right eye vision difference (says her right eye is blurred and it is hard to keep it open).      Social History:   Every day smoker (0.75 ppd).  No alcohol abuse history. Reports no drug use. Has well water. No exposure to inhalants that are atypical. No family history of multiple sclerosis but mother has fibromyalgia.      Medications:     Current Outpatient Medications   Medication    acetaminophen (TYLENOL) 500 MG tablet    amphetamine-dextroamphetamine (ADDERALL) 15 MG tablet    famotidine (PEPCID) 20 MG tablet    insulin aspart (NOVOLOG VIAL) 100 UNITS/ML vial    Insulin Glargine-yfgn 100 UNIT/ML SOPN    KETOSTIX test strip    QUEtiapine (SEROQUEL) 25 MG tablet      Physical Exam:   Vitals: BP (!) 129/94 (BP Location: Right arm, Patient Position: Right side, Cuff Size: Adult Regular)   Pulse 108   LMP 01/29/2017   SpO2 100%    General: Seated comfortably in no acute distress.  HEENT: No paracervical muscle tenderness or tightness.  Optic discs sharp and vasculature normal on funduscopic exam.  No pain with eye movements or direct light.   Skin: No rashes but some skin mottling and reddened/greyish blanchable regions of her lower extremities b/l as well as distal hands/fingers.  Neurologic:     Mental Status: Fully alert, attentive and oriented.  Accurate historian today. Speech clear and fluent, no paraphasic errors. Follows multiple step commands easily.     Cranial Nerves: Visual fields intact in all quadrants. PERRL. EOMI with normal smooth pursuit (no specific findings of KUN type movements).  Facial sensation intact/symmetric (no loss with pinprick or light touch differentiation). Facial movements symmetric,  but at rest there may be mild nasolabial fold diminishment on the right. Hearing not formally tested but intact to conversation. Palate elevation symmetric, uvula midline. No dysarthria with phoneme (pa, ta, ca) as well as rapid alternation of phonemes. Shoulder shrug strong bilaterally. Tongue protrusion midline.     Motor: No tremors or other abnormal movements observed at rest, but with action of holding eyes closed there is notable muscle twitching and variable movements of the eyelids and other muscles of the body.  Muscle tone normal throughout (no spasticity noted today). No pronator drift. Right hand rapid hand movements are slowed with open-hand to closed hand. Fine motor movements of the right hand is also slow and incoordinated. Strength 5/5 throughout upper and lower extremities (large degree of give-way weakness noted on right side in almost all motor groups but the patient can give full resistance to movements if encouraged).      Deep Tendon Reflexes: 2+/symmetric throughout upper and lower extremities (no loss of reflexes, no hyperreflexia noted). No clonus. Toes downgoing bilaterally.     Sensory: vibration is intact fully in all extremities. Proprioception is normal in great toes b/l.  Pinprick to light touch differentiation is normal in upper extremities, but in the right lower extremity there is a patchy distribution of lack of pinprick reports in the anterior proximal foot without toe or lateral/medial foot involvement. Negative Romberg.      Coordination: Finger-nose-finger and heel-shin intact without dysmetria on the left, but the right side does have larger ataxic movements during full motion as well as upon reaching target. Rapid alternating movements slightly slower on the right compared to the left.     Gait: Stands quickly from seated position without use of hands. Normal stance. When attempting to walk, the right thigh and quadriceps remain stiff and she circumduction to ambulate, with  notable limp. Limp not necessarily noted with tandem walking, which is done with difficult and extra steps required on either leg (no listing or fall).          Data: Pertinent prior to visit   Imaging:  CT head and CTA head and neck were without abnormal findings upon review today         Assessment and Plan:   Assessment:  Acute onset sensory, motor deficits w/incoordination on the right as well as right visual field blurring    The patient's exam today is somewhat inconsistent, as there is little sign of true motor weakness or true sensory loss beyond some patchy sensation loss of her distal foot.  I wonder if her reports of weakness have more to do with incoordination than anything else, as she is walking with circumduction and seems to not trust the movements of her right leg and arm.  While this could be psychogenic in nature, I am not certain major neurological conditions have been ruled out as of yet.  Given the acuity of the onset, I do not feel that delaying this investigation is warranted and asked the patient to present to the ED for consideration of MRI brain and cervical spine to look for signs of demyelinating disease, as well as possible alternative etiologies like paraneoplastic disorders, autoimmune receptor encephalopathies, polypharmacy with quetiapine, or varied vasculitic diseases.  One issue of note, I am not certain about why she has such a blanchable exam on her skin testing, and wonder if this could relate to some of her symptoms if no neurological process is noted with MRI brain or cervical spine.     Plan:  - MRI brain and cervical spine w/wout contrast to be done in expedited fashion at [facility] ED    Follow up in Neurology clinic in 3-6 months pending results of imaging/ED evaluation, or should new concerns arise.    CHIARA Santiago D.O.   of Neurology      Total time today (85 min) in this patient encounter was spent on pre-charting, counseling and/or coordination of  care.  The patient is in agreement with this plan and has no further questions.

## 2023-10-11 NOTE — LETTER
10/11/2023       RE: Heidi Su  [address]  [place] [address]     Dear Colleague,    Thank you for referring your patient, Heidi Su, to the [facility] NEUROLOGY CL[facility]IC [place] at [facility]. Please see a copy of my visit note below.    [facility] Neurology Consultation    Heidi Su MRN# [id number]   Age: 38 year old YOB: 1985     Requesting physician: Debra Campbell     Reason for Consultation: paresthesias      History of Presenting Symptoms:   Heidi Su is a 38 year old female who presents today for evaluation of paresthesias.  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      The patient was seen 10/10/2023 in [facility] ED for whole body pain and limited sensation on her right side (face to toes).  Neurological exam was normal, and she had subjective weakness with her gait.  Symptoms were reported for a duration of 3 days. An MRI was to be done, but the patient left before imaging could be done.    The patient then presented to [facility] reporting a similar story of 3-4 days of numbness on her left, followed by the development of right sided numbness with full body pain.  No weakness was noted on exam, normal neurological exam was otherwise noted.  CT head and CTA head and neck were normal.  MRI was recommended, but the patient refused removal of Dexcom glucose meter on her arm so the study was not done.    A visit with her PCP 10/4/2023 was revealing for ongoing low back pain worsened after a slip on steps in 9/2023 with 8-9/10 at all times or when sitting for too long.    The  patient indicates her left arm/hand was clumsy upon waking on Friday.  Then on Saturday, she developed right leg numbness and sensory loss.  Then she developed right arm and hand numbness and sensory loss (whole arm).  On Tuesday, she awoke with a headache and had right arm and facial droop.      There are no swallowing issues, but she has some feeling like her speech is slurred or it takes longer for her to produce a word.  She has periods of confusion, and felt like she was forgetting what she was doing when driving (almost got lost driving to her parents).  There is no LOC.  There is no ongoing fever.  There is right eye vision difference (says her right eye is blurred and it is hard to keep it open).      Social History:   Every day smoker (0.75 ppd).  No alcohol abuse history. Reports no drug use. Has well water. No exposure to inhalants that are atypical. No family history of multiple sclerosis but mother has fibromyalgia.      Medications:     Current Outpatient Medications   Medication    acetaminophen (TYLENOL) 500 MG tablet    amphetamine-dextroamphetamine (ADDERALL) 15 MG tablet    famotidine (PEPCID) 20 MG tablet    insulin aspart (NOVOLOG VIAL) 100 UNITS/ML vial    Insulin Glargine-yfgn 100 UNIT/ML SOPN    KETOSTIX test strip    QUEtiapine (SEROQUEL) 25 MG tablet      Physical Exam:   Vitals: BP (!) 129/94 (BP Location: Right arm, Patient Position: Right side, Cuff Size: Adult Regular)   Pulse 108   LMP 01/29/2017   SpO2 100%    General: Seated comfortably in no acute distress.  HEENT: No paracervical muscle tenderness or tightness.  Optic discs sharp and vasculature normal on funduscopic exam.  No pain with eye movements or direct light.   Skin: No rashes but some skin mottling and reddened/greyish blanchable regions of her lower extremities b/l as well as distal hands/fingers.  Neurologic:     Mental Status: Fully alert, attentive and oriented.  Accurate historian today. Speech clear and fluent, no  paraphasic errors. Follows multiple step commands easily.     Cranial Nerves: Visual fields intact in all quadrants. PERRL. EOMI with normal smooth pursuit (no specific findings of KUN type movements).  Facial sensation intact/symmetric (no loss with pinprick or light touch differentiation). Facial movements symmetric, but at rest there may be mild nasolabial fold diminishment on the right. Hearing not formally tested but intact to conversation. Palate elevation symmetric, uvula midline. No dysarthria with phoneme (pa, ta, ca) as well as rapid alternation of phonemes. Shoulder shrug strong bilaterally. Tongue protrusion midline.     Motor: No tremors or other abnormal movements observed at rest, but with action of holding eyes closed there is notable muscle twitching and variable movements of the eyelids and other muscles of the body.  Muscle tone normal throughout (no spasticity noted today). No pronator drift. Right hand rapid hand movements are slowed with open-hand to closed hand. Fine motor movements of the right hand is also slow and incoordinated. Strength 5/5 throughout upper and lower extremities (large degree of give-way weakness noted on right side in almost all motor groups but the patient can give full resistance to movements if encouraged).      Deep Tendon Reflexes: 2+/symmetric throughout upper and lower extremities (no loss of reflexes, no hyperreflexia noted). No clonus. Toes downgoing bilaterally.     Sensory: vibration is intact fully in all extremities. Proprioception is normal in great toes b/l.  Pinprick to light touch differentiation is normal in upper extremities, but in the right lower extremity there is a patchy distribution of lack of pinprick reports in the anterior proximal foot without toe or lateral/medial foot involvement. Negative Romberg.      Coordination: Finger-nose-finger and heel-shin intact without dysmetria on the left, but the right side does have larger ataxic movements  during full motion as well as upon reaching target. Rapid alternating movements slightly slower on the right compared to the left.     Gait: Stands quickly from seated position without use of hands. Normal stance. When attempting to walk, the right thigh and quadriceps remain stiff and she circumduction to ambulate, with notable limp. Limp not necessarily noted with tandem walking, which is done with difficult and extra steps required on either leg (no listing or fall).          Data: Pertinent prior to visit   Imaging:  CT head and CTA head and neck were without abnormal findings upon review today         Assessment and Plan:   Assessment:  Acute onset sensory, motor deficits w/incoordination on the right as well as right visual field blurring    The patient's exam today is somewhat inconsistent, as there is little sign of true motor weakness or true sensory loss beyond some patchy sensation loss of her distal foot.  I wonder if her reports of weakness have more to do with incoordination than anything else, as she is walking with circumduction and seems to not trust the movements of her right leg and arm.  While this could be psychogenic in nature, I am not certain major neurological conditions have been ruled out as of yet.  Given the acuity of the onset, I do not feel that delaying this investigation is warranted and asked the patient to present to the ED for consideration of MRI brain and cervical spine to look for signs of demyelinating disease, as well as possible alternative etiologies like paraneoplastic disorders, autoimmune receptor encephalopathies, polypharmacy with quetiapine, or varied vasculitic diseases.  One issue of note, I am not certain about why she has such a blanchable exam on her skin testing, and wonder if this could relate to some of her symptoms if no neurological process is noted with MRI brain or cervical spine.     Plan:  - MRI brain and cervical spine w/wout contrast to be done in  expedited fashion at [facility] ED    Follow up in Neurology clinic in 3-6 months pending results of imaging/ED evaluation, or should new concerns arise.    Total time today (85 min) in this patient encounter was spent on pre-charting, counseling and/or coordination of care.  The patient is in agreement with this plan and has no further questions.    Again, thank you for allowing me to participate in the care of your patient.      Sincerely,    Bright Santiago, DO

## 2023-10-11 NOTE — TELEPHONE ENCOUNTER
RECORDS RECEIVED FROM: Care Everywhere   REASON FOR VISIT: Painful paresthesia   Date of Appt: 10/11/23 4:00 pm    NOTES (FOR ALL VISITS) STATUS DETAILS   OFFICE NOTE from referring provider Internal ED Referral    DISCHARGE REPORT from the ER Care Everywhere 10/10/23 Huong Herndon APRN CNP @[facility]    10/10/23 Matthew Schwartz MD @[facility] ED     MEDICATION LIST Internal    IMAGING  (FOR ALL VISITS)     CT (HEAD, NECK, SPINE) Internal [facility]  10/10/23 CTA Head Neck  10/10/23  CT Head

## 2023-10-12 ENCOUNTER — HOSPITAL ENCOUNTER (EMERGENCY)
Facility: CLINIC | Age: 38
Discharge: HOME | End: 2023-10-12
Payer: COMMERCIAL

## 2023-10-12 ENCOUNTER — TELEPHONE (OUTPATIENT)
Dept: NEUROLOGY | Facility: CLINIC | Age: 38
End: 2023-10-12
Payer: COMMERCIAL

## 2023-10-12 ENCOUNTER — HOSPITAL ENCOUNTER (OUTPATIENT)
Dept: MRI IMAGING | Facility: CLINIC | Age: 38
Discharge: HOME | End: 2023-10-12
Attending: STUDENT IN AN ORGANIZED HEALTH CARE EDUCATION/TRAINING PROGRAM
Payer: COMMERCIAL

## 2023-10-12 VITALS
HEART RATE: 93 BPM | RESPIRATION RATE: 14 BRPM | OXYGEN SATURATION: 100 % | SYSTOLIC BLOOD PRESSURE: 139 MMHG | TEMPERATURE: 98.6 F | DIASTOLIC BLOOD PRESSURE: 92 MMHG

## 2023-10-12 DIAGNOSIS — R29.898 RIGHT ARM WEAKNESS: Primary | ICD-10-CM

## 2023-10-12 DIAGNOSIS — R29.898 RIGHT ARM WEAKNESS: ICD-10-CM

## 2023-10-12 PROCEDURE — 99281 EMR DPT VST MAYX REQ PHY/QHP: CPT | Performed by: STUDENT IN AN ORGANIZED HEALTH CARE EDUCATION/TRAINING PROGRAM

## 2023-10-12 PROCEDURE — A9585 GADOBUTROL INJECTION: HCPCS | Mod: JZ | Performed by: STUDENT IN AN ORGANIZED HEALTH CARE EDUCATION/TRAINING PROGRAM

## 2023-10-12 PROCEDURE — 255N000002 HC RX 255 OP 636: Mod: JZ | Performed by: STUDENT IN AN ORGANIZED HEALTH CARE EDUCATION/TRAINING PROGRAM

## 2023-10-12 PROCEDURE — 70553 MRI BRAIN STEM W/O & W/DYE: CPT

## 2023-10-12 RX ORDER — GADOBUTROL 604.72 MG/ML
7.5 INJECTION INTRAVENOUS ONCE
Status: COMPLETED | OUTPATIENT
Start: 2023-10-12 | End: 2023-10-12

## 2023-10-12 RX ADMIN — GADOBUTROL 7.5 ML: 604.72 INJECTION INTRAVENOUS at 13:05

## 2023-10-12 NOTE — TELEPHONE ENCOUNTER
Patient Contacted for the patient to call back and schedule the following:    Appointment type: Return Video Visit  Provider: Dr. Santiago  Return date: Jan 2024  Specialty phone number: [phone]  Additional appointment(s) needed: N/A  Additonal Notes:    Pt states she is going to have her MRI done today and will call back to schedule    Alda Garcia on 10/12/2023 at 2:00 PM

## 2023-10-12 NOTE — ED NOTES
38-year-old female with a history of type 1 diabetes, ADHD, GERD, borderline personality disorder presenting with continued left hand weakness, right arm pain and right leg pain.  Patient was seen at [place] and left AMA and then seen in same day here and had CT head without as well as CT angio head and neck without any acute findings.  Patient seen neurology yesterday and noted MRI results to be scheduled.  And neurology reviewed notes there was that they will follow-up on results.  Patient has no new symptoms of aside from one as she had previously.  Outpatient order placed to allow patient to obtain MRI.  Return precautions discussed outpatient follow-up recommendations discussed and note sent to neurology that MRI was ordered to be completed today and for them to follow-up further on results.     Braeden Jimenez MD  10/12/23 2901

## 2023-10-12 NOTE — ED TRIAGE NOTES
"Pt was seen by a neurologist yesterday, was told she needs an MRI.  \"It is an emergency situation\"  Pt drove herself here     Triage Assessment (Adult)       Row Name 10/12/23 0953          Triage Assessment    Airway WDL WDL        Respiratory WDL    Respiratory WDL WDL        Cognitive/Neuro/Behavioral WDL    Cognitive/Neuro/Behavioral WDL WDL                     "

## 2023-10-13 ENCOUNTER — TELEPHONE (OUTPATIENT)
Dept: FAMILY MEDICINE | Facility: CLINIC | Age: 38
End: 2023-10-13
Payer: COMMERCIAL

## 2023-10-13 NOTE — TELEPHONE ENCOUNTER
Patient reports that she has been in the ED several times due to right sided numbness and weakness.  Patient had CT and MRI done to rule out stroke and both came back normal.  They also ruled out autoimmune disorders as well.    Patient realized that she broke a tooth in half last Tuesday; her bottom right lower back tooth.  She is having the following symptoms currently on the right side :  - swelling  - drooping on the right side of her face  - right sided arm and leg weakness and numbness  - Previous slurring  - she gets really warm at times but does not have a fever  - no pain unless she touches the tooth  - states she can see the nerve in the tooth    Patient has contacted several dentists in the area and is unable to get in urgently.  She could get in next week but is waiting to hear back from a dentist that is going to see if they can fit her in somewhere.    Patient has also contacted her PCP at [facility], however she is OOO due to emergency.    Due to patient not having a PCP within [place], recommended that patient follow up with a Dentist or go to ED for further follow up with her broken tooth and possible infection.  Patient has agreed to go to [facility] ED (restricted to this hospital) now.    Recommended that patient follow up with her PCP due to being restricted.    RN reviewed red flag symptoms with patient and when to seek emergency care.   Patient agreed and verbalized understanding.

## 2024-01-21 ENCOUNTER — HOSPITAL ENCOUNTER (EMERGENCY)
Facility: CLINIC | Age: 39
Discharge: HOME | End: 2024-01-22
Attending: FAMILY MEDICINE | Admitting: FAMILY MEDICINE
Payer: COMMERCIAL

## 2024-01-21 DIAGNOSIS — M62.838 MUSCLE SPASM OF SHOULDER REGION: ICD-10-CM

## 2024-01-21 DIAGNOSIS — R10.10 UPPER ABDOMINAL PAIN: ICD-10-CM

## 2024-01-21 DIAGNOSIS — E11.65 HYPERGLYCEMIA DUE TO DIABETES MELLITUS (H): ICD-10-CM

## 2024-01-21 LAB
ALBUMIN SERPL BCG-MCNC: 4.1 G/DL (ref 3.5–5.2)
ALP SERPL-CCNC: 93 U/L (ref 40–150)
ALT SERPL W P-5'-P-CCNC: 18 U/L (ref 0–50)
ANION GAP SERPL CALCULATED.3IONS-SCNC: 13 MMOL/L (ref 7–15)
AST SERPL W P-5'-P-CCNC: 19 U/L (ref 0–45)
B-OH-BUTYR SERPL-SCNC: <0.18 MMOL/L
BASE EXCESS BLDV CALC-SCNC: -1.4 MMOL/L (ref -3–3)
BASOPHILS # BLD AUTO: 0 10E3/UL (ref 0–0.2)
BASOPHILS NFR BLD AUTO: 1 %
BILIRUB SERPL-MCNC: 0.3 MG/DL
BUN SERPL-MCNC: 18 MG/DL (ref 6–20)
CALCIUM SERPL-MCNC: 9.2 MG/DL (ref 8.6–10)
CHLORIDE SERPL-SCNC: 103 MMOL/L (ref 98–107)
CREAT SERPL-MCNC: 0.68 MG/DL (ref 0.51–0.95)
CRP SERPL-MCNC: <3 MG/L
DEPRECATED HCO3 PLAS-SCNC: 21 MMOL/L (ref 22–29)
EGFRCR SERPLBLD CKD-EPI 2021: >90 ML/MIN/1.73M2
EOSINOPHIL # BLD AUTO: 0.1 10E3/UL (ref 0–0.7)
EOSINOPHIL NFR BLD AUTO: 1 %
ERYTHROCYTE [DISTWIDTH] IN BLOOD BY AUTOMATED COUNT: 13.1 % (ref 10–15)
GLUCOSE SERPL-MCNC: 259 MG/DL (ref 70–99)
HBA1C MFR BLD: 9 %
HCO3 BLDV-SCNC: 23 MMOL/L (ref 21–28)
HCT VFR BLD AUTO: 36.8 % (ref 35–47)
HGB BLD-MCNC: 12.6 G/DL (ref 11.7–15.7)
IMM GRANULOCYTES # BLD: 0 10E3/UL
IMM GRANULOCYTES NFR BLD: 0 %
LACTATE SERPL-SCNC: 2.1 MMOL/L (ref 0.7–2)
LIPASE SERPL-CCNC: 60 U/L (ref 13–60)
LYMPHOCYTES # BLD AUTO: 2.6 10E3/UL (ref 0.8–5.3)
LYMPHOCYTES NFR BLD AUTO: 43 %
MAGNESIUM SERPL-MCNC: 1.9 MG/DL (ref 1.7–2.3)
MCH RBC QN AUTO: 31 PG (ref 26.5–33)
MCHC RBC AUTO-ENTMCNC: 34.2 G/DL (ref 31.5–36.5)
MCV RBC AUTO: 90 FL (ref 78–100)
MONOCYTES # BLD AUTO: 0.4 10E3/UL (ref 0–1.3)
MONOCYTES NFR BLD AUTO: 7 %
NEUTROPHILS # BLD AUTO: 3 10E3/UL (ref 1.6–8.3)
NEUTROPHILS NFR BLD AUTO: 48 %
NRBC # BLD AUTO: 0 10E3/UL
NRBC BLD AUTO-RTO: 0 /100
O2/TOTAL GAS SETTING VFR VENT: 21 %
OXYHGB MFR BLDV: 84 % (ref 70–75)
PCO2 BLDV: 36 MM HG (ref 40–50)
PH BLDV: 7.42 [PH] (ref 7.32–7.43)
PHOSPHATE SERPL-MCNC: 4.8 MG/DL (ref 2.5–4.5)
PLATELET # BLD AUTO: 406 10E3/UL (ref 150–450)
PO2 BLDV: 53 MM HG (ref 25–47)
POTASSIUM SERPL-SCNC: 4.3 MMOL/L (ref 3.4–5.3)
PROT SERPL-MCNC: 7.2 G/DL (ref 6.4–8.3)
RBC # BLD AUTO: 4.07 10E6/UL (ref 3.8–5.2)
SAO2 % BLDV: 88.1 % (ref 70–75)
SODIUM SERPL-SCNC: 137 MMOL/L (ref 135–145)
TROPONIN T SERPL HS-MCNC: <6 NG/L
WBC # BLD AUTO: 6.2 10E3/UL (ref 4–11)

## 2024-01-21 PROCEDURE — 86140 C-REACTIVE PROTEIN: CPT | Performed by: FAMILY MEDICINE

## 2024-01-21 PROCEDURE — 99284 EMERGENCY DEPT VISIT MOD MDM: CPT | Performed by: FAMILY MEDICINE

## 2024-01-21 PROCEDURE — 83735 ASSAY OF MAGNESIUM: CPT | Performed by: FAMILY MEDICINE

## 2024-01-21 PROCEDURE — 258N000003 HC RX IP 258 OP 636: Performed by: FAMILY MEDICINE

## 2024-01-21 PROCEDURE — 99285 EMERGENCY DEPT VISIT HI MDM: CPT | Mod: 25 | Performed by: FAMILY MEDICINE

## 2024-01-21 PROCEDURE — 84484 ASSAY OF TROPONIN QUANT: CPT | Performed by: FAMILY MEDICINE

## 2024-01-21 PROCEDURE — 84100 ASSAY OF PHOSPHORUS: CPT | Performed by: FAMILY MEDICINE

## 2024-01-21 PROCEDURE — 83605 ASSAY OF LACTIC ACID: CPT | Performed by: FAMILY MEDICINE

## 2024-01-21 PROCEDURE — 82010 KETONE BODYS QUAN: CPT | Performed by: FAMILY MEDICINE

## 2024-01-21 PROCEDURE — 82805 BLOOD GASES W/O2 SATURATION: CPT | Performed by: FAMILY MEDICINE

## 2024-01-21 PROCEDURE — 250N000011 HC RX IP 250 OP 636: Performed by: FAMILY MEDICINE

## 2024-01-21 PROCEDURE — 96375 TX/PRO/DX INJ NEW DRUG ADDON: CPT | Performed by: FAMILY MEDICINE

## 2024-01-21 PROCEDURE — 36415 COLL VENOUS BLD VENIPUNCTURE: CPT | Performed by: FAMILY MEDICINE

## 2024-01-21 PROCEDURE — 83690 ASSAY OF LIPASE: CPT | Performed by: FAMILY MEDICINE

## 2024-01-21 PROCEDURE — 96361 HYDRATE IV INFUSION ADD-ON: CPT | Performed by: FAMILY MEDICINE

## 2024-01-21 PROCEDURE — 82040 ASSAY OF SERUM ALBUMIN: CPT | Performed by: FAMILY MEDICINE

## 2024-01-21 PROCEDURE — 82077 ASSAY SPEC XCP UR&BREATH IA: CPT | Performed by: FAMILY MEDICINE

## 2024-01-21 PROCEDURE — 85025 COMPLETE CBC W/AUTO DIFF WBC: CPT | Performed by: FAMILY MEDICINE

## 2024-01-21 PROCEDURE — 96374 THER/PROPH/DIAG INJ IV PUSH: CPT | Performed by: FAMILY MEDICINE

## 2024-01-21 PROCEDURE — 83036 HEMOGLOBIN GLYCOSYLATED A1C: CPT | Performed by: FAMILY MEDICINE

## 2024-01-21 RX ORDER — DEXTROSE MONOHYDRATE 25 G/50ML
25-50 INJECTION, SOLUTION INTRAVENOUS
Status: DISCONTINUED | OUTPATIENT
Start: 2024-01-21 | End: 2024-01-22 | Stop reason: HOSPADM

## 2024-01-21 RX ORDER — ONDANSETRON 2 MG/ML
4 INJECTION INTRAMUSCULAR; INTRAVENOUS EVERY 30 MIN PRN
Status: DISCONTINUED | OUTPATIENT
Start: 2024-01-21 | End: 2024-01-22 | Stop reason: HOSPADM

## 2024-01-21 RX ORDER — HYDROMORPHONE HYDROCHLORIDE 1 MG/ML
0.5 INJECTION, SOLUTION INTRAMUSCULAR; INTRAVENOUS; SUBCUTANEOUS EVERY 30 MIN PRN
Status: DISCONTINUED | OUTPATIENT
Start: 2024-01-21 | End: 2024-01-22 | Stop reason: HOSPADM

## 2024-01-21 RX ADMIN — ONDANSETRON 4 MG: 2 INJECTION INTRAMUSCULAR; INTRAVENOUS at 23:25

## 2024-01-21 RX ADMIN — SODIUM CHLORIDE 1000 ML: 9 INJECTION, SOLUTION INTRAVENOUS at 23:25

## 2024-01-21 RX ADMIN — HYDROMORPHONE HYDROCHLORIDE 0.5 MG: 1 INJECTION, SOLUTION INTRAMUSCULAR; INTRAVENOUS; SUBCUTANEOUS at 23:26

## 2024-01-21 ASSESSMENT — ACTIVITIES OF DAILY LIVING (ADL): ADLS_ACUITY_SCORE: 35

## 2024-01-22 ENCOUNTER — APPOINTMENT (OUTPATIENT)
Dept: CT IMAGING | Facility: CLINIC | Age: 39
End: 2024-01-22
Attending: FAMILY MEDICINE
Payer: COMMERCIAL

## 2024-01-22 VITALS
TEMPERATURE: 98.1 F | OXYGEN SATURATION: 97 % | DIASTOLIC BLOOD PRESSURE: 66 MMHG | HEIGHT: 66 IN | SYSTOLIC BLOOD PRESSURE: 122 MMHG | RESPIRATION RATE: 20 BRPM | HEART RATE: 96 BPM | BODY MASS INDEX: 22.5 KG/M2 | WEIGHT: 140 LBS

## 2024-01-22 LAB
ETHANOL SERPL-MCNC: <0.01 G/DL
LACTATE SERPL-SCNC: 0.5 MMOL/L (ref 0.7–2)

## 2024-01-22 PROCEDURE — 250N000011 HC RX IP 250 OP 636: Performed by: FAMILY MEDICINE

## 2024-01-22 PROCEDURE — 250N000009 HC RX 250: Performed by: FAMILY MEDICINE

## 2024-01-22 PROCEDURE — 96376 TX/PRO/DX INJ SAME DRUG ADON: CPT | Performed by: FAMILY MEDICINE

## 2024-01-22 PROCEDURE — 36415 COLL VENOUS BLD VENIPUNCTURE: CPT | Performed by: FAMILY MEDICINE

## 2024-01-22 PROCEDURE — 74177 CT ABD & PELVIS W/CONTRAST: CPT

## 2024-01-22 PROCEDURE — 83605 ASSAY OF LACTIC ACID: CPT | Performed by: FAMILY MEDICINE

## 2024-01-22 RX ORDER — IOPAMIDOL 755 MG/ML
500 INJECTION, SOLUTION INTRAVASCULAR ONCE
Status: COMPLETED | OUTPATIENT
Start: 2024-01-22 | End: 2024-01-22

## 2024-01-22 RX ADMIN — ONDANSETRON 4 MG: 2 INJECTION INTRAMUSCULAR; INTRAVENOUS at 01:06

## 2024-01-22 RX ADMIN — SODIUM CHLORIDE 60 ML: 9 INJECTION, SOLUTION INTRAVENOUS at 00:21

## 2024-01-22 RX ADMIN — HYDROMORPHONE HYDROCHLORIDE 0.5 MG: 1 INJECTION, SOLUTION INTRAMUSCULAR; INTRAVENOUS; SUBCUTANEOUS at 00:03

## 2024-01-22 RX ADMIN — IOPAMIDOL 70 ML: 755 INJECTION, SOLUTION INTRAVENOUS at 00:21

## 2024-01-22 ASSESSMENT — ENCOUNTER SYMPTOMS
VOMITING: 0
APPETITE CHANGE: 0
FEVER: 0
ABDOMINAL DISTENTION: 1
CONSTIPATION: 0
ABDOMINAL PAIN: 1
ANAL BLEEDING: 0
BLOOD IN STOOL: 0
NERVOUS/ANXIOUS: 1
HEMATOLOGIC/LYMPHATIC NEGATIVE: 1
DIARRHEA: 0
MYALGIAS: 1
CHILLS: 0
NEUROLOGICAL NEGATIVE: 1
NAUSEA: 1
EYES NEGATIVE: 1

## 2024-01-22 ASSESSMENT — ACTIVITIES OF DAILY LIVING (ADL): ADLS_ACUITY_SCORE: 35

## 2024-01-22 NOTE — ED PROVIDER NOTES
History     Chief Complaint   Patient presents with    Shoulder Pain     HPI  Heidi Su is a 38 year old female who presented emergency room with her significant other secondary concerns of bilateral shoulder pain.  She states the last time she had this type of pain she ended up having a perforated small bowel requiring surgery and became septic.  She noted some increased bloating to her belly in the last hour or so so now is concerned and wants to get checked out.  She states that she was sitting at a volleyball tournament this weekend with watching her daughter play volleyball and has noticed some increased cramping in her shoulders.  She also is concerned about the possibility of DKA as she has had similar cramping issues with her muscles when she is been in DKA before with her diabetes.  She denies any vomiting but admits to feeling nauseated.  She denies any bloody stools or emesis.  She denies any cough type illness or fever.  I asked her when we last checked her blood glucose and she stated she checked before coming to the ER about half an hour ago and it read 330.    Allergies:  Allergies   Allergen Reactions    No Known Allergies        Problem List:    Patient Active Problem List    Diagnosis Date Noted    Hypophosphatemia 05/21/2023     Priority: Medium    Hypokalemia 05/20/2023     Priority: Medium    Anemia, unspecified type 05/20/2023     Priority: Medium    Surgical abdomen 05/19/2023     Priority: Medium    Free intraperitoneal air 05/19/2023     Priority: Medium    Type 1 diabetes mellitus with hyperglycemia (H) 05/19/2023     Priority: Medium    Insulin pump status 05/19/2023     Priority: Medium    ADHD (attention deficit hyperactivity disorder) 05/19/2023     Priority: Medium    Depression with anxiety 05/19/2023     Priority: Medium    SIRS (systemic inflammatory response syndrome) (H) 05/19/2023     Priority: Medium    Diabetic ketoacidosis without coma associated with type 1 diabetes  mellitus (H) 05/19/2023     Priority: Medium    Thrombosis of right saphenous vein 05/19/2023     Priority: Medium    CARDIOVASCULAR SCREENING; LDL GOAL LESS THAN 160 04/03/2012     Priority: Medium        Past Medical History:    Past Medical History:   Diagnosis Date    Anxiety     Bronchitis     Depressive disorder     Diabetes (H)     NO ACTIVE PROBLEMS     Ovarian cyst        Past Surgical History:    Past Surgical History:   Procedure Laterality Date    APPENDECTOMY OPEN N/A 5/19/2023    Procedure: Open Appendectomy;  Surgeon: Jesse Blackman MD;  Location: [facility]    CYSTECTOMY OVARIAN BENIGN      DILATION AND CURETTAGE      DILATION AND CURETTAGE  2011    GYN SURGERY      HEMORRHOID SURGERY      LAPAROSCOPY DIAGNOSTIC (GENERAL) N/A 5/19/2023    Procedure: LAPAROSCOPY, DIAGNOSTIC;  Surgeon: Jesse Blackman MD;  Location: [facility]    LAPAROTOMY EXPLORATORY N/A 5/19/2023    Procedure: LAPAROTOMY;  Surgeon: Jesse Blackman MD;  Location: [facility]       Family History:    Family History   Problem Relation Age of Onset    Cerebrovascular Disease Maternal Grandfather     Breast Cancer Maternal Grandfather     Asthma Sister     Diabetes Sister 14       Social History:  Marital Status:  Single [1]  Social History     Tobacco Use    Smoking status: Every Day     Packs/day: .25     Types: Cigarettes    Smokeless tobacco: Never   Substance Use Topics    Alcohol use: Yes     Comment: 1-2x/month    Drug use: No        Medications:    acetaminophen (TYLENOL) 500 MG tablet  amphetamine-dextroamphetamine (ADDERALL) 15 MG tablet  blood glucose (CONTOUR NEXT TEST) test strip  Continuous Blood Gluc Sensor (DEXCOM G6 SENSOR) MISC  Continuous Blood Gluc Transmit (DEXCOM G6 TRANSMITTER) MISC  famotidine (PEPCID) 20 MG tablet  insulin aspart (NOVOLOG VIAL) 100 UNITS/ML vial  Insulin Glargine-yfgn 100 UNIT/ML SOPN  KETOSTIX test strip  QUEtiapine (SEROQUEL) 25 MG tablet          Review of Systems   Constitutional:  Negative for  "appetite change, chills and fever.   HENT: Negative.     Eyes: Negative.    Gastrointestinal:  Positive for abdominal distention, abdominal pain and nausea. Negative for anal bleeding, blood in stool, constipation, diarrhea and vomiting.   Genitourinary: Negative.    Musculoskeletal:  Positive for myalgias (Bilateral spasms in her shoulders similar to symptoms she has had when she had a perforated bowel and was told that the pain was referred from her abdomen.).   Skin:  Negative for rash.   Neurological: Negative.    Hematological: Negative.    Psychiatric/Behavioral:  The patient is nervous/anxious.    All other systems reviewed and are negative.      Physical Exam   BP: (!) 142/100  Pulse: 109  Temp: 98.1  F (36.7  C)  Resp: 22  Height: 167.6 cm (5' 6\")  Weight: 63.5 kg (140 lb)  SpO2: 100 %      Physical Exam  Vitals and nursing note reviewed. Exam conducted with a chaperone present (S.O.).   Constitutional:       General: She is in acute distress.      Comments: I did not notice any smell of alcohol and or ketones on the patient's breath.   HENT:      Head: Normocephalic and atraumatic.      Nose: Nose normal.      Mouth/Throat:      Mouth: Mucous membranes are moist.      Pharynx: No oropharyngeal exudate or posterior oropharyngeal erythema.   Eyes:      General: No scleral icterus.     Extraocular Movements: Extraocular movements intact.      Conjunctiva/sclera: Conjunctivae normal.      Pupils: Pupils are equal, round, and reactive to light.   Cardiovascular:      Rate and Rhythm: Normal rate.      Pulses: Normal pulses.   Pulmonary:      Effort: Pulmonary effort is normal. No respiratory distress.      Breath sounds: Normal breath sounds. No wheezing, rhonchi or rales.   Abdominal:      General: There is distension (Mild - upper).      Palpations: There is no mass.      Tenderness: There is abdominal tenderness (Upper). There is no right CVA tenderness, left CVA tenderness, guarding or rebound. "   Musculoskeletal:         General: Normal range of motion.      Cervical back: Normal range of motion and neck supple.   Skin:     General: Skin is warm.      Capillary Refill: Capillary refill takes less than 2 seconds.      Findings: No bruising or rash.   Neurological:      Mental Status: She is alert and oriented to person, place, and time.   Psychiatric:         Mood and Affect: Mood is anxious.         Speech: Speech normal.         Behavior: Behavior normal. Behavior is cooperative.         Thought Content: Thought content normal.         Cognition and Memory: Cognition normal.         ED Course                 Procedures              Critical Care time:  none               Results for orders placed or performed during the hospital encounter of 01/21/24 (from the past 24 hour(s))   CBC with platelets differential    Narrative    The following orders were created for panel order CBC with platelets differential.  Procedure                               Abnormality         Status                     ---------                               -----------         ------                     CBC with platelets and d...[[id number]]                      Final result                 Please view results for these tests on the individual orders.   Comprehensive metabolic panel   Result Value Ref Range    Sodium 137 135 - 145 mmol/L    Potassium 4.3 3.4 - 5.3 mmol/L    Carbon Dioxide (CO2) 21 (L) 22 - 29 mmol/L    Anion Gap 13 7 - 15 mmol/L    Urea Nitrogen 18.0 6.0 - 20.0 mg/dL    Creatinine 0.68 0.51 - 0.95 mg/dL    GFR Estimate >90 >60 mL/min/1.73m2    Calcium 9.2 8.6 - 10.0 mg/dL    Chloride 103 98 - 107 mmol/L    Glucose 259 (H) 70 - 99 mg/dL    Alkaline Phosphatase 93 40 - 150 U/L    AST 19 0 - 45 U/L    ALT 18 0 - 50 U/L    Protein Total 7.2 6.4 - 8.3 g/dL    Albumin 4.1 3.5 - 5.2 g/dL    Bilirubin Total 0.3 <=1.2 mg/dL   Lipase   Result Value Ref Range    Lipase 60 13 - 60 U/L   Lactic acid whole blood w/ reflex x1  in 3 Hr when >2   Result Value Ref Range    Lactic Acid, Initial 2.1 (H) 0.7 - 2.0 mmol/L   Blood gas venous   Result Value Ref Range    pH Venous 7.42 7.32 - 7.43    pCO2 Venous 36 (L) 40 - 50 mm Hg    pO2 Venous 53 (H) 25 - 47 mm Hg    Bicarbonate Venous 23 21 - 28 mmol/L    Base Excess/Deficit Venous -1.4 -3.0 - 3.0 mmol/L    FIO2 21     Oxyhemoglobin Venous 84 (H) 70 - 75 %    O2 Sat, Venous 88.1 (H) 70.0 - 75.0 %    Narrative    In healthy individuals, oxyhemoglobin (O2Hb) and oxygen saturation (SO2) are approximately equal. In the presence of dyshemoglobins, oxyhemoglobin can be considerably lower than oxygen saturation.   CRP inflammation   Result Value Ref Range    CRP Inflammation <3.00 <5.00 mg/L   Magnesium   Result Value Ref Range    Magnesium 1.9 1.7 - 2.3 mg/dL   Hemoglobin A1c   Result Value Ref Range    Hemoglobin A1C 9.0 (H) <5.7 %   Ketone Beta-Hydroxybutyrate Quantitative   Result Value Ref Range    Ketone (Beta-Hydroxybutyrate) Quantitative <0.18 <=0.30 mmol/L   Phosphorus   Result Value Ref Range    Phosphorus 4.8 (H) 2.5 - 4.5 mg/dL   Troponin T, High Sensitivity   Result Value Ref Range    Troponin T, High Sensitivity <6 <=14 ng/L   CBC with platelets and differential   Result Value Ref Range    WBC Count 6.2 4.0 - 11.0 10e3/uL    RBC Count 4.07 3.80 - 5.20 10e6/uL    Hemoglobin 12.6 11.7 - 15.7 g/dL    Hematocrit 36.8 35.0 - 47.0 %    MCV 90 78 - 100 fL    MCH 31.0 26.5 - 33.0 pg    MCHC 34.2 31.5 - 36.5 g/dL    RDW 13.1 10.0 - 15.0 %    Platelet Count 406 150 - 450 10e3/uL    % Neutrophils 48 %    % Lymphocytes 43 %    % Monocytes 7 %    % Eosinophils 1 %    % Basophils 1 %    % Immature Granulocytes 0 %    NRBCs per 100 WBC 0 <1 /100    Absolute Neutrophils 3.0 1.6 - 8.3 10e3/uL    Absolute Lymphocytes 2.6 0.8 - 5.3 10e3/uL    Absolute Monocytes 0.4 0.0 - 1.3 10e3/uL    Absolute Eosinophils 0.1 0.0 - 0.7 10e3/uL    Absolute Basophils 0.0 0.0 - 0.2 10e3/uL    Absolute Immature Granulocytes  0.0 <=0.4 10e3/uL    Absolute NRBCs 0.0 10e3/uL   Ethyl Alcohol Level   Result Value Ref Range    Alcohol ethyl <0.01 <=0.01 g/dL   Urine Drug Screen *Canceled*    Narrative    The following orders were created for panel order Urine Drug Screen.  Procedure                               Abnormality         Status                     ---------                               -----------         ------                       Please view results for these tests on the individual orders.   CT Abdomen Pelvis w Contrast    Narrative    EXAM: CT ABDOMEN PELVIS W CONTRAST  LOCATION: [facility]  DATE: 1/22/2024    INDICATION: Abdominal pain and bloating was referred pain to the shoulder.  Similar symptoms in past associated with bowel perforation.  COMPARISON: CT from 06/24/2023.  TECHNIQUE: CT scan of the abdomen and pelvis was performed following injection of IV contrast. Multiplanar reformats were obtained. Dose reduction techniques were used.  CONTRAST: 70 mL Isovue 370    FINDINGS:   LOWER CHEST: Normal.    HEPATOBILIARY: Normal.    PANCREAS: Pancreas divisum. No inflammatory change.    SPLEEN: Normal.    ADRENAL GLANDS: Normal.    KIDNEYS/BLADDER: Normal.    BOWEL: Large amount of presumed ingested material in the stomach. Normal caliber small bowel. Moderate amount of colonic stool. Absent appendix. No free air.    LYMPH NODES: Normal.    VASCULATURE: Unremarkable.    PELVIC ORGANS: Absent uterus. No free fluid.    MUSCULOSKELETAL: No acute osseous finding. Small metallic structure within the superficial aspect of the right anterior abdominal wall subcutaneous tissues on image 106 of series 3, recommend correlation for medical device.      Impression    IMPRESSION:   1.  Large amount of presumed ingested material in the stomach. Moderate amount of colonic stool. No free air.    2.  No focal inflammatory change.   Lactic acid whole blood   Result Value Ref Range    Lactic Acid 0.5 (L) 0.7 -  2.0 mmol/L       Medications   sodium chloride 0.9% BOLUS 1,000 mL (0 mLs Intravenous Stopped 1/22/24 0007)   iopamidol (ISOVUE-370) solution 500 mL (70 mLs Intravenous $Given 1/22/24 0021)   sodium chloride 0.9 % bag 100mL for CT scan flush use (60 mLs Intravenous $Given 1/22/24 0021)       Assessments & Plan (with Medical Decision Making)  38-year-old female to the ER secondary to concerns of developing symptoms similar to symptoms she had when she had a perforated bowel.  She has had some shoulder spasm and discomfort associated with abdominal bloating.  She also noted that her glucose was elevated about a half an hour before coming to the ER.  She admits that she has been at a volleyball tournament all weekend.  Patient also concerned about possible DKA and she is dealt with that in the past as well and has had some similar symptoms associated with those events.  Fortunately, the patient did not show evidence of DKA or bowel perforation.  Patient's symptoms improved during the ER stay to the point where she desired return to home.  Patient's blood glucose was elevated earlier today per the patient's report and I suspect that this may have caused some dehydration.  Patient admitted to eating a large amount of popcorn while watching her daughter play volleyball at a local volleyball tournament.  This may explain the patient's sensation of abdominal bloating and the findings on the CT scan.  She was instructed to increase her fluid intake.  Encouraged her to return to the ER should she have any increase or worsening of symptoms.  Patient states that she has a good supply of antiemetic medications available to her at home.  She will monitor her glucose and her diabetic diet closely.  Encourage follow-up in our clinic for recheck of her diabetes given her elevated hemoglobin A1c level.     I have reviewed the nursing notes.    I have reviewed the findings, diagnosis, plan and need for follow up with the patient.             I verbally discussed the findings of the evaluation today in the ER. I have verbally discussed with Heidi the suggested treatment(s) as described in the discharge instructions and handouts.    I have verbally suggested she follow-up in her clinic or return to the ER for increased symptoms. See the follow-up recommendations documented  in the after visit summary in this visit's EPIC chart.      Disclaimer: This note consists of words and symbols derived from keyboarding and dictation using voice recognition software.  As a result, there may be errors that have gone undetected.  Please consider this when interpreting information found in this note.    Final diagnoses:   Upper abdominal pain   Hyperglycemia due to diabetes mellitus (H)   Muscle spasm of shoulder region       1/21/2024   [facility] EMERGENCY DEPT       Richard Levine,   01/22/24 0544

## 2024-01-22 NOTE — ED TRIAGE NOTES
Pt comes in today c/o bilateral shoulder pain, increased HR, generalized cramping, and nausea. Pt states that she was septic when this happened in the past. Hx of DKA     Triage Assessment (Adult)       Row Name 01/21/24 3424          Triage Assessment    Airway WDL WDL        Respiratory WDL    Respiratory WDL WDL        Skin Circulation/Temperature WDL    Skin Circulation/Temperature WDL WDL        Cardiac WDL    Cardiac WDL X     Cardiac Rhythm ST        Peripheral/Neurovascular WDL    Peripheral Neurovascular WDL WDL        Cognitive/Neuro/Behavioral WDL    Cognitive/Neuro/Behavioral WDL WDL        Pupils (CN II)    Pupil PERRLA yes

## 2024-03-20 ENCOUNTER — APPOINTMENT (OUTPATIENT)
Dept: CT IMAGING | Facility: CLINIC | Age: 39
End: 2024-03-20
Attending: FAMILY MEDICINE
Payer: COMMERCIAL

## 2024-03-20 ENCOUNTER — HOSPITAL ENCOUNTER (EMERGENCY)
Facility: CLINIC | Age: 39
Discharge: HOME | End: 2024-03-20
Attending: FAMILY MEDICINE | Admitting: FAMILY MEDICINE
Payer: COMMERCIAL

## 2024-03-20 VITALS
OXYGEN SATURATION: 98 % | DIASTOLIC BLOOD PRESSURE: 86 MMHG | RESPIRATION RATE: 20 BRPM | BODY MASS INDEX: 21.81 KG/M2 | HEART RATE: 90 BPM | SYSTOLIC BLOOD PRESSURE: 125 MMHG | WEIGHT: 135.1 LBS | TEMPERATURE: 98 F

## 2024-03-20 DIAGNOSIS — R10.13 ABDOMINAL PAIN, EPIGASTRIC: ICD-10-CM

## 2024-03-20 DIAGNOSIS — R07.89 ATYPICAL CHEST PAIN: ICD-10-CM

## 2024-03-20 LAB
ALBUMIN SERPL BCG-MCNC: 4.9 G/DL (ref 3.5–5.2)
ALBUMIN UR-MCNC: NEGATIVE MG/DL
ALP SERPL-CCNC: 99 U/L (ref 40–150)
ALT SERPL W P-5'-P-CCNC: 14 U/L (ref 0–50)
ANION GAP SERPL CALCULATED.3IONS-SCNC: 12 MMOL/L (ref 7–15)
APPEARANCE UR: CLEAR
AST SERPL W P-5'-P-CCNC: 13 U/L (ref 0–45)
BASOPHILS # BLD AUTO: 0 10E3/UL (ref 0–0.2)
BASOPHILS NFR BLD AUTO: 0 %
BILIRUB SERPL-MCNC: 0.3 MG/DL
BILIRUB UR QL STRIP: NEGATIVE
BUN SERPL-MCNC: 11.8 MG/DL (ref 6–20)
CALCIUM SERPL-MCNC: 9.8 MG/DL (ref 8.6–10)
CHLORIDE SERPL-SCNC: 102 MMOL/L (ref 98–107)
COLOR UR AUTO: YELLOW
CREAT SERPL-MCNC: 0.69 MG/DL (ref 0.51–0.95)
D DIMER PPP FEU-MCNC: 0.35 UG/ML FEU (ref 0–0.5)
DEPRECATED HCO3 PLAS-SCNC: 24 MMOL/L (ref 22–29)
EGFRCR SERPLBLD CKD-EPI 2021: >90 ML/MIN/1.73M2
EOSINOPHIL # BLD AUTO: 0.2 10E3/UL (ref 0–0.7)
EOSINOPHIL NFR BLD AUTO: 2 %
ERYTHROCYTE [DISTWIDTH] IN BLOOD BY AUTOMATED COUNT: 13.2 % (ref 10–15)
GLUCOSE SERPL-MCNC: 200 MG/DL (ref 70–99)
GLUCOSE UR STRIP-MCNC: 150 MG/DL
HCT VFR BLD AUTO: 39.5 % (ref 35–47)
HGB BLD-MCNC: 13.5 G/DL (ref 11.7–15.7)
HGB UR QL STRIP: NEGATIVE
HOLD SPECIMEN: NORMAL
HYALINE CASTS: 5 /LPF
IMM GRANULOCYTES # BLD: 0 10E3/UL
IMM GRANULOCYTES NFR BLD: 0 %
KETONES UR STRIP-MCNC: 5 MG/DL
LACTATE SERPL-SCNC: 1 MMOL/L (ref 0.7–2)
LEUKOCYTE ESTERASE UR QL STRIP: NEGATIVE
LIPASE SERPL-CCNC: 42 U/L (ref 13–60)
LYMPHOCYTES # BLD AUTO: 2.3 10E3/UL (ref 0.8–5.3)
LYMPHOCYTES NFR BLD AUTO: 30 %
MCH RBC QN AUTO: 31.1 PG (ref 26.5–33)
MCHC RBC AUTO-ENTMCNC: 34.2 G/DL (ref 31.5–36.5)
MCV RBC AUTO: 91 FL (ref 78–100)
MONOCYTES # BLD AUTO: 0.5 10E3/UL (ref 0–1.3)
MONOCYTES NFR BLD AUTO: 7 %
MUCOUS THREADS #/AREA URNS LPF: PRESENT /LPF
NEUTROPHILS # BLD AUTO: 4.6 10E3/UL (ref 1.6–8.3)
NEUTROPHILS NFR BLD AUTO: 60 %
NITRATE UR QL: NEGATIVE
NRBC # BLD AUTO: 0 10E3/UL
NRBC BLD AUTO-RTO: 0 /100
PH UR STRIP: 5 [PH] (ref 5–7)
PLATELET # BLD AUTO: 342 10E3/UL (ref 150–450)
POTASSIUM SERPL-SCNC: 3.7 MMOL/L (ref 3.4–5.3)
PROT SERPL-MCNC: 8.2 G/DL (ref 6.4–8.3)
RBC # BLD AUTO: 4.34 10E6/UL (ref 3.8–5.2)
RBC URINE: 1 /HPF
SODIUM SERPL-SCNC: 138 MMOL/L (ref 135–145)
SP GR UR STRIP: 1.03 (ref 1–1.03)
TROPONIN T SERPL HS-MCNC: 7 NG/L
UROBILINOGEN UR STRIP-MCNC: NORMAL MG/DL
WBC # BLD AUTO: 7.6 10E3/UL (ref 4–11)
WBC URINE: 1 /HPF

## 2024-03-20 PROCEDURE — 83605 ASSAY OF LACTIC ACID: CPT | Performed by: FAMILY MEDICINE

## 2024-03-20 PROCEDURE — 85379 FIBRIN DEGRADATION QUANT: CPT | Performed by: FAMILY MEDICINE

## 2024-03-20 PROCEDURE — 85025 COMPLETE CBC W/AUTO DIFF WBC: CPT | Performed by: FAMILY MEDICINE

## 2024-03-20 PROCEDURE — 99285 EMERGENCY DEPT VISIT HI MDM: CPT | Mod: 25 | Performed by: FAMILY MEDICINE

## 2024-03-20 PROCEDURE — 93010 ELECTROCARDIOGRAM REPORT: CPT | Performed by: FAMILY MEDICINE

## 2024-03-20 PROCEDURE — 74176 CT ABD & PELVIS W/O CONTRAST: CPT

## 2024-03-20 PROCEDURE — 83690 ASSAY OF LIPASE: CPT | Performed by: FAMILY MEDICINE

## 2024-03-20 PROCEDURE — 93005 ELECTROCARDIOGRAM TRACING: CPT | Performed by: FAMILY MEDICINE

## 2024-03-20 PROCEDURE — 36415 COLL VENOUS BLD VENIPUNCTURE: CPT | Performed by: FAMILY MEDICINE

## 2024-03-20 PROCEDURE — 84484 ASSAY OF TROPONIN QUANT: CPT | Performed by: FAMILY MEDICINE

## 2024-03-20 PROCEDURE — 80053 COMPREHEN METABOLIC PANEL: CPT | Performed by: FAMILY MEDICINE

## 2024-03-20 PROCEDURE — 99284 EMERGENCY DEPT VISIT MOD MDM: CPT | Mod: 25 | Performed by: FAMILY MEDICINE

## 2024-03-20 PROCEDURE — 81003 URINALYSIS AUTO W/O SCOPE: CPT | Performed by: FAMILY MEDICINE

## 2024-03-20 ASSESSMENT — ACTIVITIES OF DAILY LIVING (ADL)
ADLS_ACUITY_SCORE: 33
ADLS_ACUITY_SCORE: 35
ADLS_ACUITY_SCORE: 35

## 2024-03-20 ASSESSMENT — COLUMBIA-SUICIDE SEVERITY RATING SCALE - C-SSRS
1. IN THE PAST MONTH, HAVE YOU WISHED YOU WERE DEAD OR WISHED YOU COULD GO TO SLEEP AND NOT WAKE UP?: NO
2. HAVE YOU ACTUALLY HAD ANY THOUGHTS OF KILLING YOURSELF IN THE PAST MONTH?: NO

## 2024-03-20 NOTE — ED TRIAGE NOTES
"Pt has history of Pulmonary embolism and having \"free air\" in her colon.  Pt went septic last time she had abdominal pain.  Pt is in tears with pain.  Alert and oriented.     Triage Assessment (Adult)       Row Name 03/20/24 2663          Triage Assessment    Airway WDL WDL        Respiratory WDL    Respiratory WDL X;rhythm/pattern     Rhythm/Pattern, Respiratory tachypneic        Cognitive/Neuro/Behavioral WDL    Cognitive/Neuro/Behavioral WDL WDL                     "

## 2024-03-21 NOTE — ED PROVIDER NOTES
[facility] ED Provider Note   Patient: Heidi Su  MRN #:  [id number]  Date of Visit: March 20, 2024    CC:     Chief Complaint   Patient presents with    Chest Pain    Abdominal Pain     HPI:  Heidi Su is a 38 year old female type I diabetic who presented to the emergency department with acute onset of right upper quadrant abdominal pain with radiation into the chest and shortness of breath.  Patient was having an unremarkable day until about 45 minutes ago.  She went to  her prescription for sinus infection.  She took her first 2 tablets of Zithromax and within 5 minutes, she had severe pain in the epigastric, right upper quadrant area and into the chest.  She felt short of breath.  This is similar to how she felt in May of last year when she was discovered to have free air in the abdominal region.  It was later found during exploratory laparotomy that she had a serosal tear of the small intestine.  She had an incidental appendectomy at that time.  Patient states that she had a virtual visit earlier this afternoon because she was having symptoms of recurrent sinusitis.  She had a virtual visit with her primary care provider, and despite going through various hardships, including breaking up with her ex and moving out with her kids, she seems to be doing well.  Patient describes persistent severe pain in the right upper quadrant and chest.  She has not had any nausea, vomiting or diarrhea.  At the end of February she had several days where she had bloody diarrhea, and was thought to be due to a flareup of her diverticular disease.  Patient reports 20-30 pounds of unexpected weight loss over the last 3 months.  She has been under more stress over the last couple of weeks but there has been no change otherwise in her diet or activity.    Problem List:  Patient Active Problem List    Diagnosis Date Noted    Hypophosphatemia  05/21/2023     Priority: Medium    Hypokalemia 05/20/2023     Priority: Medium    Anemia, unspecified type 05/20/2023     Priority: Medium    Surgical abdomen 05/19/2023     Priority: Medium    Free intraperitoneal air 05/19/2023     Priority: Medium    Type 1 diabetes mellitus with hyperglycemia (H) 05/19/2023     Priority: Medium    Insulin pump status 05/19/2023     Priority: Medium    ADHD (attention deficit hyperactivity disorder) 05/19/2023     Priority: Medium    Depression with anxiety 05/19/2023     Priority: Medium    SIRS (systemic inflammatory response syndrome) (H) 05/19/2023     Priority: Medium    Diabetic ketoacidosis without coma associated with type 1 diabetes mellitus (H) 05/19/2023     Priority: Medium    Thrombosis of right saphenous vein 05/19/2023     Priority: Medium    CARDIOVASCULAR SCREENING; LDL GOAL LESS THAN 160 04/03/2012     Priority: Medium       Past Medical History:   Diagnosis Date    Anxiety     Bronchitis     Depressive disorder     Diabetes (H)     NO ACTIVE PROBLEMS     Ovarian cyst        MEDS: acetaminophen (TYLENOL) 500 MG tablet  amphetamine-dextroamphetamine (ADDERALL) 15 MG tablet  blood glucose (CONTOUR NEXT TEST) test strip  Continuous Blood Gluc Sensor (DEXCOM G6 SENSOR) MISC  Continuous Blood Gluc Transmit (DEXCOM G6 TRANSMITTER) MISC  famotidine (PEPCID) 20 MG tablet  insulin aspart (NOVOLOG VIAL) 100 UNITS/ML vial  Insulin Glargine-yfgn 100 UNIT/ML SOPN  KETOSTIX test strip  QUEtiapine (SEROQUEL) 25 MG tablet        ALLERGIES:    Allergies   Allergen Reactions    No Known Allergies        Past Surgical History:   Procedure Laterality Date    APPENDECTOMY OPEN N/A 5/19/2023    Procedure: Open Appendectomy;  Surgeon: Jesse Blackman MD;  Location: [facility] OR    CYSTECTOMY OVARIAN BENIGN      DILATION AND CURETTAGE      DILATION AND CURETTAGE  2011    GYN SURGERY      HEMORRHOID SURGERY      LAPAROSCOPY DIAGNOSTIC (GENERAL) N/A 5/19/2023    Procedure: LAPAROSCOPY,  DIAGNOSTIC;  Surgeon: Jesse Blackman MD;  Location: [facility] OR    LAPAROTOMY EXPLORATORY N/A 5/19/2023    Procedure: LAPAROTOMY;  Surgeon: Jesse Blackman MD;  Location: [facility] OR       Social History     Tobacco Use    Smoking status: Every Day     Packs/day: .25     Types: Cigarettes    Smokeless tobacco: Never   Substance Use Topics    Alcohol use: Yes     Comment: 1-2x/month    Drug use: No         Review of Systems   Except as noted in HPI, all other systems were reviewed and are negative    Physical Exam   Vitals were reviewed  Patient Vitals for the past 12 hrs:   BP Temp Temp src Pulse Resp SpO2 Weight   03/20/24 2000 125/86 -- -- 90 20 98 % --   03/20/24 1857 (!) 140/78 -- -- -- -- -- --   03/20/24 1855 -- 98  F (36.7  C) Oral 102 20 100 % 61.3 kg (135 lb 1.6 oz)     GENERAL APPEARANCE: Alert and oriented x 3, no acute distress, she was tearful in triage.  FACE: normal facies  EYES: Pupils are equal; sclera is nonicteric  HENT: normal external exam; upper dentures in place, oral exam was otherwise benign  NECK: no adenopathy or asymmetry  RESP: normal respiratory effort; clear breath sounds bilaterally  CV: regular rate and rhythm; no significant murmurs, gallops or rubs  ABD: soft, gastric and right upper quadrant tenderness; no rebound or guarding; bowel sounds are normal; midline abdominal incision is well-healed  MS: no gross deformities noted; normal muscle tone.  EXT: No calf tenderness or pitting edema  SKIN: no worrisome rash  NEURO: no facial droop; no focal deficits, speech is normal        Available Lab/Imaging Results     Results for orders placed or performed during the hospital encounter of 03/20/24 (from the past 24 hour(s))   [place] Draw    Narrative    The following orders were created for panel order [place] [facility].  Procedure                               Abnormality         Status                     ---------                               -----------         ------                     Extra  Blue Top Tube[[id number]]                              Final result               Extra Green Top (Lithium...[[id number]]                      Final result               Extra Purple Top Tube[[id number]]                            Final result                 Please view results for these tests on the individual orders.   Extra Blue Top Tube   Result Value Ref Range    Hold Specimen JIC    Extra Green Top (Lithium Heparin) Tube   Result Value Ref Range    Hold Specimen JIC    Extra Purple Top Tube   Result Value Ref Range    Hold Specimen JIC    CBC with platelets differential    Narrative    The following orders were created for panel order CBC with platelets differential.  Procedure                               Abnormality         Status                     ---------                               -----------         ------                     CBC with platelets and d...[[id number]]                      Final result                 Please view results for these tests on the individual orders.   Comprehensive metabolic panel   Result Value Ref Range    Sodium 138 135 - 145 mmol/L    Potassium 3.7 3.4 - 5.3 mmol/L    Carbon Dioxide (CO2) 24 22 - 29 mmol/L    Anion Gap 12 7 - 15 mmol/L    Urea Nitrogen 11.8 6.0 - 20.0 mg/dL    Creatinine 0.69 0.51 - 0.95 mg/dL    GFR Estimate >90 >60 mL/min/1.73m2    Calcium 9.8 8.6 - 10.0 mg/dL    Chloride 102 98 - 107 mmol/L    Glucose 200 (H) 70 - 99 mg/dL    Alkaline Phosphatase 99 40 - 150 U/L    AST 13 0 - 45 U/L    ALT 14 0 - 50 U/L    Protein Total 8.2 6.4 - 8.3 g/dL    Albumin 4.9 3.5 - 5.2 g/dL    Bilirubin Total 0.3 <=1.2 mg/dL   Troponin T, High Sensitivity   Result Value Ref Range    Troponin T, High Sensitivity 7 <=14 ng/L   CBC with platelets and differential   Result Value Ref Range    WBC Count 7.6 4.0 - 11.0 10e3/uL    RBC Count 4.34 3.80 - 5.20 10e6/uL    Hemoglobin 13.5 11.7 - 15.7 g/dL    Hematocrit 39.5 35.0 - 47.0 %    MCV 91 78 - 100 fL    MCH 31.1 26.5 - 33.0 pg     MCHC 34.2 31.5 - 36.5 g/dL    RDW 13.2 10.0 - 15.0 %    Platelet Count 342 150 - 450 10e3/uL    % Neutrophils 60 %    % Lymphocytes 30 %    % Monocytes 7 %    % Eosinophils 2 %    % Basophils 0 %    % Immature Granulocytes 0 %    NRBCs per 100 WBC 0 <1 /100    Absolute Neutrophils 4.6 1.6 - 8.3 10e3/uL    Absolute Lymphocytes 2.3 0.8 - 5.3 10e3/uL    Absolute Monocytes 0.5 0.0 - 1.3 10e3/uL    Absolute Eosinophils 0.2 0.0 - 0.7 10e3/uL    Absolute Basophils 0.0 0.0 - 0.2 10e3/uL    Absolute Immature Granulocytes 0.0 <=0.4 10e3/uL    Absolute NRBCs 0.0 10e3/uL   D dimer quantitative   Result Value Ref Range    D-Dimer Quantitative 0.35 0.00 - 0.50 ug/mL FEU    Narrative    This D-dimer assay is intended for use in conjunction with a clinical pretest probability assessment model to exclude pulmonary embolism (PE) and deep venous thrombosis (DVT) in outpatients suspected of PE or DVT. The cut-off value is 0.50 ug/mL FEU.   Lipase   Result Value Ref Range    Lipase 42 13 - 60 U/L   Lactic acid whole blood   Result Value Ref Range    Lactic Acid 1.0 0.7 - 2.0 mmol/L   UA with Microscopic reflex to Culture    Specimen: Urine, Midstream   Result Value Ref Range    Color Urine Yellow Colorless, Straw, Light Yellow, Yellow    Appearance Urine Clear Clear    Glucose Urine 150 (A) Negative mg/dL    Bilirubin Urine Negative Negative    Ketones Urine 5 (A) Negative mg/dL    Specific Gravity Urine 1.034 1.003 - 1.035    Blood Urine Negative Negative    pH Urine 5.0 5.0 - 7.0    Protein Albumin Urine Negative Negative mg/dL    Urobilinogen Urine Normal Normal, 2.0 mg/dL    Nitrite Urine Negative Negative    Leukocyte Esterase Urine Negative Negative    Mucus Urine Present (A) None Seen /LPF    RBC Urine 1 <=2 /HPF    WBC Urine 1 <=5 /HPF    Hyaline Casts Urine 5 (H) <=2 /LPF    Narrative    Urine Culture not indicated   Abd/pelvis CT - no contrast - [facility] Protocol    Narrative    EXAM: CT ABDOMEN PELVIS W/O CONTRAST  LOCATION:  [facility]  DATE: 3/20/2024    INDICATION: Acute upper abdominal pain; history of perforated ileum  COMPARISON: 01/22/2024  TECHNIQUE: CT scan of the abdomen and pelvis was performed without IV contrast. Multiplanar reformats were obtained. Dose reduction techniques were used.  CONTRAST: None.    FINDINGS:   LOWER CHEST: Normal.    HEPATOBILIARY: Normal.    PANCREAS: Normal.    SPLEEN: Normal.    ADRENAL GLANDS: Normal.    KIDNEYS/BLADDER: Normal.    BOWEL: No free air, free fluid, or inflammatory change. No obstruction. Appendix not identified.    LYMPH NODES: Normal.    VASCULATURE: Normal.    PELVIC ORGANS: Normal.    MUSCULOSKELETAL: Normal.      Impression    IMPRESSION:   1.  No acute abnormality in the abdomen or pelvis.         EKG reviewed by me: Normal sinus rhythm with heart rate of 92.  NH interval of 146 ms, QT interval of 332 ms, QRS duration of 92 ms, normal axis.  Impression: Normal ECG.  No acute ischemic changes.         Impression     Final diagnoses:   Abdominal pain, epigastric   Atypical chest pain         ED Course & Medical Decision Making   Heidi Su is a 38 year old female who presented to the emergency department with acute onset of epigastric and right upper quadrant abdominal pain radiating into her chest that started 45 minutes prior to arrival.  Patient had a virtual visit earlier in the day because of suspected sinusitis.  She went to  her Zithromax from the pharmacy, took 2 pills with milk, and within about 5 or 10 minutes, had sudden onset of severe pain.  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  She ended up going through a laparotomy and subsequent discovery of a serosal tear from the ileum.  Patient ended up with sepsis during that hospitalization.    Patient had been feeling fine earlier in the day.  Vital signs reveal a temp of 98 degrees, blood pressure 140/78, heart rate of 102, respiratory rate of  20 with 100% oxygen saturation.  Subsequent blood pressure was 125/86.  Patient was seen, and she had epigastric tenderness.  This seemed to occur immediately after she took her Zithromax.  She has taken this in the past.  She did not have any distention, rebound, guarding or tympanitic paige exam.  We proceeded with further workup including blood work, urinalysis and CT scan.  CBC reveals a normal white blood count of 7.6 with normal differential.  Comprehensive metabolic panel is normal except for glucose of 200.  She is a known diabetic with an insulin pump.  Troponin is 7.  D-dimer 0.35.  Lipase level is 42.  Lactic acid levels 1.0.  Urinalysis was normal.    8:31 PM: Patient's blood work has returned.  Everything looks very reassuring including lactic acid level, lipase, CBC, comprehensive metabolic panel, troponin and D-dimer.  Urinalysis also was unremarkable.  Given that this is bringing back the patient's PTSD regarding her perforated bowel, and pain still rated 7 out of 10.  We will proceed with CT scan of the abdomen to reassure her that she does not have any developing recurrence of bowel perforation.    9:10 PM: CT scan of the abdomen reveals no acute abnormality.  I reviewed these results with the patient and reassured her.  This is most likely related to her Zithromax intake.  She may have developed acute gastritis/esophagitis.  I offered a GI cocktail but she did not want any medication that would numb up her mouth or throat.  She will take an extra famotidine tonight.  Double up on the famotidine for the next week.  Make sure she takes Zithromax on a full stomach.  Return to the ED if symptoms worsen.  Patient is stable for discharge home.        Written after-visit summary and instructions were given at the time of discharge.    Follow up Plan:   [facility] Emergency Dept  [address] [place] [id number] [phone]          Discharge Instructions:   We did not  find a worrisome cause for your upper abdominal pain.  The CT scan and blood work were all reassuring.  Your pain may be due to acute gastritis/esophagitis from the Zithromax.  Double up on your famotidine for the next several days.  Make sure you are eating food before you take your next dose of Zithromax.  Return to the emergency department at any time if your symptoms worsen.       Disclaimer: This note consists of words and symbols derived from keyboarding and dictation using voice recognition software.  As a result, there may be errors that have gone undetected.  Please consider this when interpreting information found in this note.       Yoanna Stephenson MD  03/20/24 1704       Yoanna Stephenson MD  03/20/24 0929

## 2024-03-21 NOTE — DISCHARGE INSTRUCTIONS
We did not find a worrisome cause for your upper abdominal pain.  The CT scan and blood work were all reassuring.  Your pain may be due to acute gastritis/esophagitis from the Zithromax.  Double up on your famotidine for the next several days.  Make sure you are eating food before you take your next dose of Zithromax.  Return to the emergency department at any time if your symptoms worsen.

## 2024-06-05 ENCOUNTER — HOSPITAL ENCOUNTER (EMERGENCY)
Facility: CLINIC | Age: 39
Discharge: HOME | End: 2024-06-05
Attending: PHYSICIAN ASSISTANT | Admitting: PHYSICIAN ASSISTANT
Payer: COMMERCIAL

## 2024-06-05 VITALS
TEMPERATURE: 98.1 F | OXYGEN SATURATION: 100 % | HEART RATE: 94 BPM | WEIGHT: 135 LBS | RESPIRATION RATE: 23 BRPM | DIASTOLIC BLOOD PRESSURE: 98 MMHG | BODY MASS INDEX: 21.79 KG/M2 | SYSTOLIC BLOOD PRESSURE: 142 MMHG

## 2024-06-05 DIAGNOSIS — R73.9 HYPERGLYCEMIA: ICD-10-CM

## 2024-06-05 LAB
ALBUMIN SERPL BCG-MCNC: 4.1 G/DL (ref 3.5–5.2)
ALBUMIN UR-MCNC: NEGATIVE MG/DL
ALP SERPL-CCNC: 101 U/L (ref 40–150)
ALT SERPL W P-5'-P-CCNC: 23 U/L (ref 0–50)
ANION GAP SERPL CALCULATED.3IONS-SCNC: 13 MMOL/L (ref 7–15)
APPEARANCE UR: CLEAR
AST SERPL W P-5'-P-CCNC: 20 U/L (ref 0–45)
B-OH-BUTYR SERPL-SCNC: 0.5 MMOL/L
BASE EXCESS BLDV CALC-SCNC: -2 MMOL/L (ref -3–3)
BASOPHILS # BLD AUTO: 0.1 10E3/UL (ref 0–0.2)
BASOPHILS NFR BLD AUTO: 0 %
BILIRUB SERPL-MCNC: 0.2 MG/DL
BILIRUB UR QL STRIP: NEGATIVE
BUN SERPL-MCNC: 19.2 MG/DL (ref 6–20)
CALCIUM SERPL-MCNC: 9.1 MG/DL (ref 8.6–10)
CHLORIDE SERPL-SCNC: 100 MMOL/L (ref 98–107)
COLOR UR AUTO: YELLOW
CREAT SERPL-MCNC: 0.68 MG/DL (ref 0.51–0.95)
DEPRECATED HCO3 PLAS-SCNC: 23 MMOL/L (ref 22–29)
EGFRCR SERPLBLD CKD-EPI 2021: >90 ML/MIN/1.73M2
EOSINOPHIL # BLD AUTO: 0.1 10E3/UL (ref 0–0.7)
EOSINOPHIL NFR BLD AUTO: 1 %
ERYTHROCYTE [DISTWIDTH] IN BLOOD BY AUTOMATED COUNT: 13 % (ref 10–15)
GLUCOSE BLDC GLUCOMTR-MCNC: 259 MG/DL (ref 70–99)
GLUCOSE BLDC GLUCOMTR-MCNC: 564 MG/DL (ref 70–99)
GLUCOSE SERPL-MCNC: 626 MG/DL (ref 70–99)
GLUCOSE UR STRIP-MCNC: >499 MG/DL
HCG UR QL: NEGATIVE
HCO3 BLDV-SCNC: 24 MMOL/L (ref 21–28)
HCT VFR BLD AUTO: 39 % (ref 35–47)
HGB BLD-MCNC: 13.3 G/DL (ref 11.7–15.7)
HGB UR QL STRIP: NEGATIVE
IMM GRANULOCYTES # BLD: 0.1 10E3/UL
IMM GRANULOCYTES NFR BLD: 0 %
KETONES UR STRIP-MCNC: 5 MG/DL
LACTATE SERPL-SCNC: 1.8 MMOL/L (ref 0.7–2)
LEUKOCYTE ESTERASE UR QL STRIP: NEGATIVE
LYMPHOCYTES # BLD AUTO: 1.9 10E3/UL (ref 0.8–5.3)
LYMPHOCYTES NFR BLD AUTO: 15 %
MAGNESIUM SERPL-MCNC: 1.5 MG/DL (ref 1.7–2.3)
MCH RBC QN AUTO: 32 PG (ref 26.5–33)
MCHC RBC AUTO-ENTMCNC: 34.1 G/DL (ref 31.5–36.5)
MCV RBC AUTO: 94 FL (ref 78–100)
MONOCYTES # BLD AUTO: 0.7 10E3/UL (ref 0–1.3)
MONOCYTES NFR BLD AUTO: 5 %
NEUTROPHILS # BLD AUTO: 9.6 10E3/UL (ref 1.6–8.3)
NEUTROPHILS NFR BLD AUTO: 78 %
NITRATE UR QL: NEGATIVE
NRBC # BLD AUTO: 0 10E3/UL
NRBC BLD AUTO-RTO: 0 /100
O2/TOTAL GAS SETTING VFR VENT: 0 %
OXYHGB MFR BLDV: 72 % (ref 70–75)
PCO2 BLDV: 43 MM HG (ref 40–50)
PH BLDV: 7.35 [PH] (ref 7.32–7.43)
PH UR STRIP: 5 [PH] (ref 5–7)
PLATELET # BLD AUTO: 433 10E3/UL (ref 150–450)
PO2 BLDV: 40 MM HG (ref 25–47)
POTASSIUM SERPL-SCNC: 4.8 MMOL/L (ref 3.4–5.3)
PROT SERPL-MCNC: 7.1 G/DL (ref 6.4–8.3)
RBC # BLD AUTO: 4.16 10E6/UL (ref 3.8–5.2)
RBC URINE: 0 /HPF
SAO2 % BLDV: 76.5 % (ref 70–75)
SODIUM SERPL-SCNC: 136 MMOL/L (ref 135–145)
SP GR UR STRIP: 1.02 (ref 1–1.03)
SQUAMOUS EPITHELIAL: 1 /HPF
UROBILINOGEN UR STRIP-MCNC: NORMAL MG/DL
WBC # BLD AUTO: 12.4 10E3/UL (ref 4–11)
WBC URINE: 0 /HPF

## 2024-06-05 PROCEDURE — 96375 TX/PRO/DX INJ NEW DRUG ADDON: CPT | Performed by: PHYSICIAN ASSISTANT

## 2024-06-05 PROCEDURE — 82805 BLOOD GASES W/O2 SATURATION: CPT | Performed by: PHYSICIAN ASSISTANT

## 2024-06-05 PROCEDURE — 250N000011 HC RX IP 250 OP 636: Performed by: PHYSICIAN ASSISTANT

## 2024-06-05 PROCEDURE — 83735 ASSAY OF MAGNESIUM: CPT | Performed by: PHYSICIAN ASSISTANT

## 2024-06-05 PROCEDURE — 80053 COMPREHEN METABOLIC PANEL: CPT | Performed by: PHYSICIAN ASSISTANT

## 2024-06-05 PROCEDURE — 82962 GLUCOSE BLOOD TEST: CPT

## 2024-06-05 PROCEDURE — 81001 URINALYSIS AUTO W/SCOPE: CPT | Performed by: PHYSICIAN ASSISTANT

## 2024-06-05 PROCEDURE — 258N000003 HC RX IP 258 OP 636: Performed by: PHYSICIAN ASSISTANT

## 2024-06-05 PROCEDURE — 83605 ASSAY OF LACTIC ACID: CPT | Performed by: PHYSICIAN ASSISTANT

## 2024-06-05 PROCEDURE — 99284 EMERGENCY DEPT VISIT MOD MDM: CPT | Mod: 25 | Performed by: PHYSICIAN ASSISTANT

## 2024-06-05 PROCEDURE — 81025 URINE PREGNANCY TEST: CPT | Performed by: PHYSICIAN ASSISTANT

## 2024-06-05 PROCEDURE — 36415 COLL VENOUS BLD VENIPUNCTURE: CPT | Performed by: PHYSICIAN ASSISTANT

## 2024-06-05 PROCEDURE — 82010 KETONE BODYS QUAN: CPT | Performed by: PHYSICIAN ASSISTANT

## 2024-06-05 PROCEDURE — 96374 THER/PROPH/DIAG INJ IV PUSH: CPT | Performed by: PHYSICIAN ASSISTANT

## 2024-06-05 PROCEDURE — 96361 HYDRATE IV INFUSION ADD-ON: CPT | Performed by: PHYSICIAN ASSISTANT

## 2024-06-05 PROCEDURE — 85041 AUTOMATED RBC COUNT: CPT | Performed by: PHYSICIAN ASSISTANT

## 2024-06-05 PROCEDURE — 99284 EMERGENCY DEPT VISIT MOD MDM: CPT | Performed by: PHYSICIAN ASSISTANT

## 2024-06-05 RX ORDER — UBROGEPANT 100 MG/1
100 TABLET ORAL
COMMUNITY
Start: 2023-11-25

## 2024-06-05 RX ORDER — FLUTICASONE PROPIONATE 50 MCG
1 SPRAY, SUSPENSION (ML) NASAL 2 TIMES DAILY PRN
COMMUNITY

## 2024-06-05 RX ORDER — INSULIN ASPART 100 [IU]/ML
12-20 INJECTION, SOLUTION INTRAVENOUS; SUBCUTANEOUS 3 TIMES DAILY PRN
COMMUNITY
Start: 2024-03-25

## 2024-06-05 RX ORDER — CLONAZEPAM 0.5 MG/1
0.5 TABLET ORAL 2 TIMES DAILY PRN
COMMUNITY
Start: 2024-05-01

## 2024-06-05 RX ORDER — DIPHENHYDRAMINE HYDROCHLORIDE 50 MG/ML
25 INJECTION INTRAMUSCULAR; INTRAVENOUS ONCE
Status: COMPLETED | OUTPATIENT
Start: 2024-06-05 | End: 2024-06-05

## 2024-06-05 RX ORDER — KETOROLAC TROMETHAMINE 15 MG/ML
15 INJECTION, SOLUTION INTRAMUSCULAR; INTRAVENOUS ONCE
Status: COMPLETED | OUTPATIENT
Start: 2024-06-05 | End: 2024-06-05

## 2024-06-05 RX ORDER — DEXTROAMPHETAMINE SACCHARATE, AMPHETAMINE ASPARTATE, DEXTROAMPHETAMINE SULFATE AND AMPHETAMINE SULFATE 5; 5; 5; 5 MG/1; MG/1; MG/1; MG/1
20 TABLET ORAL 2 TIMES DAILY
COMMUNITY
Start: 2024-05-15

## 2024-06-05 RX ORDER — QUETIAPINE FUMARATE 50 MG/1
50 TABLET, FILM COATED ORAL AT BEDTIME
COMMUNITY
Start: 2024-04-19

## 2024-06-05 RX ORDER — METOCLOPRAMIDE HYDROCHLORIDE 5 MG/ML
5 INJECTION INTRAMUSCULAR; INTRAVENOUS ONCE
Status: COMPLETED | OUTPATIENT
Start: 2024-06-05 | End: 2024-06-05

## 2024-06-05 RX ORDER — MAGNESIUM SULFATE HEPTAHYDRATE 40 MG/ML
2 INJECTION, SOLUTION INTRAVENOUS ONCE
Status: COMPLETED | OUTPATIENT
Start: 2024-06-05 | End: 2024-06-05

## 2024-06-05 RX ADMIN — SODIUM CHLORIDE 2000 ML: 9 INJECTION, SOLUTION INTRAVENOUS at 13:48

## 2024-06-05 RX ADMIN — KETOROLAC TROMETHAMINE 15 MG: 15 INJECTION, SOLUTION INTRAMUSCULAR; INTRAVENOUS at 14:14

## 2024-06-05 RX ADMIN — METOCLOPRAMIDE 5 MG: 5 INJECTION, SOLUTION INTRAMUSCULAR; INTRAVENOUS at 14:17

## 2024-06-05 RX ADMIN — MAGNESIUM SULFATE HEPTAHYDRATE 2 G: 40 INJECTION, SOLUTION INTRAVENOUS at 14:25

## 2024-06-05 ASSESSMENT — COLUMBIA-SUICIDE SEVERITY RATING SCALE - C-SSRS
6. HAVE YOU EVER DONE ANYTHING, STARTED TO DO ANYTHING, OR PREPARED TO DO ANYTHING TO END YOUR LIFE?: NO
1. IN THE PAST MONTH, HAVE YOU WISHED YOU WERE DEAD OR WISHED YOU COULD GO TO SLEEP AND NOT WAKE UP?: NO
2. HAVE YOU ACTUALLY HAD ANY THOUGHTS OF KILLING YOURSELF IN THE PAST MONTH?: NO

## 2024-06-05 ASSESSMENT — ACTIVITIES OF DAILY LIVING (ADL)
ADLS_ACUITY_SCORE: 35

## 2024-06-05 NOTE — ED TRIAGE NOTES
Pt here with blood sugars in 600's.  Started today    Had a fall on to tailbone last week         Triage Assessment (Adult)       Row Name 06/05/24 1258          Triage Assessment    Airway WDL WDL        Respiratory WDL    Respiratory WDL WDL

## 2024-06-05 NOTE — ED PROVIDER NOTES
"  History     Chief Complaint   Patient presents with    Hyperglycemia       HPI  Heidi Su is a 39 year old female with a history of type 1 diabetes who presents to the emergency department for high blood sugar readings.  The patient reports for the last week she has been having issues with her pump calibrating correctly which has led to her not always getting insulin like she should.  She has had some difficulties getting into her clinic due to some social/legal concerns with her ex-.  Today she took her pump off to shower but saw that her blood sugar before that was around 200 after she had eaten breakfast.  She put her pump back on but it must not have recalibrated with her phone because while driving to  her kids she started feeling foggy with a headache and just \"off.\"  She manually checked her sugar and it was in the high 500s.  She called her sister who agreed to go get her kids and patient turned around and brought herself to the emergency department.  She has not had any vomiting but has had nausea.  No fevers or cough or cold symptoms.  No urinary symptoms.  She did correct her blood sugar with 9 units insulin prior to arrival.        Allergies:  Allergies   Allergen Reactions    No Known Allergies        Problem List:    Patient Active Problem List    Diagnosis Date Noted    Hypophosphatemia 05/21/2023     Priority: Medium    Hypokalemia 05/20/2023     Priority: Medium    Anemia, unspecified type 05/20/2023     Priority: Medium    Surgical abdomen 05/19/2023     Priority: Medium    Free intraperitoneal air 05/19/2023     Priority: Medium    Type 1 diabetes mellitus with hyperglycemia (H) 05/19/2023     Priority: Medium    Insulin pump status 05/19/2023     Priority: Medium    ADHD (attention deficit hyperactivity disorder) 05/19/2023     Priority: Medium    Depression with anxiety 05/19/2023     Priority: Medium    SIRS (systemic inflammatory response syndrome) (H) 05/19/2023     " Priority: Medium    Diabetic ketoacidosis without coma associated with type 1 diabetes mellitus (H) 05/19/2023     Priority: Medium    Thrombosis of right saphenous vein 05/19/2023     Priority: Medium    CARDIOVASCULAR SCREENING; LDL GOAL LESS THAN 160 04/03/2012     Priority: Medium        Past Medical History:    Past Medical History:   Diagnosis Date    Anxiety     Bronchitis     Depressive disorder     Diabetes (H)     NO ACTIVE PROBLEMS     Ovarian cyst        Past Surgical History:    Past Surgical History:   Procedure Laterality Date    APPENDECTOMY OPEN N/A 5/19/2023    Procedure: Open Appendectomy;  Surgeon: Jesse Blackman MD;  Location: [facility]    CYSTECTOMY OVARIAN BENIGN      DILATION AND CURETTAGE      DILATION AND CURETTAGE  2011    GYN SURGERY      HEMORRHOID SURGERY      LAPAROSCOPY DIAGNOSTIC (GENERAL) N/A 5/19/2023    Procedure: LAPAROSCOPY, DIAGNOSTIC;  Surgeon: Jesse Blackman MD;  Location: [facility]    LAPAROTOMY EXPLORATORY N/A 5/19/2023    Procedure: LAPAROTOMY;  Surgeon: Jesse Blackman MD;  Location: [facility] OR       Family History:    Family History   Problem Relation Age of Onset    Cerebrovascular Disease Maternal Grandfather     Breast Cancer Maternal Grandfather     Asthma Sister     Diabetes Sister 14       Social History:  Marital Status:  Single [1]  Social History     Tobacco Use    Smoking status: Every Day     Current packs/day: 0.25     Types: Cigarettes    Smokeless tobacco: Never   Substance Use Topics    Alcohol use: Yes     Comment: 1-2x/month    Drug use: No        Medications:    acetaminophen (TYLENOL) 500 MG tablet  amphetamine-dextroamphetamine (ADDERALL) 20 MG tablet  blood glucose (CONTOUR NEXT TEST) test strip  clonazePAM (KLONOPIN) 0.5 MG tablet  Continuous Blood Gluc Sensor (DEXCOM G6 SENSOR) MISC  Continuous Blood Gluc Transmit (DEXCOM G6 TRANSMITTER) MISC  famotidine (PEPCID) 20 MG tablet  fluticasone (FLONASE) 50 MCG/ACT nasal spray  insulin aspart (NOVOLOG VIAL)  100 UNITS/ML vial  Insulin Glargine-yfgn 100 UNIT/ML SOPN  INSULIN PUMP - OUTPATIENT  KETOSTIX test strip  NOVOLOG FLEXPEN 100 UNIT/ML soln  QUEtiapine (SEROQUEL) 50 MG tablet  UBRELVY 100 MG tablet          Review of Systems   All other systems reviewed and are negative.          Physical Exam   BP: (!) 151/100  Pulse: 107  Temp: 98.1  F (36.7  C)  Resp: 18  Weight: 61.2 kg (135 lb)  SpO2: 98 %      Physical Exam  Vitals and nursing note reviewed.   Constitutional:       General: She is not in acute distress.     Appearance: Normal appearance. She is well-developed. She is not ill-appearing, toxic-appearing or diaphoretic.   HENT:      Head: Normocephalic and atraumatic.      Nose: Nose normal.      Mouth/Throat:      Mouth: Mucous membranes are moist.      Pharynx: Oropharynx is clear.   Eyes:      Conjunctiva/sclera: Conjunctivae normal.      Pupils: Pupils are equal, round, and reactive to light.   Cardiovascular:      Rate and Rhythm: Normal rate and regular rhythm.      Heart sounds: Normal heart sounds.   Pulmonary:      Effort: Pulmonary effort is normal. No respiratory distress.      Breath sounds: Normal breath sounds.   Abdominal:      General: Bowel sounds are normal. There is no distension.      Palpations: Abdomen is soft.      Tenderness: There is no abdominal tenderness.   Musculoskeletal:         General: No deformity.      Cervical back: Neck supple.   Skin:     General: Skin is warm and dry.   Neurological:      General: No focal deficit present.      Mental Status: She is alert and oriented to person, place, and time. Mental status is at baseline.      Coordination: Coordination normal.   Psychiatric:         Mood and Affect: Mood is anxious.             ED Course        Procedures      Results for orders placed or performed during the hospital encounter of 06/05/24 (from the past 24 hour(s))   Glucose by meter   Result Value Ref Range    GLUCOSE BY METER POCT 564 (HH) 70 - 99 mg/dL   CBC with  platelets differential    Narrative    The following orders were created for panel order CBC with platelets differential.  Procedure                               Abnormality         Status                     ---------                               -----------         ------                     CBC with platelets and d...[[id number]]  Abnormal            Final result                 Please view results for these tests on the individual orders.   Comprehensive metabolic panel   Result Value Ref Range    Sodium 136 135 - 145 mmol/L    Potassium 4.8 3.4 - 5.3 mmol/L    Carbon Dioxide (CO2) 23 22 - 29 mmol/L    Anion Gap 13 7 - 15 mmol/L    Urea Nitrogen 19.2 6.0 - 20.0 mg/dL    Creatinine 0.68 0.51 - 0.95 mg/dL    GFR Estimate >90 >60 mL/min/1.73m2    Calcium 9.1 8.6 - 10.0 mg/dL    Chloride 100 98 - 107 mmol/L    Glucose 626 (HH) 70 - 99 mg/dL    Alkaline Phosphatase 101 40 - 150 U/L    AST 20 0 - 45 U/L    ALT 23 0 - 50 U/L    Protein Total 7.1 6.4 - 8.3 g/dL    Albumin 4.1 3.5 - 5.2 g/dL    Bilirubin Total 0.2 <=1.2 mg/dL   Blood gas venous   Result Value Ref Range    pH Venous 7.35 7.32 - 7.43    pCO2 Venous 43 40 - 50 mm Hg    pO2 Venous 40 25 - 47 mm Hg    Bicarbonate Venous 24 21 - 28 mmol/L    Base Excess/Deficit Venous -2.0 -3.0 - 3.0 mmol/L    FIO2 0     Oxyhemoglobin Venous 72 70 - 75 %    O2 Sat, Venous 76.5 (H) 70.0 - 75.0 %    Narrative    In healthy individuals, oxyhemoglobin (O2Hb) and oxygen saturation (SO2) are approximately equal. In the presence of dyshemoglobins, oxyhemoglobin can be considerably lower than oxygen saturation.   Lactic acid whole blood   Result Value Ref Range    Lactic Acid 1.8 0.7 - 2.0 mmol/L   Ketone Beta-Hydroxybutyrate Quantitative   Result Value Ref Range    Ketone (Beta-Hydroxybutyrate) Quantitative 0.50 (H) <=0.30 mmol/L   Magnesium   Result Value Ref Range    Magnesium 1.5 (L) 1.7 - 2.3 mg/dL   UA with Microscopic reflex to Culture    Specimen: Urine, Midstream   Result  Value Ref Range    Color Urine Yellow Colorless, Straw, Light Yellow, Yellow    Appearance Urine Clear Clear    Glucose Urine >499 (A) Negative mg/dL    Bilirubin Urine Negative Negative    Ketones Urine 5 (A) Negative mg/dL    Specific Gravity Urine 1.024 1.003 - 1.035    Blood Urine Negative Negative    pH Urine 5.0 5.0 - 7.0    Protein Albumin Urine Negative Negative mg/dL    Urobilinogen Urine Normal Normal, 2.0 mg/dL    Nitrite Urine Negative Negative    Leukocyte Esterase Urine Negative Negative    RBC Urine 0 <=2 /HPF    WBC Urine 0 <=5 /HPF    Squamous Epithelials Urine 1 <=1 /HPF    Narrative    Urine Culture not indicated   HCG qualitative urine (UPT)   Result Value Ref Range    hCG Urine Qualitative Negative Negative   CBC with platelets and differential   Result Value Ref Range    WBC Count 12.4 (H) 4.0 - 11.0 10e3/uL    RBC Count 4.16 3.80 - 5.20 10e6/uL    Hemoglobin 13.3 11.7 - 15.7 g/dL    Hematocrit 39.0 35.0 - 47.0 %    MCV 94 78 - 100 fL    MCH 32.0 26.5 - 33.0 pg    MCHC 34.1 31.5 - 36.5 g/dL    RDW 13.0 10.0 - 15.0 %    Platelet Count 433 150 - 450 10e3/uL    % Neutrophils 78 %    % Lymphocytes 15 %    % Monocytes 5 %    % Eosinophils 1 %    % Basophils 0 %    % Immature Granulocytes 0 %    NRBCs per 100 WBC 0 <1 /100    Absolute Neutrophils 9.6 (H) 1.6 - 8.3 10e3/uL    Absolute Lymphocytes 1.9 0.8 - 5.3 10e3/uL    Absolute Monocytes 0.7 0.0 - 1.3 10e3/uL    Absolute Eosinophils 0.1 0.0 - 0.7 10e3/uL    Absolute Basophils 0.1 0.0 - 0.2 10e3/uL    Absolute Immature Granulocytes 0.1 <=0.4 10e3/uL    Absolute NRBCs 0.0 10e3/uL   Glucose by meter   Result Value Ref Range    GLUCOSE BY METER POCT 259 (H) 70 - 99 mg/dL       Medications   sodium chloride 0.9% BOLUS 2,000 mL (0 mLs Intravenous Stopped 6/5/24 1549)   magnesium sulfate 2 g in 50 mL sterile water intermittent infusion (0 g Intravenous Stopped 6/5/24 1526)   ketorolac (TORADOL) injection 15 mg (15 mg Intravenous $Given 6/5/24 7324)    metoclopramide (REGLAN) injection 5 mg (5 mg Intravenous $Given 6/5/24 1417)   diphenhydrAMINE (BENADRYL) injection 25 mg (25 mg Intravenous Not Given 6/5/24 1420)         Assessments & Plan (with Medical Decision Making)  Heidi Su is a 39 year old female who presents to the ED for concerns of high blood sugars.  Patient is a type I diabetic and reports her pump has been acting up for the last week and thinks it malfunction today.  She checked her blood sugar earlier this morning and it was around 200.  She started getting a headache and feeling fuzzy and rechecked it and it was in the high 500s so she came here.  On arrival to the ED she was hypertensive but otherwise had normal vital signs.  She was anxious but did not appear acutely ill or toxic.  Exam benign.  Point-of-care glucose reading 564.  IV access obtained and patient administered 2 L IV fluids.  Also given Toradol, Reglan, Benadryl, magnesium, and Zofran for her headache and nausea.  Labs today reviewed, white count mildly high at 12.4.  Venous gas without signs of acidosis.  Lactic acid normal.  Ketones minimally elevated at 0.50.  Blood glucose recheck 626.  Magnesium mildly low at 1.5 but she did receive magnesium for headache so adequately replaced.  No other significant electrolyte abnormalities.  Patient's pump was functioning properly throughout course of ED stay and was correcting her blood glucose over the course of 3 hours.  After 2 L of IV fluids, her blood sugar was down to 259 and she reported feeling drastically improved.  I do not think she will require hospital admission since she is tolerating p.o., sugars are downtrending, and pump is working at this time.  She is comfortable with plan to discharge home.  I advise she try to get into endocrinology as soon as possible to discuss her pump but in the meantime to monitor blood sugars closely manually and correct sugars as indicated.  She was provided instructions to return to the  ED for any worsening symptoms.  All questions answered and patient discharged home in stable condition.     I have reviewed the nursing notes.    I have reviewed the findings, diagnosis, plan and need for follow up with the patient.      Discharge Medication List as of 6/5/2024  3:59 PM          Final diagnoses:   Hyperglycemia     Note: Chart documentation done in part with Dragon Voice Recognition software. Although reviewed after completion, some word and grammatical errors may remain.     6/5/2024   [facility] EMERGENCY DEPT       Cris Dean PA-C  06/05/24 3087

## 2024-06-05 NOTE — DISCHARGE INSTRUCTIONS
I am glad you are feeling better.  Continue to watch her sugars closely and follow-up with endocrinology when you can.  If you do have any worsening symptoms please return to the emergency department.    Thank you for choosing [facility] [place]. It was a pleasure taking care of you today. If you have any questions, please call [phone].    Cris Dean PA-C

## 2024-06-05 NOTE — MEDICATION SCRIBE - ADMISSION MEDICATION HISTORY
Medication Scribe Admission Medication History    Admission medication history is complete. The information provided in this note is only as accurate as the sources available at the time of the update.    Information Source(s): Patient and CareEverywhere/SureScripts via in-person    Pertinent Information: n/a    Changes made to PTA medication list:  Added: klonopin, flonase, ubrelvy, insulin pump, novolog flexpen  Deleted: None  Changed: adderall from 15mg to 20mg, pepcid to BID prn, seroquel from 25mg to 50mg    Allergies reviewed with patient and updates made in EHR: yes    Medication History Completed By: MERVIN JIMENEZ 6/5/2024 2:10 PM    PTA Med List   Medication Sig Note Last Dose    acetaminophen (TYLENOL) 500 MG tablet Take 500 mg by mouth every 4 hours as needed for mild pain  6/5/2024 at 1130    amphetamine-dextroamphetamine (ADDERALL) 20 MG tablet Take 20 mg by mouth 2 times daily 6/5/2024: [facility]  Last Dispensed: 05/15/24  Quantity/Days supply: 60/30  Ordering Provider: Zachary  Pharmacy: [facility]   6/5/2024 at 1200 2nd    blood glucose (CONTOUR NEXT TEST) test strip TEST 5- 6 TIMES/DAY, uncontrolled diabetes   at backup/calibration    clonazePAM (KLONOPIN) 0.5 MG tablet Take 0.5 mg by mouth 2 times daily as needed for anxiety 6/5/2024: From [facility] Dispense Report  Last Dispensed: 05/01/2024  Quantity/Days supply: 30/15  Ordering Provider: Debra Larson MD  Pharmacy: [facility]   Past Week at hs    Continuous Blood Gluc Sensor (DEXCOM G6 SENSOR) MISC USE AS DIRECTED TO CHECK BLOOD SUGARS. CHANGE EVERY 10 DAYS  5/29/2024 at am    Continuous Blood Gluc Transmit (DEXCOM G6 TRANSMITTER) MISC    at current    famotidine (PEPCID) 20 MG tablet Take 20 mg by mouth 2 times daily as needed (heartburn)  6/4/2024 at hs    fluticasone (FLONASE) 50 MCG/ACT nasal spray Spray 1 spray into both nostrils 2 times daily as needed for rhinitis  More than a month at unkn     insulin aspart (NOVOLOG VIAL) 100 UNITS/ML vial Inject Subcutaneous daily Maximum 60 units daily in pump  6/4/2024 at am filled    Insulin Glargine-yfgn 100 UNIT/ML SOPN inject 24 units subq every 24 hours in case of pump failure  More than a month at unkn    INSULIN PUMP - OUTPATIENT Date Last Updated: 06/05/2024  Tandem  BASAL RATES and times:  daily: 1 units/hour    Target 110  6/4/2024 at am    KETOSTIX test strip FOR PERSONAL USE IF DKA IS SUSPECTED.  Unknown at unkn    NOVOLOG FLEXPEN 100 UNIT/ML soln Inject 12-20 Units Subcutaneous 3 times daily as needed (cartridge supply failure)  Past Month at unkn    QUEtiapine (SEROQUEL) 50 MG tablet Take 50 mg by mouth at bedtime  6/4/2024 at hs    UBRELVY 100 MG tablet Take 100 mg by mouth at onset of headache (migraine, may repeat one time if needed.)  6/4/2024 at pm

## 2024-06-10 ENCOUNTER — APPOINTMENT (OUTPATIENT)
Dept: CT IMAGING | Facility: CLINIC | Age: 39
End: 2024-06-10
Attending: FAMILY MEDICINE
Payer: COMMERCIAL

## 2024-06-10 ENCOUNTER — HOSPITAL ENCOUNTER (EMERGENCY)
Facility: CLINIC | Age: 39
Discharge: HOME | End: 2024-06-10
Attending: FAMILY MEDICINE | Admitting: FAMILY MEDICINE
Payer: COMMERCIAL

## 2024-06-10 VITALS
DIASTOLIC BLOOD PRESSURE: 109 MMHG | SYSTOLIC BLOOD PRESSURE: 145 MMHG | OXYGEN SATURATION: 96 % | HEART RATE: 122 BPM | RESPIRATION RATE: 18 BRPM | TEMPERATURE: 97.9 F

## 2024-06-10 DIAGNOSIS — R07.89 CHEST WALL PAIN: ICD-10-CM

## 2024-06-10 DIAGNOSIS — F41.9 ANXIETY: ICD-10-CM

## 2024-06-10 LAB
ALBUMIN SERPL BCG-MCNC: 3.9 G/DL (ref 3.5–5.2)
ALP SERPL-CCNC: 99 U/L (ref 40–150)
ALT SERPL W P-5'-P-CCNC: 15 U/L (ref 0–50)
ANION GAP SERPL CALCULATED.3IONS-SCNC: 14 MMOL/L (ref 7–15)
AST SERPL W P-5'-P-CCNC: 17 U/L (ref 0–45)
BASOPHILS # BLD AUTO: 0.1 10E3/UL (ref 0–0.2)
BASOPHILS NFR BLD AUTO: 1 %
BILIRUB DIRECT SERPL-MCNC: <0.2 MG/DL (ref 0–0.3)
BILIRUB SERPL-MCNC: <0.2 MG/DL
BUN SERPL-MCNC: 13.2 MG/DL (ref 6–20)
CALCIUM SERPL-MCNC: 8.6 MG/DL (ref 8.6–10)
CHLORIDE SERPL-SCNC: 105 MMOL/L (ref 98–107)
CREAT SERPL-MCNC: 0.59 MG/DL (ref 0.51–0.95)
DEPRECATED HCO3 PLAS-SCNC: 20 MMOL/L (ref 22–29)
EGFRCR SERPLBLD CKD-EPI 2021: >90 ML/MIN/1.73M2
EOSINOPHIL # BLD AUTO: 0.1 10E3/UL (ref 0–0.7)
EOSINOPHIL NFR BLD AUTO: 2 %
ERYTHROCYTE [DISTWIDTH] IN BLOOD BY AUTOMATED COUNT: 12.9 % (ref 10–15)
GLUCOSE SERPL-MCNC: 257 MG/DL (ref 70–99)
HCT VFR BLD AUTO: 36 % (ref 35–47)
HGB BLD-MCNC: 12.6 G/DL (ref 11.7–15.7)
IMM GRANULOCYTES # BLD: 0 10E3/UL
IMM GRANULOCYTES NFR BLD: 0 %
LIPASE SERPL-CCNC: 47 U/L (ref 13–60)
LYMPHOCYTES # BLD AUTO: 2.9 10E3/UL (ref 0.8–5.3)
LYMPHOCYTES NFR BLD AUTO: 43 %
MCH RBC QN AUTO: 31.9 PG (ref 26.5–33)
MCHC RBC AUTO-ENTMCNC: 35 G/DL (ref 31.5–36.5)
MCV RBC AUTO: 91 FL (ref 78–100)
MONOCYTES # BLD AUTO: 0.4 10E3/UL (ref 0–1.3)
MONOCYTES NFR BLD AUTO: 6 %
NEUTROPHILS # BLD AUTO: 3.3 10E3/UL (ref 1.6–8.3)
NEUTROPHILS NFR BLD AUTO: 48 %
NRBC # BLD AUTO: 0 10E3/UL
NRBC BLD AUTO-RTO: 0 /100
PLATELET # BLD AUTO: 383 10E3/UL (ref 150–450)
POTASSIUM SERPL-SCNC: 3.9 MMOL/L (ref 3.4–5.3)
PROT SERPL-MCNC: 7 G/DL (ref 6.4–8.3)
RBC # BLD AUTO: 3.95 10E6/UL (ref 3.8–5.2)
SODIUM SERPL-SCNC: 139 MMOL/L (ref 135–145)
TROPONIN T SERPL HS-MCNC: 9 NG/L
WBC # BLD AUTO: 6.9 10E3/UL (ref 4–11)

## 2024-06-10 PROCEDURE — 36415 COLL VENOUS BLD VENIPUNCTURE: CPT | Performed by: FAMILY MEDICINE

## 2024-06-10 PROCEDURE — 71275 CT ANGIOGRAPHY CHEST: CPT

## 2024-06-10 PROCEDURE — 250N000009 HC RX 250: Performed by: FAMILY MEDICINE

## 2024-06-10 PROCEDURE — 80076 HEPATIC FUNCTION PANEL: CPT | Performed by: FAMILY MEDICINE

## 2024-06-10 PROCEDURE — 250N000011 HC RX IP 250 OP 636: Mod: JZ | Performed by: FAMILY MEDICINE

## 2024-06-10 PROCEDURE — 96374 THER/PROPH/DIAG INJ IV PUSH: CPT | Mod: 59 | Performed by: FAMILY MEDICINE

## 2024-06-10 PROCEDURE — 250N000011 HC RX IP 250 OP 636: Performed by: FAMILY MEDICINE

## 2024-06-10 PROCEDURE — 99285 EMERGENCY DEPT VISIT HI MDM: CPT | Mod: 25 | Performed by: FAMILY MEDICINE

## 2024-06-10 PROCEDURE — 85041 AUTOMATED RBC COUNT: CPT | Performed by: FAMILY MEDICINE

## 2024-06-10 PROCEDURE — 83690 ASSAY OF LIPASE: CPT | Performed by: FAMILY MEDICINE

## 2024-06-10 PROCEDURE — 96375 TX/PRO/DX INJ NEW DRUG ADDON: CPT | Mod: 59 | Performed by: FAMILY MEDICINE

## 2024-06-10 PROCEDURE — 99284 EMERGENCY DEPT VISIT MOD MDM: CPT | Performed by: FAMILY MEDICINE

## 2024-06-10 PROCEDURE — 258N000003 HC RX IP 258 OP 636: Mod: JZ | Performed by: FAMILY MEDICINE

## 2024-06-10 PROCEDURE — 96361 HYDRATE IV INFUSION ADD-ON: CPT | Performed by: FAMILY MEDICINE

## 2024-06-10 PROCEDURE — 84484 ASSAY OF TROPONIN QUANT: CPT | Performed by: FAMILY MEDICINE

## 2024-06-10 PROCEDURE — 82374 ASSAY BLOOD CARBON DIOXIDE: CPT | Performed by: FAMILY MEDICINE

## 2024-06-10 RX ORDER — LORAZEPAM 2 MG/ML
1 INJECTION INTRAMUSCULAR ONCE
Status: COMPLETED | OUTPATIENT
Start: 2024-06-10 | End: 2024-06-10

## 2024-06-10 RX ORDER — KETOROLAC TROMETHAMINE 30 MG/ML
30 INJECTION, SOLUTION INTRAMUSCULAR; INTRAVENOUS ONCE
Status: COMPLETED | OUTPATIENT
Start: 2024-06-10 | End: 2024-06-10

## 2024-06-10 RX ORDER — ONDANSETRON 2 MG/ML
4 INJECTION INTRAMUSCULAR; INTRAVENOUS EVERY 30 MIN PRN
Status: DISCONTINUED | OUTPATIENT
Start: 2024-06-10 | End: 2024-06-11 | Stop reason: HOSPADM

## 2024-06-10 RX ORDER — IOPAMIDOL 755 MG/ML
500 INJECTION, SOLUTION INTRAVASCULAR ONCE
Status: COMPLETED | OUTPATIENT
Start: 2024-06-10 | End: 2024-06-10

## 2024-06-10 RX ADMIN — KETOROLAC TROMETHAMINE 30 MG: 30 INJECTION, SOLUTION INTRAMUSCULAR at 21:25

## 2024-06-10 RX ADMIN — SODIUM CHLORIDE 1000 ML: 9 INJECTION, SOLUTION INTRAVENOUS at 21:22

## 2024-06-10 RX ADMIN — ONDANSETRON 4 MG: 2 INJECTION INTRAMUSCULAR; INTRAVENOUS at 21:27

## 2024-06-10 RX ADMIN — SODIUM CHLORIDE 70 ML: 9 INJECTION, SOLUTION INTRAVENOUS at 21:41

## 2024-06-10 RX ADMIN — IOPAMIDOL 58 ML: 755 INJECTION, SOLUTION INTRAVENOUS at 21:41

## 2024-06-10 RX ADMIN — LORAZEPAM 1 MG: 2 INJECTION INTRAMUSCULAR; INTRAVENOUS at 21:24

## 2024-06-10 ASSESSMENT — COLUMBIA-SUICIDE SEVERITY RATING SCALE - C-SSRS
1. IN THE PAST MONTH, HAVE YOU WISHED YOU WERE DEAD OR WISHED YOU COULD GO TO SLEEP AND NOT WAKE UP?: NO
6. HAVE YOU EVER DONE ANYTHING, STARTED TO DO ANYTHING, OR PREPARED TO DO ANYTHING TO END YOUR LIFE?: NO
2. HAVE YOU ACTUALLY HAD ANY THOUGHTS OF KILLING YOURSELF IN THE PAST MONTH?: NO

## 2024-06-10 ASSESSMENT — ACTIVITIES OF DAILY LIVING (ADL)
ADLS_ACUITY_SCORE: 35
ADLS_ACUITY_SCORE: 35

## 2024-06-11 NOTE — ED TRIAGE NOTES
Pt reports chest tightness, L arm numbness and tingling, SOB, N/V. Pt was seen in ED last week for symptoms, reports they have not gotten better.      Triage Assessment (Adult)       Row Name 06/10/24 2039          Triage Assessment    Airway WDL WDL        Respiratory WDL    Respiratory WDL X;cough     Cough Frequency frequent     Cough Type dry        Skin Circulation/Temperature WDL    Skin Circulation/Temperature WDL WDL        Cardiac WDL    Cardiac WDL X;chest pain        Chest Pain Assessment    Character tightness     Duration 1 week        Peripheral/Neurovascular WDL    Peripheral Neurovascular WDL WDL        Cognitive/Neuro/Behavioral WDL    Cognitive/Neuro/Behavioral WDL WDL

## 2024-06-11 NOTE — ED PROVIDER NOTES
History     Chief Complaint   Patient presents with    Chest Pain     HPI  Heidi Su is a 39 year old female who presents with left-sided chest pain that came back again this afternoon.  Patient was seen here about a week ago for the same thing and some blood sugar episodes.  Patient states since then she has been having off-and-on chest pain since then.  Patient came back again this afternoon and has been pretty constant.  She has had some nausea with this.  Taking a deep breath, moving her arm makes the pain worse.  All the pain is over the left side of the chest.  Denies any new back pain.  Patient is feeling a little short of breath with this.  She tried to take some aspirin but that did not help much.  Denies any extremity numbness or tingling.    Allergies:  Allergies   Allergen Reactions    No Known Allergies        Problem List:    Patient Active Problem List    Diagnosis Date Noted    Hypophosphatemia 05/21/2023     Priority: Medium    Hypokalemia 05/20/2023     Priority: Medium    Anemia, unspecified type 05/20/2023     Priority: Medium    Surgical abdomen 05/19/2023     Priority: Medium    Free intraperitoneal air 05/19/2023     Priority: Medium    Type 1 diabetes mellitus with hyperglycemia (H) 05/19/2023     Priority: Medium    Insulin pump status 05/19/2023     Priority: Medium    ADHD (attention deficit hyperactivity disorder) 05/19/2023     Priority: Medium    Depression with anxiety 05/19/2023     Priority: Medium    SIRS (systemic inflammatory response syndrome) (H) 05/19/2023     Priority: Medium    Diabetic ketoacidosis without coma associated with type 1 diabetes mellitus (H) 05/19/2023     Priority: Medium    Thrombosis of right saphenous vein 05/19/2023     Priority: Medium    CARDIOVASCULAR SCREENING; LDL GOAL LESS THAN 160 04/03/2012     Priority: Medium        Past Medical History:    Past Medical History:   Diagnosis Date    Anxiety     Bronchitis     Depressive disorder      Diabetes (H)     NO ACTIVE PROBLEMS     Ovarian cyst        Past Surgical History:    Past Surgical History:   Procedure Laterality Date    APPENDECTOMY OPEN N/A 5/19/2023    Procedure: Open Appendectomy;  Surgeon: Jesse Blackman MD;  Location: [facility]    CYSTECTOMY OVARIAN BENIGN      DILATION AND CURETTAGE      DILATION AND CURETTAGE  2011    GYN SURGERY      HEMORRHOID SURGERY      LAPAROSCOPY DIAGNOSTIC (GENERAL) N/A 5/19/2023    Procedure: LAPAROSCOPY, DIAGNOSTIC;  Surgeon: Jesse Blackman MD;  Location: [facility]    LAPAROTOMY EXPLORATORY N/A 5/19/2023    Procedure: LAPAROTOMY;  Surgeon: Jesse Blackman MD;  Location: [facility]       Family History:    Family History   Problem Relation Age of Onset    Cerebrovascular Disease Maternal Grandfather     Breast Cancer Maternal Grandfather     Asthma Sister     Diabetes Sister 14       Social History:  Marital Status:  Single [1]  Social History     Tobacco Use    Smoking status: Every Day     Current packs/day: 0.25     Types: Cigarettes    Smokeless tobacco: Never   Substance Use Topics    Alcohol use: Yes     Comment: 1-2x/month    Drug use: No        Medications:    acetaminophen (TYLENOL) 500 MG tablet  amphetamine-dextroamphetamine (ADDERALL) 20 MG tablet  blood glucose (CONTOUR NEXT TEST) test strip  clonazePAM (KLONOPIN) 0.5 MG tablet  Continuous Blood Gluc Sensor (DEXCOM G6 SENSOR) MISC  Continuous Blood Gluc Transmit (DEXCOM G6 TRANSMITTER) MISC  famotidine (PEPCID) 20 MG tablet  fluticasone (FLONASE) 50 MCG/ACT nasal spray  insulin aspart (NOVOLOG VIAL) 100 UNITS/ML vial  Insulin Glargine-yfgn 100 UNIT/ML SOPN  INSULIN PUMP - OUTPATIENT  KETOSTIX test strip  NOVOLOG FLEXPEN 100 UNIT/ML soln  QUEtiapine (SEROQUEL) 50 MG tablet  UBRELVY 100 MG tablet          Review of Systems   All other systems reviewed and are negative.      Physical Exam   BP: (!) 145/109  Pulse: (!) 122  Temp: 97.9  F (36.6  C)  Resp: 18  SpO2: 96 %      Physical Exam  Vitals and nursing  note reviewed.   Constitutional:       General: She is not in acute distress.     Appearance: She is well-developed. She is not diaphoretic.   HENT:      Head: Normocephalic and atraumatic.      Nose: Nose normal.      Mouth/Throat:      Pharynx: No oropharyngeal exudate.   Eyes:      Conjunctiva/sclera: Conjunctivae normal.   Cardiovascular:      Rate and Rhythm: Regular rhythm. Tachycardia present.      Heart sounds: Normal heart sounds. No murmur heard.     No friction rub.   Pulmonary:      Effort: Pulmonary effort is normal. No respiratory distress.      Breath sounds: Normal breath sounds. No stridor. No wheezing or rales.   Chest:      Chest wall: Tenderness present.   Abdominal:      General: Bowel sounds are normal. There is no distension.      Palpations: Abdomen is soft. There is no mass.      Tenderness: There is no abdominal tenderness. There is no guarding.   Musculoskeletal:         General: No tenderness. Normal range of motion.      Cervical back: Normal range of motion and neck supple.   Skin:     General: Skin is warm and dry.      Capillary Refill: Capillary refill takes less than 2 seconds.      Findings: No erythema.   Neurological:      Mental Status: She is alert and oriented to person, place, and time.   Psychiatric:         Mood and Affect: Mood is anxious. Affect is tearful.         Judgment: Judgment normal.         ED Course        Procedures        Results for orders placed or performed during the hospital encounter of 06/10/24 (from the past 24 hour(s))   CBC with platelets differential    Narrative    The following orders were created for panel order CBC with platelets differential.  Procedure                               Abnormality         Status                     ---------                               -----------         ------                     CBC with platelets and d...[[id number]]                      Final result                 Please view results for these tests on the  individual orders.   Basic metabolic panel   Result Value Ref Range    Sodium 139 135 - 145 mmol/L    Potassium 3.9 3.4 - 5.3 mmol/L    Chloride 105 98 - 107 mmol/L    Carbon Dioxide (CO2) 20 (L) 22 - 29 mmol/L    Anion Gap 14 7 - 15 mmol/L    Urea Nitrogen 13.2 6.0 - 20.0 mg/dL    Creatinine 0.59 0.51 - 0.95 mg/dL    GFR Estimate >90 >60 mL/min/1.73m2    Calcium 8.6 8.6 - 10.0 mg/dL    Glucose 257 (H) 70 - 99 mg/dL   Hepatic function panel   Result Value Ref Range    Protein Total 7.0 6.4 - 8.3 g/dL    Albumin 3.9 3.5 - 5.2 g/dL    Bilirubin Total <0.2 <=1.2 mg/dL    Alkaline Phosphatase 99 40 - 150 U/L    AST 17 0 - 45 U/L    ALT 15 0 - 50 U/L    Bilirubin Direct <0.20 0.00 - 0.30 mg/dL   Lipase   Result Value Ref Range    Lipase 47 13 - 60 U/L   Troponin T, High Sensitivity   Result Value Ref Range    Troponin T, High Sensitivity 9 <=14 ng/L   CBC with platelets and differential   Result Value Ref Range    WBC Count 6.9 4.0 - 11.0 10e3/uL    RBC Count 3.95 3.80 - 5.20 10e6/uL    Hemoglobin 12.6 11.7 - 15.7 g/dL    Hematocrit 36.0 35.0 - 47.0 %    MCV 91 78 - 100 fL    MCH 31.9 26.5 - 33.0 pg    MCHC 35.0 31.5 - 36.5 g/dL    RDW 12.9 10.0 - 15.0 %    Platelet Count 383 150 - 450 10e3/uL    % Neutrophils 48 %    % Lymphocytes 43 %    % Monocytes 6 %    % Eosinophils 2 %    % Basophils 1 %    % Immature Granulocytes 0 %    NRBCs per 100 WBC 0 <1 /100    Absolute Neutrophils 3.3 1.6 - 8.3 10e3/uL    Absolute Lymphocytes 2.9 0.8 - 5.3 10e3/uL    Absolute Monocytes 0.4 0.0 - 1.3 10e3/uL    Absolute Eosinophils 0.1 0.0 - 0.7 10e3/uL    Absolute Basophils 0.1 0.0 - 0.2 10e3/uL    Absolute Immature Granulocytes 0.0 <=0.4 10e3/uL    Absolute NRBCs 0.0 10e3/uL   CT Chest Pulmonary Embolism w Contrast    Narrative    EXAM: CT CHEST PULMONARY EMBOLISM W CONTRAST  LOCATION: [facility]  DATE: 6/10/2024    INDICATION: chest pain, tachycardia  COMPARISON: CT from 6/18/2023.  TECHNIQUE: CT chest  pulmonary angiogram during arterial phase injection of IV contrast. Multiplanar reformats and MIP reconstructions were performed. Dose reduction techniques were used.   CONTRAST: ISOVUE 370, 58 mL    FINDINGS:  ANGIOGRAM CHEST: Pulmonary arteries are normal caliber and negative for pulmonary emboli. Thoracic aorta is negative for dissection. No CT evidence of right heart strain.    LUNGS AND PLEURA: No acute airspace infiltrate. The central airways and pleural spaces are clear.    MEDIASTINUM/AXILLAE: Normal heart size. No pericardial effusion.    CORONARY ARTERY CALCIFICATION: None.    UPPER ABDOMEN: Colonic diverticula.    MUSCULOSKELETAL: Normal.      Impression    IMPRESSION:  1.  Negative for pulmonary embolism.    2.  No evidence of pneumonia or pulmonary edema.       Medications   ondansetron (ZOFRAN) injection 4 mg (4 mg Intravenous $Given 6/10/24 2127)   sodium chloride 0.9% BOLUS 1,000 mL (1,000 mLs Intravenous $New Bag 6/10/24 2122)   ketorolac (TORADOL) injection 30 mg (30 mg Intravenous $Given 6/10/24 2125)   LORazepam (ATIVAN) injection 1 mg (1 mg Intravenous $Given 6/10/24 2124)   iopamidol (ISOVUE-370) solution 500 mL (58 mLs Intravenous $Given 6/10/24 2141)   sodium chloride 0.9 % bag 100mL for CT scan flush use (70 mLs Intravenous $Given 6/10/24 2141)     Labs will come back and were mostly unremarkable, patient's blood sugar was slightly elevated in the 240s the patient had just eaten a peanut butter sandwich.  Because this is the patient's second visit for this chest pain and she really wanted to make sure we were not missing anything we did proceed with a CT scan of her chest which was negative for PE or any intrathoracic process.  Troponin was negative also.  I reassured patient and told her I think this is likely more chest wall pain or maybe possibly pleurisy.  I think also her anxiety is contributing to this.  Recommend that they use ibuprofen to help with the pain.  Patient should also  follow-up with her doctor to discuss further treatment options.    Assessments & Plan (with Medical Decision Making)  Chest wall pain, anxiety     I have reviewed the nursing notes.    I have reviewed the findings, diagnosis, plan and need for follow up with the patient.      New Prescriptions    No medications on file       Final diagnoses:   Chest wall pain   Anxiety       6/10/2024   [facility] EMERGENCY DEPT       Artemio Noonan MD  06/10/24 2361

## 2024-06-30 ENCOUNTER — HEALTH MAINTENANCE LETTER (OUTPATIENT)
Age: 39
End: 2024-06-30

## 2024-07-12 ENCOUNTER — HOSPITAL ENCOUNTER (EMERGENCY)
Facility: CLINIC | Age: 39
Discharge: HOME | End: 2024-07-13
Attending: FAMILY MEDICINE | Admitting: FAMILY MEDICINE
Payer: COMMERCIAL

## 2024-07-12 VITALS
HEART RATE: 107 BPM | DIASTOLIC BLOOD PRESSURE: 90 MMHG | OXYGEN SATURATION: 100 % | WEIGHT: 135 LBS | SYSTOLIC BLOOD PRESSURE: 129 MMHG | BODY MASS INDEX: 21.79 KG/M2 | RESPIRATION RATE: 20 BRPM | TEMPERATURE: 98.2 F

## 2024-07-12 DIAGNOSIS — R10.11 RUQ ABDOMINAL PAIN: ICD-10-CM

## 2024-07-12 DIAGNOSIS — R10.31 RLQ ABDOMINAL PAIN: ICD-10-CM

## 2024-07-12 LAB
ALBUMIN SERPL BCG-MCNC: 4.2 G/DL (ref 3.5–5.2)
ALBUMIN UR-MCNC: NEGATIVE MG/DL
ALP SERPL-CCNC: 95 U/L (ref 40–150)
ALT SERPL W P-5'-P-CCNC: 23 U/L (ref 0–50)
ANION GAP SERPL CALCULATED.3IONS-SCNC: 15 MMOL/L (ref 7–15)
APPEARANCE UR: ABNORMAL
AST SERPL W P-5'-P-CCNC: 28 U/L (ref 0–45)
BASOPHILS # BLD AUTO: 0 10E3/UL (ref 0–0.2)
BASOPHILS NFR BLD AUTO: 1 %
BILIRUB SERPL-MCNC: 0.3 MG/DL
BILIRUB UR QL STRIP: NEGATIVE
BUN SERPL-MCNC: 19.8 MG/DL (ref 6–20)
CALCIUM SERPL-MCNC: 9.3 MG/DL (ref 8.6–10)
CHLORIDE SERPL-SCNC: 102 MMOL/L (ref 98–107)
COLOR UR AUTO: YELLOW
CREAT SERPL-MCNC: 0.68 MG/DL (ref 0.51–0.95)
D DIMER PPP FEU-MCNC: <0.27 UG/ML FEU (ref 0–0.5)
DEPRECATED HCO3 PLAS-SCNC: 20 MMOL/L (ref 22–29)
EGFRCR SERPLBLD CKD-EPI 2021: >90 ML/MIN/1.73M2
EOSINOPHIL # BLD AUTO: 0.1 10E3/UL (ref 0–0.7)
EOSINOPHIL NFR BLD AUTO: 2 %
ERYTHROCYTE [DISTWIDTH] IN BLOOD BY AUTOMATED COUNT: 13.1 % (ref 10–15)
GLUCOSE SERPL-MCNC: 166 MG/DL (ref 70–99)
GLUCOSE UR STRIP-MCNC: NEGATIVE MG/DL
HCT VFR BLD AUTO: 37 % (ref 35–47)
HGB BLD-MCNC: 12.9 G/DL (ref 11.7–15.7)
HGB UR QL STRIP: NEGATIVE
IMM GRANULOCYTES # BLD: 0 10E3/UL
IMM GRANULOCYTES NFR BLD: 0 %
KETONES UR STRIP-MCNC: 20 MG/DL
LACTATE SERPL-SCNC: 0.9 MMOL/L (ref 0.7–2)
LEUKOCYTE ESTERASE UR QL STRIP: NEGATIVE
LIPASE SERPL-CCNC: 31 U/L (ref 13–60)
LYMPHOCYTES # BLD AUTO: 2.1 10E3/UL (ref 0.8–5.3)
LYMPHOCYTES NFR BLD AUTO: 35 %
MCH RBC QN AUTO: 31.7 PG (ref 26.5–33)
MCHC RBC AUTO-ENTMCNC: 34.9 G/DL (ref 31.5–36.5)
MCV RBC AUTO: 91 FL (ref 78–100)
MONOCYTES # BLD AUTO: 0.6 10E3/UL (ref 0–1.3)
MONOCYTES NFR BLD AUTO: 10 %
MUCOUS THREADS #/AREA URNS LPF: PRESENT /LPF
NEUTROPHILS # BLD AUTO: 3.2 10E3/UL (ref 1.6–8.3)
NEUTROPHILS NFR BLD AUTO: 53 %
NITRATE UR QL: NEGATIVE
NRBC # BLD AUTO: 0 10E3/UL
NRBC BLD AUTO-RTO: 0 /100
PH UR STRIP: 5 [PH] (ref 5–7)
PLATELET # BLD AUTO: 405 10E3/UL (ref 150–450)
POTASSIUM SERPL-SCNC: 4.9 MMOL/L (ref 3.4–5.3)
PROT SERPL-MCNC: 7.5 G/DL (ref 6.4–8.3)
RBC # BLD AUTO: 4.07 10E6/UL (ref 3.8–5.2)
RBC URINE: 0 /HPF
SODIUM SERPL-SCNC: 137 MMOL/L (ref 135–145)
SP GR UR STRIP: 1.03 (ref 1–1.03)
SQUAMOUS EPITHELIAL: 6 /HPF
UROBILINOGEN UR STRIP-MCNC: NORMAL MG/DL
WBC # BLD AUTO: 5.9 10E3/UL (ref 4–11)
WBC URINE: 0 /HPF

## 2024-07-12 PROCEDURE — 85379 FIBRIN DEGRADATION QUANT: CPT | Performed by: FAMILY MEDICINE

## 2024-07-12 PROCEDURE — 258N000003 HC RX IP 258 OP 636: Performed by: FAMILY MEDICINE

## 2024-07-12 PROCEDURE — 85025 COMPLETE CBC W/AUTO DIFF WBC: CPT | Performed by: FAMILY MEDICINE

## 2024-07-12 PROCEDURE — 36415 COLL VENOUS BLD VENIPUNCTURE: CPT | Performed by: FAMILY MEDICINE

## 2024-07-12 PROCEDURE — 83690 ASSAY OF LIPASE: CPT | Performed by: FAMILY MEDICINE

## 2024-07-12 PROCEDURE — 80053 COMPREHEN METABOLIC PANEL: CPT | Performed by: FAMILY MEDICINE

## 2024-07-12 PROCEDURE — 96361 HYDRATE IV INFUSION ADD-ON: CPT | Performed by: FAMILY MEDICINE

## 2024-07-12 PROCEDURE — 83605 ASSAY OF LACTIC ACID: CPT | Performed by: FAMILY MEDICINE

## 2024-07-12 PROCEDURE — 96375 TX/PRO/DX INJ NEW DRUG ADDON: CPT | Performed by: FAMILY MEDICINE

## 2024-07-12 PROCEDURE — 99284 EMERGENCY DEPT VISIT MOD MDM: CPT | Performed by: FAMILY MEDICINE

## 2024-07-12 PROCEDURE — 81001 URINALYSIS AUTO W/SCOPE: CPT | Performed by: FAMILY MEDICINE

## 2024-07-12 PROCEDURE — 250N000011 HC RX IP 250 OP 636: Performed by: FAMILY MEDICINE

## 2024-07-12 PROCEDURE — 96374 THER/PROPH/DIAG INJ IV PUSH: CPT | Performed by: FAMILY MEDICINE

## 2024-07-12 PROCEDURE — 99284 EMERGENCY DEPT VISIT MOD MDM: CPT | Mod: 25 | Performed by: FAMILY MEDICINE

## 2024-07-12 RX ORDER — KETOROLAC TROMETHAMINE 30 MG/ML
30 INJECTION, SOLUTION INTRAMUSCULAR; INTRAVENOUS ONCE
Status: COMPLETED | OUTPATIENT
Start: 2024-07-12 | End: 2024-07-12

## 2024-07-12 RX ORDER — ONDANSETRON 2 MG/ML
4 INJECTION INTRAMUSCULAR; INTRAVENOUS EVERY 30 MIN PRN
Status: DISCONTINUED | OUTPATIENT
Start: 2024-07-12 | End: 2024-07-13 | Stop reason: HOSPADM

## 2024-07-12 RX ADMIN — KETOROLAC TROMETHAMINE 30 MG: 30 INJECTION, SOLUTION INTRAMUSCULAR at 23:06

## 2024-07-12 RX ADMIN — SODIUM CHLORIDE 1000 ML: 9 INJECTION, SOLUTION INTRAVENOUS at 23:04

## 2024-07-12 RX ADMIN — ONDANSETRON 4 MG: 2 INJECTION INTRAMUSCULAR; INTRAVENOUS at 23:06

## 2024-07-12 ASSESSMENT — COLUMBIA-SUICIDE SEVERITY RATING SCALE - C-SSRS
2. HAVE YOU ACTUALLY HAD ANY THOUGHTS OF KILLING YOURSELF IN THE PAST MONTH?: NO
6. HAVE YOU EVER DONE ANYTHING, STARTED TO DO ANYTHING, OR PREPARED TO DO ANYTHING TO END YOUR LIFE?: NO
1. IN THE PAST MONTH, HAVE YOU WISHED YOU WERE DEAD OR WISHED YOU COULD GO TO SLEEP AND NOT WAKE UP?: NO

## 2024-07-12 ASSESSMENT — ACTIVITIES OF DAILY LIVING (ADL)
ADLS_ACUITY_SCORE: 33
ADLS_ACUITY_SCORE: 35

## 2024-07-13 LAB — HOLD SPECIMEN: NORMAL

## 2024-07-13 NOTE — ED TRIAGE NOTES
Pt here with nausea for couple days.  Severe abdominal pain and bloating started around 2pm.  Also cut toe on right foot and states a rash is going up her leg from the toe.         Triage Assessment (Adult)       Row Name 07/12/24 7117          Triage Assessment    Airway WDL WDL        Respiratory WDL    Respiratory WDL WDL

## 2024-07-13 NOTE — ED PROVIDER NOTES
History     Chief Complaint   Patient presents with    Abdominal Pain     HPI  Heidi Su is a 39 year old female who presents to the ED with right upper quadrant abdominal pain that started gradually around 2 PM today.  She had not eaten anything up to that point.  She did have symptoms around 8 PM tonight.  Pain worsened and then she felt very bloated.  Movement increases the pain.  She was nauseous but did not vomit.  Is passing flatus.  Last bowel movement was yesterday and was normal.  That is not unusual for her.    Also cut the cuticle on her right fourth toe and now that toe feels swollen.  No red streaking up the leg but she does have a superficial wound on the posterior lateral right calf with tenderness and cramping in that calf.    Has a history of hypokalemia and hypophosphatemia.    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  She had a laparotomy and then an incidental appendectomy.  Also hysterectomy and bilateral oophorectomy.    Type I diabetic on an insulin pump.    Allergies:  Allergies   Allergen Reactions    No Known Allergies        Problem List:    Patient Active Problem List    Diagnosis Date Noted    Hypophosphatemia 05/21/2023     Priority: Medium    Hypokalemia 05/20/2023     Priority: Medium    Anemia, unspecified type 05/20/2023     Priority: Medium    Surgical abdomen 05/19/2023     Priority: Medium    Free intraperitoneal air 05/19/2023     Priority: Medium    Type 1 diabetes mellitus with hyperglycemia (H) 05/19/2023     Priority: Medium    Insulin pump status 05/19/2023     Priority: Medium    ADHD (attention deficit hyperactivity disorder) 05/19/2023     Priority: Medium    Depression with anxiety 05/19/2023     Priority: Medium    SIRS (systemic inflammatory response syndrome) (H) 05/19/2023     Priority: Medium    Diabetic ketoacidosis without coma associated with type 1 diabetes mellitus (H) 05/19/2023     Priority:  Medium    Thrombosis of right saphenous vein 05/19/2023     Priority: Medium    CARDIOVASCULAR SCREENING; LDL GOAL LESS THAN 160 04/03/2012     Priority: Medium        Past Medical History:    Past Medical History:   Diagnosis Date    Anxiety     Bronchitis     Depressive disorder     Diabetes (H)     NO ACTIVE PROBLEMS     Ovarian cyst        Past Surgical History:    Past Surgical History:   Procedure Laterality Date    APPENDECTOMY OPEN N/A 5/19/2023    Procedure: Open Appendectomy;  Surgeon: Jesse Blackman MD;  Location: [facility]    CYSTECTOMY OVARIAN BENIGN      DILATION AND CURETTAGE      DILATION AND CURETTAGE  2011    GYN SURGERY      HEMORRHOID SURGERY      LAPAROSCOPY DIAGNOSTIC (GENERAL) N/A 5/19/2023    Procedure: LAPAROSCOPY, DIAGNOSTIC;  Surgeon: Jesse Blackman MD;  Location: [facility]    LAPAROTOMY EXPLORATORY N/A 5/19/2023    Procedure: LAPAROTOMY;  Surgeon: Jesse Blackman MD;  Location: [facility]       Family History:    Family History   Problem Relation Age of Onset    Cerebrovascular Disease Maternal Grandfather     Breast Cancer Maternal Grandfather     Asthma Sister     Diabetes Sister 14       Social History:  Marital Status:  Single [1]  Social History     Tobacco Use    Smoking status: Every Day     Current packs/day: 0.25     Types: Cigarettes    Smokeless tobacco: Never   Substance Use Topics    Alcohol use: Yes     Comment: 1-2x/month    Drug use: No        Medications:    acetaminophen (TYLENOL) 500 MG tablet  amphetamine-dextroamphetamine (ADDERALL) 20 MG tablet  blood glucose (CONTOUR NEXT TEST) test strip  clonazePAM (KLONOPIN) 0.5 MG tablet  Continuous Blood Gluc Sensor (DEXCOM G6 SENSOR) MISC  Continuous Blood Gluc Transmit (DEXCOM G6 TRANSMITTER) MISC  famotidine (PEPCID) 20 MG tablet  fluticasone (FLONASE) 50 MCG/ACT nasal spray  insulin aspart (NOVOLOG VIAL) 100 UNITS/ML vial  Insulin Glargine-yfgn 100 UNIT/ML SOPN  INSULIN PUMP - OUTPATIENT  KETOSTIX test strip  NOVOLOG FLEXPEN 100  UNIT/ML soln  QUEtiapine (SEROQUEL) 50 MG tablet  UBRELVY 100 MG tablet          Review of Systems   All other systems reviewed and are negative.      Physical Exam   BP: (!) 129/90  Pulse: 107  Temp: 98.2  F (36.8  C)  Resp: 20  Weight: 61.2 kg (135 lb)  SpO2: 100 %      Physical Exam  Constitutional:       General: She is not in acute distress.     Appearance: She is well-developed.   HENT:      Mouth/Throat:      Mouth: Mucous membranes are moist.   Cardiovascular:      Rate and Rhythm: Normal rate and regular rhythm.   Pulmonary:      Effort: Pulmonary effort is normal.      Breath sounds: Normal breath sounds.   Abdominal:      Tenderness: There is abdominal tenderness in the right upper quadrant and right lower quadrant. There is right CVA tenderness. There is no rebound.   Skin:     Findings: Erythema (Superficial wound approximately 5 mm diameter right posterior lateral calf.  There is some erythema just proximal to this.) present.      Comments: Tender over the right fourth toe dorsally.  Maybe some mild swelling.  Faint erythema.  No lymphangitis.   Neurological:      Mental Status: She is alert.         ED Course        Procedures              Critical Care time:  none               Results for orders placed or performed during the hospital encounter of 07/12/24 (from the past 24 hour(s))   CBC with platelets differential    Narrative    The following orders were created for panel order CBC with platelets differential.  Procedure                               Abnormality         Status                     ---------                               -----------         ------                     CBC with platelets and d...[[id number]]                      Final result                 Please view results for these tests on the individual orders.   Comprehensive metabolic panel   Result Value Ref Range    Sodium 137 135 - 145 mmol/L    Potassium 4.9 3.4 - 5.3 mmol/L    Carbon Dioxide (CO2) 20 (L) 22 - 29 mmol/L     Anion Gap 15 7 - 15 mmol/L    Urea Nitrogen 19.8 6.0 - 20.0 mg/dL    Creatinine 0.68 0.51 - 0.95 mg/dL    GFR Estimate >90 >60 mL/min/1.73m2    Calcium 9.3 8.6 - 10.0 mg/dL    Chloride 102 98 - 107 mmol/L    Glucose 166 (H) 70 - 99 mg/dL    Alkaline Phosphatase 95 40 - 150 U/L    AST 28 0 - 45 U/L    ALT 23 0 - 50 U/L    Protein Total 7.5 6.4 - 8.3 g/dL    Albumin 4.2 3.5 - 5.2 g/dL    Bilirubin Total 0.3 <=1.2 mg/dL   Lipase   Result Value Ref Range    Lipase 31 13 - 60 U/L   Lactic acid whole blood with 1x repeat in 2 hr when >2   Result Value Ref Range    Lactic Acid, Initial 0.9 0.7 - 2.0 mmol/L   D dimer quantitative   Result Value Ref Range    D-Dimer Quantitative <0.27 0.00 - 0.50 ug/mL FEU    Narrative    This D-dimer assay is intended for use in conjunction with a clinical pretest probability assessment model to exclude pulmonary embolism (PE) and deep venous thrombosis (DVT) in outpatients suspected of PE or DVT. The cut-off value is 0.50 ug/mL FEU.   CBC with platelets and differential   Result Value Ref Range    WBC Count 5.9 4.0 - 11.0 10e3/uL    RBC Count 4.07 3.80 - 5.20 10e6/uL    Hemoglobin 12.9 11.7 - 15.7 g/dL    Hematocrit 37.0 35.0 - 47.0 %    MCV 91 78 - 100 fL    MCH 31.7 26.5 - 33.0 pg    MCHC 34.9 31.5 - 36.5 g/dL    RDW 13.1 10.0 - 15.0 %    Platelet Count 405 150 - 450 10e3/uL    % Neutrophils 53 %    % Lymphocytes 35 %    % Monocytes 10 %    % Eosinophils 2 %    % Basophils 1 %    % Immature Granulocytes 0 %    NRBCs per 100 WBC 0 <1 /100    Absolute Neutrophils 3.2 1.6 - 8.3 10e3/uL    Absolute Lymphocytes 2.1 0.8 - 5.3 10e3/uL    Absolute Monocytes 0.6 0.0 - 1.3 10e3/uL    Absolute Eosinophils 0.1 0.0 - 0.7 10e3/uL    Absolute Basophils 0.0 0.0 - 0.2 10e3/uL    Absolute Immature Granulocytes 0.0 <=0.4 10e3/uL    Absolute NRBCs 0.0 10e3/uL   UA with Microscopic reflex to Culture    Specimen: Urine, Clean Catch   Result Value Ref Range    Color Urine Yellow Colorless, Straw, Light  Yellow, Yellow    Appearance Urine Slightly Cloudy (A) Clear    Glucose Urine Negative Negative mg/dL    Bilirubin Urine Negative Negative    Ketones Urine 20 (A) Negative mg/dL    Specific Gravity Urine 1.030 1.003 - 1.035    Blood Urine Negative Negative    pH Urine 5.0 5.0 - 7.0    Protein Albumin Urine Negative Negative mg/dL    Urobilinogen Urine Normal Normal, 2.0 mg/dL    Nitrite Urine Negative Negative    Leukocyte Esterase Urine Negative Negative    Mucus Urine Present (A) None Seen /LPF    RBC Urine 0 <=2 /HPF    WBC Urine 0 <=5 /HPF    Squamous Epithelials Urine 6 (H) <=1 /HPF    Narrative    Urine Culture not indicated   Extra Tube ([place] Draw)    Narrative    The following orders were created for panel order Extra Tube ([place] Draw).  Procedure                               Abnormality         Status                     ---------                               -----------         ------                     Extra Blood Culture Bottle[[id number]]                       Final result                 Please view results for these tests on the individual orders.   Extra Blood Culture Bottle   Result Value Ref Range    Hold Specimen JI        Medications   sodium chloride 0.9% BOLUS 1,000 mL (0 mLs Intravenous Stopped 7/13/24 0014)   ketorolac (TORADOL) injection 30 mg (30 mg Intravenous $Given 7/12/24 2306)       Assessments & Plan (with Medical Decision Making)  39-year-old female developed right-sided abdominal pain around 2 PM this afternoon.  Is gradual in onset and worsened and that she felt very bloated and could see her stomach getting bigger.  She is passing flatus.  Some slight nausea but no vomiting.  Last bowel movement was yesterday and was normal.  Not unusual for her.  Toradol for pain.  Zofran for nausea.  Basic abdominal pain labs sent.  Also has a cut on her left fourth toe over the cuticle.  Very superficial.  She is worried about infection.  She has some tenderness in the right calf and  feels like it is cramping.  D-dimer added to her labs.  CBC was unremarkable.  Urine was clear.  D-dimer undetectable.  Comprehensive profile unremarkable except glucose of 166.  Lipase was normal.  Lactic acid normal.  I had offered to do a right upper quadrant ultrasound but she had to get going to  her daughter so she elected to leave the emergency department before that could be accomplished and before I could get into review her discharge instructions.  Reportedly her pain had improved.  She knows she can return at any time if she has persistent problems or worsens in any way.     I have reviewed the nursing notes.    I have reviewed the findings, diagnosis, plan and need for follow up with the patient.           Medical Decision Making  The patient's presentation was of moderate complexity (an undiagnosed new problem with uncertain prognosis).    The patient's evaluation involved:  ordering and/or review of 3+ test(s) in this encounter (see separate area of note for details)    The patient's management necessitated moderate risk (prescription drug management including medications given in the ED).        Discharge Medication List as of 7/13/2024 12:16 AM          Final diagnoses:   RUQ abdominal pain   RLQ abdominal pain - Appendix is surgically absent.       7/12/2024   [facility] EMERGENCY DEPT       Patricio Campbell MD  07/13/24 0606

## 2024-07-13 NOTE — ED NOTES
Pt told RN that her daughter needed her urgently to come home and pt needed to leave ED. Pt did not want to wait for US. Pt states pain RUQ was feeling better and will follow up with pcp. Pt left stable, ambulatory.

## 2024-07-13 NOTE — DISCHARGE INSTRUCTIONS
Patient left before discharge instructions could be reviewed.  She can return at any time if she has persistent problems or worsens in any way.

## 2024-08-07 ENCOUNTER — HOSPITAL ENCOUNTER (EMERGENCY)
Facility: CLINIC | Age: 39
Discharge: HOME | End: 2024-08-07
Attending: STUDENT IN AN ORGANIZED HEALTH CARE EDUCATION/TRAINING PROGRAM | Admitting: STUDENT IN AN ORGANIZED HEALTH CARE EDUCATION/TRAINING PROGRAM
Payer: COMMERCIAL

## 2024-08-07 VITALS
WEIGHT: 140 LBS | TEMPERATURE: 97.6 F | BODY MASS INDEX: 22.5 KG/M2 | HEART RATE: 79 BPM | SYSTOLIC BLOOD PRESSURE: 121 MMHG | DIASTOLIC BLOOD PRESSURE: 97 MMHG | RESPIRATION RATE: 22 BRPM | OXYGEN SATURATION: 95 % | HEIGHT: 66 IN

## 2024-08-07 DIAGNOSIS — R73.9 HYPERGLYCEMIA: ICD-10-CM

## 2024-08-07 DIAGNOSIS — R20.0 NUMBNESS: ICD-10-CM

## 2024-08-07 DIAGNOSIS — R52 GENERALIZED BODY ACHES: ICD-10-CM

## 2024-08-07 LAB
ALBUMIN SERPL BCG-MCNC: 4.3 G/DL (ref 3.5–5.2)
ALP SERPL-CCNC: 97 U/L (ref 40–150)
ALT SERPL W P-5'-P-CCNC: 33 U/L (ref 0–50)
ANION GAP SERPL CALCULATED.3IONS-SCNC: 16 MMOL/L (ref 7–15)
AST SERPL W P-5'-P-CCNC: 25 U/L (ref 0–45)
BASE EXCESS BLDV CALC-SCNC: -3.7 MMOL/L (ref -3–3)
BASOPHILS # BLD AUTO: 0 10E3/UL (ref 0–0.2)
BASOPHILS NFR BLD AUTO: 0 %
BILIRUB SERPL-MCNC: 0.2 MG/DL
BUN SERPL-MCNC: 18.3 MG/DL (ref 6–20)
CALCIUM SERPL-MCNC: 9.6 MG/DL (ref 8.8–10.4)
CHLORIDE SERPL-SCNC: 104 MMOL/L (ref 98–107)
CREAT SERPL-MCNC: 0.7 MG/DL (ref 0.51–0.95)
CRP SERPL-MCNC: <3 MG/L
EGFRCR SERPLBLD CKD-EPI 2021: >90 ML/MIN/1.73M2
EOSINOPHIL # BLD AUTO: 0 10E3/UL (ref 0–0.7)
EOSINOPHIL NFR BLD AUTO: 0 %
ERYTHROCYTE [DISTWIDTH] IN BLOOD BY AUTOMATED COUNT: 13.2 % (ref 10–15)
ERYTHROCYTE [SEDIMENTATION RATE] IN BLOOD BY WESTERGREN METHOD: 13 MM/HR (ref 0–20)
FLUAV RNA SPEC QL NAA+PROBE: NEGATIVE
FLUBV RNA RESP QL NAA+PROBE: NEGATIVE
GLUCOSE SERPL-MCNC: 276 MG/DL (ref 70–99)
HCG SERPL QL: NEGATIVE
HCO3 BLDV-SCNC: 21 MMOL/L (ref 21–28)
HCO3 SERPL-SCNC: 19 MMOL/L (ref 22–29)
HCT VFR BLD AUTO: 40.5 % (ref 35–47)
HGB BLD-MCNC: 13.8 G/DL (ref 11.7–15.7)
IMM GRANULOCYTES # BLD: 0 10E3/UL
IMM GRANULOCYTES NFR BLD: 0 %
LYMPHOCYTES # BLD AUTO: 0.7 10E3/UL (ref 0.8–5.3)
LYMPHOCYTES NFR BLD AUTO: 8 %
MAGNESIUM SERPL-MCNC: 1.9 MG/DL (ref 1.7–2.3)
MCH RBC QN AUTO: 31.7 PG (ref 26.5–33)
MCHC RBC AUTO-ENTMCNC: 34.1 G/DL (ref 31.5–36.5)
MCV RBC AUTO: 93 FL (ref 78–100)
MONOCYTES # BLD AUTO: 0.1 10E3/UL (ref 0–1.3)
MONOCYTES NFR BLD AUTO: 1 %
NEUTROPHILS # BLD AUTO: 8.2 10E3/UL (ref 1.6–8.3)
NEUTROPHILS NFR BLD AUTO: 90 %
NRBC # BLD AUTO: 0 10E3/UL
NRBC BLD AUTO-RTO: 0 /100
O2/TOTAL GAS SETTING VFR VENT: 21 %
OXYHGB MFR BLDV: 91 % (ref 70–75)
PCO2 BLDV: 36 MM HG (ref 40–50)
PH BLDV: 7.37 [PH] (ref 7.32–7.43)
PLATELET # BLD AUTO: 367 10E3/UL (ref 150–450)
PO2 BLDV: 75 MM HG (ref 25–47)
POTASSIUM SERPL-SCNC: 4.4 MMOL/L (ref 3.4–5.3)
PROT SERPL-MCNC: 7.7 G/DL (ref 6.4–8.3)
RBC # BLD AUTO: 4.36 10E6/UL (ref 3.8–5.2)
RSV RNA SPEC NAA+PROBE: NEGATIVE
SAO2 % BLDV: 95.8 % (ref 70–75)
SARS-COV-2 RNA RESP QL NAA+PROBE: NEGATIVE
SODIUM SERPL-SCNC: 139 MMOL/L (ref 135–145)
WBC # BLD AUTO: 9 10E3/UL (ref 4–11)

## 2024-08-07 PROCEDURE — 85049 AUTOMATED PLATELET COUNT: CPT | Performed by: STUDENT IN AN ORGANIZED HEALTH CARE EDUCATION/TRAINING PROGRAM

## 2024-08-07 PROCEDURE — 84703 CHORIONIC GONADOTROPIN ASSAY: CPT | Performed by: STUDENT IN AN ORGANIZED HEALTH CARE EDUCATION/TRAINING PROGRAM

## 2024-08-07 PROCEDURE — 85652 RBC SED RATE AUTOMATED: CPT | Performed by: STUDENT IN AN ORGANIZED HEALTH CARE EDUCATION/TRAINING PROGRAM

## 2024-08-07 PROCEDURE — 82962 GLUCOSE BLOOD TEST: CPT

## 2024-08-07 PROCEDURE — 87637 SARSCOV2&INF A&B&RSV AMP PRB: CPT | Performed by: STUDENT IN AN ORGANIZED HEALTH CARE EDUCATION/TRAINING PROGRAM

## 2024-08-07 PROCEDURE — 86140 C-REACTIVE PROTEIN: CPT | Performed by: STUDENT IN AN ORGANIZED HEALTH CARE EDUCATION/TRAINING PROGRAM

## 2024-08-07 PROCEDURE — 80053 COMPREHEN METABOLIC PANEL: CPT | Performed by: STUDENT IN AN ORGANIZED HEALTH CARE EDUCATION/TRAINING PROGRAM

## 2024-08-07 PROCEDURE — 250N000011 HC RX IP 250 OP 636: Performed by: STUDENT IN AN ORGANIZED HEALTH CARE EDUCATION/TRAINING PROGRAM

## 2024-08-07 PROCEDURE — 99284 EMERGENCY DEPT VISIT MOD MDM: CPT | Mod: 25 | Performed by: STUDENT IN AN ORGANIZED HEALTH CARE EDUCATION/TRAINING PROGRAM

## 2024-08-07 PROCEDURE — 99284 EMERGENCY DEPT VISIT MOD MDM: CPT | Performed by: STUDENT IN AN ORGANIZED HEALTH CARE EDUCATION/TRAINING PROGRAM

## 2024-08-07 PROCEDURE — 83735 ASSAY OF MAGNESIUM: CPT | Performed by: STUDENT IN AN ORGANIZED HEALTH CARE EDUCATION/TRAINING PROGRAM

## 2024-08-07 PROCEDURE — 258N000003 HC RX IP 258 OP 636: Performed by: STUDENT IN AN ORGANIZED HEALTH CARE EDUCATION/TRAINING PROGRAM

## 2024-08-07 PROCEDURE — 96361 HYDRATE IV INFUSION ADD-ON: CPT | Performed by: STUDENT IN AN ORGANIZED HEALTH CARE EDUCATION/TRAINING PROGRAM

## 2024-08-07 PROCEDURE — 96374 THER/PROPH/DIAG INJ IV PUSH: CPT | Performed by: STUDENT IN AN ORGANIZED HEALTH CARE EDUCATION/TRAINING PROGRAM

## 2024-08-07 PROCEDURE — 36415 COLL VENOUS BLD VENIPUNCTURE: CPT | Performed by: STUDENT IN AN ORGANIZED HEALTH CARE EDUCATION/TRAINING PROGRAM

## 2024-08-07 PROCEDURE — 96375 TX/PRO/DX INJ NEW DRUG ADDON: CPT | Performed by: STUDENT IN AN ORGANIZED HEALTH CARE EDUCATION/TRAINING PROGRAM

## 2024-08-07 PROCEDURE — 82805 BLOOD GASES W/O2 SATURATION: CPT | Performed by: STUDENT IN AN ORGANIZED HEALTH CARE EDUCATION/TRAINING PROGRAM

## 2024-08-07 RX ORDER — KETOROLAC TROMETHAMINE 15 MG/ML
15 INJECTION, SOLUTION INTRAMUSCULAR; INTRAVENOUS ONCE
Status: COMPLETED | OUTPATIENT
Start: 2024-08-07 | End: 2024-08-07

## 2024-08-07 RX ORDER — HYDROMORPHONE HYDROCHLORIDE 1 MG/ML
0.5 INJECTION, SOLUTION INTRAMUSCULAR; INTRAVENOUS; SUBCUTANEOUS
Status: COMPLETED | OUTPATIENT
Start: 2024-08-07 | End: 2024-08-07

## 2024-08-07 RX ORDER — DIPHENHYDRAMINE HYDROCHLORIDE 50 MG/ML
25 INJECTION INTRAMUSCULAR; INTRAVENOUS ONCE
Status: COMPLETED | OUTPATIENT
Start: 2024-08-07 | End: 2024-08-07

## 2024-08-07 RX ADMIN — HYDROMORPHONE HYDROCHLORIDE 0.5 MG: 1 INJECTION, SOLUTION INTRAMUSCULAR; INTRAVENOUS; SUBCUTANEOUS at 17:11

## 2024-08-07 RX ADMIN — SODIUM CHLORIDE 1000 ML: 9 INJECTION, SOLUTION INTRAVENOUS at 16:07

## 2024-08-07 RX ADMIN — KETOROLAC TROMETHAMINE 15 MG: 15 INJECTION, SOLUTION INTRAMUSCULAR; INTRAVENOUS at 16:11

## 2024-08-07 ASSESSMENT — ACTIVITIES OF DAILY LIVING (ADL)
ADLS_ACUITY_SCORE: 35
ADLS_ACUITY_SCORE: 35

## 2024-08-07 NOTE — DISCHARGE INSTRUCTIONS
The exact cause of your symptoms is unclear, but I think you are experiencing complex/atypical migraines.    Your blood sugar was high, but this could be related to stress and the steroids you took earlier.  The rest of your lab work was very reassuring.    We did discuss performing a CT/MRI today, but since you have had similar symptoms in the past and feel improved with medications, I think it is reasonable to hold off on these for now.    You should follow-up closely with your primary doctor and neurologist for further recommendations.  Return to the emergency department immediately for any new or worsening symptoms, particularly any severe vision changes, weakness, or lethargy/behavior changes.

## 2024-08-07 NOTE — Clinical Note
Heidi Su was seen and treated in our emergency department on 8/7/2024.  She may return to work on 08/09/2024.       If you have any questions or concerns, please don't hesitate to call.      Harrison Faustin MD

## 2024-08-07 NOTE — ED PROVIDER NOTES
History     Chief Complaint   Patient presents with    Numbness     HPI  Heidi Su is a 39 year old female with history of type 1 diabetes, complex migraines who presents for evaluation of numbness and whole body heaviness sensation.  Patient awoke from sleep this morning around 9 AM and felt a sensation of almost complete numbness throughout her body below the neck.  She also had a sensation of myalgias/body aches and possible weakness of the right sided extremities.  Additionally she describes having some blurriness in vision to the right eye and right-sided facial numbness.  She apparently has a history of complex migraines and has had very similar symptoms with those episodes.  Per chart review, she has had several emergency department visits for related complaints.  Normal MRI of the brain was done in October of last year.  She also had a negative head CT as recently as June 17.  At that time, symptoms resolved entirely after treatment for migraines.  Patient has been following regularly with her PCP and neurology; they believe her symptoms are either psychosomatic or related to complex migraines.  Patient has no history of CVA, neurological condition such as MS.  She does report significant social stress recently due to a custody zamora.  Denies fevers, chills, chest pain or shortness of breath, focal weakness, other complaints.    Of note, patient wears a continuous glucose monitor and insulin pump for diabetes.  She endorses taking 60 mg of prednisone earlier today based on these symptoms.    Allergies:  Allergies   Allergen Reactions    No Known Allergies      Problem List:    Patient Active Problem List    Diagnosis Date Noted    Hypophosphatemia 05/21/2023     Priority: Medium    Hypokalemia 05/20/2023     Priority: Medium    Anemia, unspecified type 05/20/2023     Priority: Medium    Surgical abdomen 05/19/2023     Priority: Medium    Free intraperitoneal air 05/19/2023     Priority: Medium     Type 1 diabetes mellitus with hyperglycemia (H) 05/19/2023     Priority: Medium    Insulin pump status 05/19/2023     Priority: Medium    ADHD (attention deficit hyperactivity disorder) 05/19/2023     Priority: Medium    Depression with anxiety 05/19/2023     Priority: Medium    SIRS (systemic inflammatory response syndrome) (H) 05/19/2023     Priority: Medium    Diabetic ketoacidosis without coma associated with type 1 diabetes mellitus (H) 05/19/2023     Priority: Medium    Thrombosis of right saphenous vein 05/19/2023     Priority: Medium    CARDIOVASCULAR SCREENING; LDL GOAL LESS THAN 160 04/03/2012     Priority: Medium      Past Medical History:    Past Medical History:   Diagnosis Date    Anxiety     Bronchitis     Depressive disorder     Diabetes (H)     NO ACTIVE PROBLEMS     Ovarian cyst      Past Surgical History:    Past Surgical History:   Procedure Laterality Date    APPENDECTOMY OPEN N/A 5/19/2023    Procedure: Open Appendectomy;  Surgeon: Jesse Blackman MD;  Location[facility]    CYSTECTOMY OVARIAN BENIGN      DILATION AND CURETTAGE      DILATION AND CURETTAGE  2011    GYN SURGERY      HEMORRHOID SURGERY      LAPAROSCOPY DIAGNOSTIC (GENERAL) N/A 5/19/2023    Procedure: LAPAROSCOPY, DIAGNOSTIC;  Surgeon: Jesse Blackman MD;  Location[facility]    LAPAROTOMY EXPLORATORY N/A 5/19/2023    Procedure: LAPAROTOMY;  Surgeon: Jesse Blackman MD;  Location: [facility]     Family History:    Family History   Problem Relation Age of Onset    Cerebrovascular Disease Maternal Grandfather     Breast Cancer Maternal Grandfather     Asthma Sister     Diabetes Sister 14     Social History:  Marital Status:  Single [1]  Social History     Tobacco Use    Smoking status: Every Day     Current packs/day: 0.25     Types: Cigarettes    Smokeless tobacco: Never   Substance Use Topics    Alcohol use: Yes     Comment: 1-2x/month    Drug use: No      Medications:    acetaminophen (TYLENOL) 500 MG  "tablet  amphetamine-dextroamphetamine (ADDERALL) 20 MG tablet  blood glucose (CONTOUR NEXT TEST) test strip  clonazePAM (KLONOPIN) 0.5 MG tablet  Continuous Blood Gluc Sensor (DEXCOM G6 SENSOR) MISC  Continuous Blood Gluc Transmit (DEXCOM G6 TRANSMITTER) MISC  famotidine (PEPCID) 20 MG tablet  fluticasone (FLONASE) 50 MCG/ACT nasal spray  insulin aspart (NOVOLOG VIAL) 100 UNITS/ML vial  Insulin Glargine-yfgn 100 UNIT/ML SOPN  INSULIN PUMP - OUTPATIENT  KETOSTIX test strip  NOVOLOG FLEXPEN 100 UNIT/ML soln  QUEtiapine (SEROQUEL) 50 MG tablet  UBRELVY 100 MG tablet      Review of Systems   All other systems reviewed and are negative.  See HPI.    Physical Exam   BP: 126/82  Pulse: 107  Temp: 97.6  F (36.4  C)  Resp: 22  Height: 167.6 cm (5' 6\")  Weight: 63.5 kg (140 lb)  SpO2: 98 %    Physical Exam  Vitals and nursing note reviewed.   Constitutional:       Appearance: Normal appearance. She is not diaphoretic.      Comments: Anxious.  Otherwise nontoxic and in no distress.   HENT:      Head: Normocephalic and atraumatic.      Comments: No facial tenderness or instability.     Right Ear: Tympanic membrane and ear canal normal.      Left Ear: Tympanic membrane and ear canal normal.      Nose: Nose normal.      Mouth/Throat:      Mouth: Mucous membranes are moist.      Pharynx: No oropharyngeal exudate or posterior oropharyngeal erythema.   Eyes:      General: No scleral icterus.     Extraocular Movements: Extraocular movements intact.      Conjunctiva/sclera: Conjunctivae normal.      Pupils: Pupils are equal, round, and reactive to light.   Neck:      Comments: No bruit.  Completely normal range of motion with no tenderness.  Cardiovascular:      Rate and Rhythm: Normal rate and regular rhythm.      Pulses: Normal pulses.      Heart sounds: Normal heart sounds.   Pulmonary:      Effort: Pulmonary effort is normal. No respiratory distress.      Breath sounds: Normal breath sounds.   Abdominal:      General: Abdomen is " flat.      Tenderness: There is no abdominal tenderness.   Musculoskeletal:         General: No swelling or tenderness. Normal range of motion.      Cervical back: Normal range of motion and neck supple. No rigidity or tenderness.   Skin:     General: Skin is warm.      Capillary Refill: Capillary refill takes less than 2 seconds.      Coloration: Skin is not pale.      Findings: No erythema or rash.   Neurological:      General: No focal deficit present.      Mental Status: She is alert and oriented to person, place, and time.      Cranial Nerves: No cranial nerve deficit.      Sensory: No sensory deficit.      Motor: No weakness.      Coordination: Coordination normal.      Gait: Gait normal.      Comments: Neurological exam is entirely nonfocal.  Cranial nerves intact.  Moving all extremities spontaneously with equal strength, normal finger-nose-finger and gait.  No meningismus.   Psychiatric:      Comments: Anxious.       ED Course        Procedures              Results for orders placed or performed during the hospital encounter of 08/07/24 (from the past 24 hour(s))   CBC with platelets differential    Narrative    The following orders were created for panel order CBC with platelets differential.  Procedure                               Abnormality         Status                     ---------                               -----------         ------                     CBC with platelets and d...[[id number]]  Abnormal            Final result                 Please view results for these tests on the individual orders.   Comprehensive metabolic panel   Result Value Ref Range    Sodium 139 135 - 145 mmol/L    Potassium 4.4 3.4 - 5.3 mmol/L    Carbon Dioxide (CO2) 19 (L) 22 - 29 mmol/L    Anion Gap 16 (H) 7 - 15 mmol/L    Urea Nitrogen 18.3 6.0 - 20.0 mg/dL    Creatinine 0.70 0.51 - 0.95 mg/dL    GFR Estimate >90 >60 mL/min/1.73m2    Calcium 9.6 8.8 - 10.4 mg/dL    Chloride 104 98 - 107 mmol/L    Glucose 276 (H) 70  - 99 mg/dL    Alkaline Phosphatase 97 40 - 150 U/L    AST 25 0 - 45 U/L    ALT 33 0 - 50 U/L    Protein Total 7.7 6.4 - 8.3 g/dL    Albumin 4.3 3.5 - 5.2 g/dL    Bilirubin Total 0.2 <=1.2 mg/dL   CRP inflammation   Result Value Ref Range    CRP Inflammation <3.00 <5.00 mg/L   Erythrocyte sedimentation rate auto   Result Value Ref Range    Erythrocyte Sedimentation Rate 13 0 - 20 mm/hr   HCG qualitative Blood   Result Value Ref Range    hCG Serum Qualitative Negative Negative   Magnesium   Result Value Ref Range    Magnesium 1.9 1.7 - 2.3 mg/dL   Blood gas venous   Result Value Ref Range    pH Venous 7.37 7.32 - 7.43    pCO2 Venous 36 (L) 40 - 50 mm Hg    pO2 Venous 75 (H) 25 - 47 mm Hg    Bicarbonate Venous 21 21 - 28 mmol/L    Base Excess/Deficit Venous -3.7 (L) -3.0 - 3.0 mmol/L    FIO2 21     Oxyhemoglobin Venous 91 (H) 70 - 75 %    O2 Sat, Venous 95.8 (H) 70.0 - 75.0 %    Narrative    In healthy individuals, oxyhemoglobin (O2Hb) and oxygen saturation (SO2) are approximately equal. In the presence of dyshemoglobins, oxyhemoglobin can be considerably lower than oxygen saturation.   CBC with platelets and differential   Result Value Ref Range    WBC Count 9.0 4.0 - 11.0 10e3/uL    RBC Count 4.36 3.80 - 5.20 10e6/uL    Hemoglobin 13.8 11.7 - 15.7 g/dL    Hematocrit 40.5 35.0 - 47.0 %    MCV 93 78 - 100 fL    MCH 31.7 26.5 - 33.0 pg    MCHC 34.1 31.5 - 36.5 g/dL    RDW 13.2 10.0 - 15.0 %    Platelet Count 367 150 - 450 10e3/uL    % Neutrophils 90 %    % Lymphocytes 8 %    % Monocytes 1 %    % Eosinophils 0 %    % Basophils 0 %    % Immature Granulocytes 0 %    NRBCs per 100 WBC 0 <1 /100    Absolute Neutrophils 8.2 1.6 - 8.3 10e3/uL    Absolute Lymphocytes 0.7 (L) 0.8 - 5.3 10e3/uL    Absolute Monocytes 0.1 0.0 - 1.3 10e3/uL    Absolute Eosinophils 0.0 0.0 - 0.7 10e3/uL    Absolute Basophils 0.0 0.0 - 0.2 10e3/uL    Absolute Immature Granulocytes 0.0 <=0.4 10e3/uL    Absolute NRBCs 0.0 10e3/uL   Symptomatic Influenza  A/B, RSV, & SARS-CoV2 PCR (COVID-19) Nose    Specimen: Nose; Swab   Result Value Ref Range    Influenza A PCR Negative Negative    Influenza B PCR Negative Negative    RSV PCR Negative Negative    SARS CoV2 PCR Negative Negative    Narrative    Testing was performed using the Xpert Xpress CoV2/Flu/RSV Assay on the [employer] GeneXpert Instrument. This test should be ordered for the detection of SARS-CoV-2, influenza, and RSV viruses in individuals who meet clinical and/or epidemiological criteria. Test performance is unknown in asymptomatic patients. This test is for in vitro diagnostic use under the FDA EUA for laboratories certified under CLIA to perform high or moderate complexity testing. This test has not been FDA cleared or approved. A negative result does not rule out the presence of PCR inhibitors in the specimen or target RNA in concentration below the limit of detection for the assay. If only one viral target is positive but coinfection with multiple targets is suspected, the sample should be re-tested with another FDA cleared, approved, or authorized test, if coinfection would change clinical management. This test was validated by the [facility]. These laboratories are certified under the Clinical Laboratory Improvement Amendments of 1988 (CLIA-88) as qualified to perform high complexity laboratory testing.       Medications   sodium chloride 0.9% BOLUS 1,000 mL (0 mLs Intravenous Stopped 8/7/24 1706)   ketorolac (TORADOL) injection 15 mg (15 mg Intravenous $Given 8/7/24 1611)   prochlorperazine (COMPAZINE) injection 10 mg (10 mg Intravenous Not Given 8/7/24 1627)   diphenhydrAMINE (BENADRYL) injection 25 mg (25 mg Intravenous Not Given 8/7/24 1626)   HYDROmorphone (PF) (DILAUDID) injection 0.5 mg (0.5 mg Intravenous $Given 8/7/24 1711)       Assessments & Plan (with Medical Decision Making)     I have reviewed the nursing notes.    I have reviewed the findings, diagnosis, plan and need  for follow up with the patient.  Medical Decision Making  Heidi Su is a 39 year old female with history of type 1 diabetes, complex migraines who presents for evaluation of numbness and whole body heaviness sensation.  Borderline tachycardia but otherwise normal vitals.  She does appear anxious but nontoxic.  Neurological exam is entirely nonfocal.  Cranial nerves are intact, she has normal strength and sensation to all extremities, finger-nose-finger and gait are unremarkable as well.  Neck is nontender and she has no meningismus.  Pulses are equal throughout.  The cause of her symptoms is unclear, but I think it is probably related to anxiety/stress and potentially a complex migraine.  Differential could include neurodegenerative processes like MS, but this seems unlikely with a normal MRI less than a year ago.  I highly doubt CVA based on whole body involvement and no lateralizing symptoms/normal exam.  Theoretically she could have some electrolyte abnormalities given diabetes and reported hyperglycemia.  Discussed a variety of evaluation options with the patient and her mother.  Since she has had similar symptoms in the past with the migraine and no focal neurological symptoms, we will start with a migraine cocktail while checking blood work.  We spent a great deal of time discussing the benefits and costs of imaging.  She has had a recent head CT as recently as 2 months ago and with no significant headache or neurological deficits, I do not think repeating this would provide significant benefit.  I still offered to perform the study and patient ultimately declined.    Her labs showed hyperglycemia at 276 and minimal elevation in anion gap.  VBG was unremarkable.  Remainder of electrolytes and inflammatory markers were negative.  Patient felt improved with fluids and a single dose of Dilaudid.  I had initially recommended Compazine/Benadryl but she declined to these medications, as they have made her feel  very sedated previously.  All of these results were discussed with the patient.  Since she feels improved, I still feel as though a migraine and/or anxiety is most likely.  We had a similar conversation regarding benefits of MRI.  Since she has implantable devices, this would likely necessitate transfer to another hospital.  Patient ultimately declined MRI and will instead follow-up outpatient with her PCP and neurologist.  Discussed very strict return precautions.  She agrees to come back immediately for any new or worsening symptoms, particularly any headache, genuine weakness.    Discharge Medication List as of 8/7/2024  5:55 PM        Final diagnoses:   Numbness   Generalized body aches   Hyperglycemia     8/7/2024   [facility] EMERGENCY DEPT       Harrison Faustin MD  08/07/24 [id number]

## 2024-08-07 NOTE — ED TRIAGE NOTES
PT presents with c/o body weakness and whole body pain. PT reports that she woke up today morning and did not feel any sensation from her neck down. PT also reports off and on Blurry vision to right side and right sided facial numbness when she woke up around 0900H today . Denies Chest pain.  Provider notified Stroke Eval called.  .

## 2024-08-07 NOTE — ED NOTES
She has body numbness and tingling and also pain.  She did not want the benedryl or compazine because of they are sedating.  She agreed to try the toradol.

## 2024-08-15 LAB — GLUCOSE BLDC GLUCOMTR-MCNC: 384 MG/DL (ref 70–99)

## 2024-08-21 ENCOUNTER — TELEPHONE (OUTPATIENT)
Dept: NEUROLOGY | Facility: CLINIC | Age: 39
End: 2024-08-21
Payer: COMMERCIAL

## 2024-08-21 NOTE — TELEPHONE ENCOUNTER
I returned call to patient and LM letting her know she can see our general neurologist here at [place] but will have to start over as a New Patient.

## 2024-08-31 ENCOUNTER — HOSPITAL ENCOUNTER (EMERGENCY)
Facility: CLINIC | Age: 39
Discharge: HOME | End: 2024-08-31
Attending: FAMILY MEDICINE | Admitting: FAMILY MEDICINE
Payer: COMMERCIAL

## 2024-08-31 ENCOUNTER — APPOINTMENT (OUTPATIENT)
Dept: GENERAL RADIOLOGY | Facility: CLINIC | Age: 39
End: 2024-08-31
Attending: FAMILY MEDICINE
Payer: COMMERCIAL

## 2024-08-31 VITALS
DIASTOLIC BLOOD PRESSURE: 96 MMHG | SYSTOLIC BLOOD PRESSURE: 140 MMHG | BODY MASS INDEX: 21.69 KG/M2 | HEART RATE: 85 BPM | TEMPERATURE: 98.6 F | RESPIRATION RATE: 16 BRPM | HEIGHT: 66 IN | WEIGHT: 135 LBS | OXYGEN SATURATION: 99 %

## 2024-08-31 DIAGNOSIS — J06.9 VIRAL URI: ICD-10-CM

## 2024-08-31 LAB — SARS-COV-2 RNA RESP QL NAA+PROBE: NEGATIVE

## 2024-08-31 PROCEDURE — 99284 EMERGENCY DEPT VISIT MOD MDM: CPT | Mod: 25 | Performed by: FAMILY MEDICINE

## 2024-08-31 PROCEDURE — 87635 SARS-COV-2 COVID-19 AMP PRB: CPT | Performed by: FAMILY MEDICINE

## 2024-08-31 PROCEDURE — 99283 EMERGENCY DEPT VISIT LOW MDM: CPT | Performed by: FAMILY MEDICINE

## 2024-08-31 PROCEDURE — 71045 X-RAY EXAM CHEST 1 VIEW: CPT

## 2024-08-31 RX ORDER — CODEINE PHOSPHATE AND GUAIFENESIN 10; 100 MG/5ML; MG/5ML
1-2 SOLUTION ORAL EVERY 4 HOURS PRN
Qty: 120 ML | Refills: 0 | Status: SHIPPED | OUTPATIENT
Start: 2024-08-31

## 2024-08-31 ASSESSMENT — ACTIVITIES OF DAILY LIVING (ADL): ADLS_ACUITY_SCORE: 33

## 2024-08-31 NOTE — ED PROVIDER NOTES
History     Chief Complaint   Patient presents with    Cold Symptoms     HPI  Heidi Su is a 39 year old female who presents with concerns of a 1 week history of a cough, chest congestion.  This morning she states that she was coughing up a little bit of blood and had a bloody nose.  She states that she felt very weak and tired and had a hard time even just getting in here today.  Patient denies any recent sick contacts.  Patient does not have a history of asthma but has been on inhalers in the past when she has been diagnosed with bronchitis.  Denies any significant headaches.  She states her throat hurts a little bit when she is coughing.    Allergies:  Allergies   Allergen Reactions    No Known Allergies        Problem List:    Patient Active Problem List    Diagnosis Date Noted    Hypophosphatemia 05/21/2023     Priority: Medium    Hypokalemia 05/20/2023     Priority: Medium    Anemia, unspecified type 05/20/2023     Priority: Medium    Surgical abdomen 05/19/2023     Priority: Medium    Free intraperitoneal air 05/19/2023     Priority: Medium    Type 1 diabetes mellitus with hyperglycemia (H) 05/19/2023     Priority: Medium    Insulin pump status 05/19/2023     Priority: Medium    ADHD (attention deficit hyperactivity disorder) 05/19/2023     Priority: Medium    Depression with anxiety 05/19/2023     Priority: Medium    SIRS (systemic inflammatory response syndrome) (H) 05/19/2023     Priority: Medium    Diabetic ketoacidosis without coma associated with type 1 diabetes mellitus (H) 05/19/2023     Priority: Medium    Thrombosis of right saphenous vein 05/19/2023     Priority: Medium    CARDIOVASCULAR SCREENING; LDL GOAL LESS THAN 160 04/03/2012     Priority: Medium        Past Medical History:    Past Medical History:   Diagnosis Date    Anxiety     Bronchitis     Depressive disorder     Diabetes (H)     NO ACTIVE PROBLEMS     Ovarian cyst        Past Surgical History:    Past Surgical History:  "  Procedure Laterality Date    APPENDECTOMY OPEN N/A 5/19/2023    Procedure: Open Appendectomy;  Surgeon: Jesse Blackman MD;  [facility]    CYSTECTOMY OVARIAN BENIGN      DILATION AND CURETTAGE      DILATION AND CURETTAGE  2011    GYN SURGERY      HEMORRHOID SURGERY      LAPAROSCOPY DIAGNOSTIC (GENERAL) N/A 5/19/2023    Procedure: LAPAROSCOPY, DIAGNOSTIC;  Surgeon: Jesse Blackman MD;  Location[facility]    LAPAROTOMY EXPLORATORY N/A 5/19/2023    Procedure: LAPAROTOMY;  Surgeon: Jesse Blackman MD;  Location: [facility]       Family History:    Family History   Problem Relation Age of Onset    Cerebrovascular Disease Maternal Grandfather     Breast Cancer Maternal Grandfather     Asthma Sister     Diabetes Sister 14       Social History:  Marital Status:  Single [1]  Social History     Tobacco Use    Smoking status: Every Day     Current packs/day: 0.25     Types: Cigarettes    Smokeless tobacco: Never   Substance Use Topics    Alcohol use: Yes     Comment: 1-2x/month    Drug use: No        Medications:    guaiFENesin-codeine (ROBITUSSIN AC) 100-10 MG/5ML solution  acetaminophen (TYLENOL) 500 MG tablet  amphetamine-dextroamphetamine (ADDERALL) 20 MG tablet  blood glucose (CONTOUR NEXT TEST) test strip  clonazePAM (KLONOPIN) 0.5 MG tablet  Continuous Blood Gluc Sensor (DEXCOM G6 SENSOR) MISC  Continuous Blood Gluc Transmit (DEXCOM G6 TRANSMITTER) MISC  famotidine (PEPCID) 20 MG tablet  fluticasone (FLONASE) 50 MCG/ACT nasal spray  insulin aspart (NOVOLOG VIAL) 100 UNITS/ML vial  Insulin Glargine-yfgn 100 UNIT/ML SOPN  INSULIN PUMP - OUTPATIENT  KETOSTIX test strip  NOVOLOG FLEXPEN 100 UNIT/ML soln  QUEtiapine (SEROQUEL) 50 MG tablet  UBRELVY 100 MG tablet          Review of Systems   All other systems reviewed and are negative.      Physical Exam   BP: (!) 140/96  Pulse: 85  Temp: 98.6  F (37  C)  Resp: 15  Height: 167.6 cm (5' 6\")  Weight: 61.2 kg (135 lb)  SpO2: 99 %      Physical Exam  Vitals and nursing note " reviewed.   Constitutional:       General: She is not in acute distress.     Appearance: She is well-developed. She is not diaphoretic.   HENT:      Head: Normocephalic and atraumatic.      Nose: Nose normal. No congestion.      Mouth/Throat:      Pharynx: No oropharyngeal exudate.   Eyes:      Conjunctiva/sclera: Conjunctivae normal.   Cardiovascular:      Rate and Rhythm: Normal rate and regular rhythm.      Heart sounds: Normal heart sounds. No murmur heard.     No friction rub.   Pulmonary:      Effort: Pulmonary effort is normal. No respiratory distress.      Breath sounds: Normal breath sounds. No stridor. No wheezing or rales.   Abdominal:      General: Bowel sounds are normal. There is no distension.      Palpations: Abdomen is soft. There is no mass.      Tenderness: There is no abdominal tenderness. There is no guarding.   Musculoskeletal:         General: No tenderness. Normal range of motion.      Cervical back: Normal range of motion and neck supple.   Skin:     General: Skin is warm and dry.      Capillary Refill: Capillary refill takes less than 2 seconds.      Findings: No erythema.   Neurological:      Mental Status: She is alert and oriented to person, place, and time.   Psychiatric:         Judgment: Judgment normal.         ED Course        Procedures           Results for orders placed or performed during the hospital encounter of 08/31/24 (from the past 24 hour(s))   Symptomatic COVID-19 Virus (Coronavirus) by PCR Nose    Specimen: Nose; Swab   Result Value Ref Range    SARS CoV2 PCR Negative Negative    Narrative    Testing was performed using the Xpert Xpress SARS-CoV-2 Assay on the [employer] Gene-Xpert Instrument Systems. Additional information about this Emergency Use Authorization (EUA) assay can be found via the Lab Guide. This test should be ordered for the detection of SARS-CoV-2 in individuals who meet SARS-CoV-2 clinical and/or epidemiological criteria as well as from individuals  without symptoms or other reasons to suspect COVID-19. Test performance for asymptomatic patients has only been established in anterior nasal swab specimens. This test is for in vitro diagnostic use under the FDA EUA for laboratories certified under CLIA to perform high complexity testing. This test has not been FDA cleared or approved. A negative result does not rule out the presence of PCR inhibitors in the specimen or target RNA concentration below the limit of detection for the assay. The possibility of a false negative should be considered if the patient's recent exposure or clinical presentation suggests COVID-19. This test was validated by the [facility]. This laboratory is certified under the Clinical Laboratory Improvement Amendments (CLIA) as qualified to perform high complexity laboratory testing.     XR Chest Port 1 View    Narrative    EXAM: XR CHEST PORT 1 VIEW  LOCATION: [facility]  DATE: 8/31/2024    INDICATION: cough, congestion  COMPARISON: Chest CT 6/10/2024      Impression    IMPRESSION: Negative chest.       Medications - No data to display    COVID test came back negative, chest x-ray was normal.  I told patient this is likely some other type of viral URI.  Recommend conservative care, patient was told to continue to rest, Tylenol as needed for pain.  Patient will be given a prescription for Robitussin with codeine to help with the cough.  Patient was told to follow-up if there is no improvement in the next 5 days.    Assessments & Plan (with Medical Decision Making)  Viral URI     I have reviewed the nursing notes.    I have reviewed the findings, diagnosis, plan and need for follow up with the patient.      New Prescriptions    GUAIFENESIN-CODEINE (ROBITUSSIN AC) 100-10 MG/5ML SOLUTION    Take 5-10 mLs by mouth every 4 hours as needed for cough.       Final diagnoses:   Viral URI       8/31/2024   [facility]  [facility] EMERGENCY DEPT       Artemio Noonan MD  08/31/24 [id number]

## 2024-08-31 NOTE — ED TRIAGE NOTES
"Cold symptoms for about 1 week and states clear sputum with \"little clots\" in it now.  Chest hurts with coughing, wakes up sweats  Last ibuprofen 1000 today and tylenol about 1200 today      "

## 2024-09-15 ENCOUNTER — HOSPITAL ENCOUNTER (EMERGENCY)
Facility: CLINIC | Age: 39
Discharge: HOME | End: 2024-09-15
Attending: STUDENT IN AN ORGANIZED HEALTH CARE EDUCATION/TRAINING PROGRAM | Admitting: STUDENT IN AN ORGANIZED HEALTH CARE EDUCATION/TRAINING PROGRAM
Payer: COMMERCIAL

## 2024-09-15 VITALS
DIASTOLIC BLOOD PRESSURE: 89 MMHG | RESPIRATION RATE: 18 BRPM | SYSTOLIC BLOOD PRESSURE: 135 MMHG | HEART RATE: 95 BPM | TEMPERATURE: 97.6 F | OXYGEN SATURATION: 100 %

## 2024-09-15 DIAGNOSIS — G43.709 CHRONIC MIGRAINE WITHOUT AURA WITHOUT STATUS MIGRAINOSUS, NOT INTRACTABLE: ICD-10-CM

## 2024-09-15 PROCEDURE — 99284 EMERGENCY DEPT VISIT MOD MDM: CPT | Performed by: STUDENT IN AN ORGANIZED HEALTH CARE EDUCATION/TRAINING PROGRAM

## 2024-09-15 PROCEDURE — 96374 THER/PROPH/DIAG INJ IV PUSH: CPT | Performed by: STUDENT IN AN ORGANIZED HEALTH CARE EDUCATION/TRAINING PROGRAM

## 2024-09-15 PROCEDURE — 99284 EMERGENCY DEPT VISIT MOD MDM: CPT | Mod: 25 | Performed by: STUDENT IN AN ORGANIZED HEALTH CARE EDUCATION/TRAINING PROGRAM

## 2024-09-15 PROCEDURE — 96375 TX/PRO/DX INJ NEW DRUG ADDON: CPT | Performed by: STUDENT IN AN ORGANIZED HEALTH CARE EDUCATION/TRAINING PROGRAM

## 2024-09-15 PROCEDURE — 96361 HYDRATE IV INFUSION ADD-ON: CPT | Performed by: STUDENT IN AN ORGANIZED HEALTH CARE EDUCATION/TRAINING PROGRAM

## 2024-09-15 PROCEDURE — 250N000011 HC RX IP 250 OP 636: Performed by: STUDENT IN AN ORGANIZED HEALTH CARE EDUCATION/TRAINING PROGRAM

## 2024-09-15 PROCEDURE — 250N000013 HC RX MED GY IP 250 OP 250 PS 637: Performed by: STUDENT IN AN ORGANIZED HEALTH CARE EDUCATION/TRAINING PROGRAM

## 2024-09-15 PROCEDURE — 258N000003 HC RX IP 258 OP 636: Performed by: STUDENT IN AN ORGANIZED HEALTH CARE EDUCATION/TRAINING PROGRAM

## 2024-09-15 RX ORDER — OXYCODONE HYDROCHLORIDE 5 MG/1
5 TABLET ORAL ONCE
Status: COMPLETED | OUTPATIENT
Start: 2024-09-15 | End: 2024-09-15

## 2024-09-15 RX ORDER — DIPHENHYDRAMINE HYDROCHLORIDE 50 MG/ML
25 INJECTION INTRAMUSCULAR; INTRAVENOUS ONCE
Status: DISCONTINUED | OUTPATIENT
Start: 2024-09-15 | End: 2024-09-15

## 2024-09-15 RX ORDER — PROCHLORPERAZINE MALEATE 10 MG
10 TABLET ORAL EVERY 6 HOURS PRN
Qty: 4 TABLET | Refills: 0 | Status: SHIPPED | OUTPATIENT
Start: 2024-09-15

## 2024-09-15 RX ORDER — KETOROLAC TROMETHAMINE 30 MG/ML
30 INJECTION, SOLUTION INTRAMUSCULAR; INTRAVENOUS ONCE
Status: COMPLETED | OUTPATIENT
Start: 2024-09-15 | End: 2024-09-15

## 2024-09-15 RX ORDER — LORAZEPAM 1 MG/1
1 TABLET ORAL EVERY 6 HOURS PRN
Qty: 4 TABLET | Refills: 0 | Status: SHIPPED | OUTPATIENT
Start: 2024-09-15

## 2024-09-15 RX ORDER — LORAZEPAM 1 MG/1
1 TABLET ORAL ONCE
Status: COMPLETED | OUTPATIENT
Start: 2024-09-15 | End: 2024-09-15

## 2024-09-15 RX ADMIN — PROCHLORPERAZINE EDISYLATE 5 MG: 5 INJECTION INTRAMUSCULAR; INTRAVENOUS at 19:58

## 2024-09-15 RX ADMIN — SODIUM CHLORIDE 1000 ML: 9 INJECTION, SOLUTION INTRAVENOUS at 19:47

## 2024-09-15 RX ADMIN — OXYCODONE HYDROCHLORIDE 5 MG: 5 TABLET ORAL at 19:56

## 2024-09-15 RX ADMIN — LORAZEPAM 1 MG: 1 TABLET ORAL at 19:57

## 2024-09-15 RX ADMIN — KETOROLAC TROMETHAMINE 30 MG: 30 INJECTION, SOLUTION INTRAMUSCULAR at 19:58

## 2024-09-15 ASSESSMENT — ENCOUNTER SYMPTOMS: HEADACHES: 1

## 2024-09-15 ASSESSMENT — COLUMBIA-SUICIDE SEVERITY RATING SCALE - C-SSRS
1. IN THE PAST MONTH, HAVE YOU WISHED YOU WERE DEAD OR WISHED YOU COULD GO TO SLEEP AND NOT WAKE UP?: NO
2. HAVE YOU ACTUALLY HAD ANY THOUGHTS OF KILLING YOURSELF IN THE PAST MONTH?: NO
6. HAVE YOU EVER DONE ANYTHING, STARTED TO DO ANYTHING, OR PREPARED TO DO ANYTHING TO END YOUR LIFE?: NO

## 2024-09-15 ASSESSMENT — ACTIVITIES OF DAILY LIVING (ADL): ADLS_ACUITY_SCORE: 35

## 2024-09-16 NOTE — ED TRIAGE NOTES
Headache/migraine onset yesterday into today, 10/10 right now.      Triage Assessment (Adult)       Row Name 09/15/24 1931          Triage Assessment    Airway WDL WDL        Respiratory WDL    Respiratory WDL WDL        Cardiac WDL    Cardiac WDL WDL

## 2024-09-16 NOTE — DISCHARGE INSTRUCTIONS
You are seen for migraine today.  Please follow-up with your neurology team for follow-up appointments.  We provided you with Ativan and Compazine to help with your migraine as an abortive attempt.

## 2024-09-16 NOTE — ED PROVIDER NOTES
History     Chief Complaint   Patient presents with    Headache     HPI  Heidi Su is a 39 year old female who presents with headache since yesterday morning.  She has a history of type 1 diabetes.  She notes that this has been fine.  She had a recent diagnosis of viral URI approximately 2 weeks ago.  She notes that she actually feels better from this however the headache started in the morning/night yesterday and has progressively worsened to today.  She has complex migraines which results and vision changes as well as numbness and tingling and intermittent paralysis of her right side.  Has not had any head trauma or new medications.  She otherwise suffers from anemia history of some electrolyte abnormalities and ADHD.  She does not have any abortive medication aside from Zofran and various other agents that she has been tried on various migraine prophylactic medications without any success in the past.    Allergies:  Allergies   Allergen Reactions    No Known Allergies        Problem List:    Patient Active Problem List    Diagnosis Date Noted    Hypophosphatemia 05/21/2023     Priority: Medium    Hypokalemia 05/20/2023     Priority: Medium    Anemia, unspecified type 05/20/2023     Priority: Medium    Surgical abdomen 05/19/2023     Priority: Medium    Free intraperitoneal air 05/19/2023     Priority: Medium    Type 1 diabetes mellitus with hyperglycemia (H) 05/19/2023     Priority: Medium    Insulin pump status 05/19/2023     Priority: Medium    ADHD (attention deficit hyperactivity disorder) 05/19/2023     Priority: Medium    Depression with anxiety 05/19/2023     Priority: Medium    SIRS (systemic inflammatory response syndrome) (H) 05/19/2023     Priority: Medium    Diabetic ketoacidosis without coma associated with type 1 diabetes mellitus (H) 05/19/2023     Priority: Medium    Thrombosis of right saphenous vein 05/19/2023     Priority: Medium    CARDIOVASCULAR SCREENING; LDL GOAL LESS THAN 160  04/03/2012     Priority: Medium        Past Medical History:    Past Medical History:   Diagnosis Date    Anxiety     Bronchitis     Depressive disorder     Diabetes (H)     NO ACTIVE PROBLEMS     Ovarian cyst        Past Surgical History:    Past Surgical History:   Procedure Laterality Date    APPENDECTOMY OPEN N/A 5/19/2023    Procedure: Open Appendectomy;  Surgeon: Jesse Blackman MD;  Location: [facility]    CYSTECTOMY OVARIAN BENIGN      DILATION AND CURETTAGE      DILATION AND CURETTAGE  2011    GYN SURGERY      HEMORRHOID SURGERY      LAPAROSCOPY DIAGNOSTIC (GENERAL) N/A 5/19/2023    Procedure: LAPAROSCOPY, DIAGNOSTIC;  Surgeon: Jesse Blackman MD;  Location: [facility]    LAPAROTOMY EXPLORATORY N/A 5/19/2023    Procedure: LAPAROTOMY;  Surgeon: Jesse Blackman MD;  Location: [facility]       Family History:    Family History   Problem Relation Age of Onset    Cerebrovascular Disease Maternal Grandfather     Breast Cancer Maternal Grandfather     Asthma Sister     Diabetes Sister 14       Social History:  Marital Status:  Single [1]  Social History     Tobacco Use    Smoking status: Every Day     Current packs/day: 0.25     Types: Cigarettes    Smokeless tobacco: Never   Substance Use Topics    Alcohol use: Yes     Comment: 1-2x/month    Drug use: No        Medications:    LORazepam (ATIVAN) 1 MG tablet  prochlorperazine (COMPAZINE) 10 MG tablet  acetaminophen (TYLENOL) 500 MG tablet  amphetamine-dextroamphetamine (ADDERALL) 20 MG tablet  blood glucose (CONTOUR NEXT TEST) test strip  clonazePAM (KLONOPIN) 0.5 MG tablet  Continuous Blood Gluc Sensor (DEXCOM G6 SENSOR) MISC  Continuous Blood Gluc Transmit (DEXCOM G6 TRANSMITTER) MISC  famotidine (PEPCID) 20 MG tablet  fluticasone (FLONASE) 50 MCG/ACT nasal spray  guaiFENesin-codeine (ROBITUSSIN AC) 100-10 MG/5ML solution  insulin aspart (NOVOLOG VIAL) 100 UNITS/ML vial  Insulin Glargine-yfgn 100 UNIT/ML SOPN  INSULIN PUMP - OUTPATIENT  KETOSTIX test strip  NOVOLOG  FLEXPEN 100 UNIT/ML soln  QUEtiapine (SEROQUEL) 50 MG tablet  UBRELVY 100 MG tablet          Review of Systems   Neurological:  Positive for headaches.   All other systems reviewed and are negative.      Physical Exam   BP: 135/89  Pulse: 95  Temp: 97.6  F (36.4  C)  Resp: 18  SpO2: 100 %      Physical Exam  Nursing note reviewed.   Constitutional:       General: She is not in acute distress.     Appearance: Normal appearance. She is not diaphoretic.   HENT:      Head: Normocephalic and atraumatic.      Nose: Nose normal.      Mouth/Throat:      Mouth: Mucous membranes are moist.   Eyes:      General: No scleral icterus.     Extraocular Movements: Extraocular movements intact.      Conjunctiva/sclera: Conjunctivae normal.      Pupils: Pupils are equal, round, and reactive to light.   Cardiovascular:      Rate and Rhythm: Normal rate.      Heart sounds: Normal heart sounds.   Pulmonary:      Effort: Pulmonary effort is normal. No respiratory distress.      Breath sounds: Normal breath sounds.   Abdominal:      General: Abdomen is flat. Bowel sounds are normal.      Palpations: Abdomen is soft.   Musculoskeletal:      Cervical back: Neck supple.   Skin:     General: Skin is warm.      Capillary Refill: Capillary refill takes less than 2 seconds.      Findings: No rash.   Neurological:      General: No focal deficit present.      Mental Status: She is alert and oriented to person, place, and time.      Comments: Patient explains that she has some tingling to her right arm and right leg.  Strength is intact.  Patient is able to ambulate in without difficulty.  Patient is intact as well.  No cranial nerve deficits notified.         ED Course        Procedures                No results found for this or any previous visit (from the past 24 hour(s)).    Medications   sodium chloride 0.9% BOLUS 1,000 mL (0 mLs Intravenous Stopped 9/15/24 2047)   ketorolac (TORADOL) injection 30 mg (30 mg Intravenous $Given 9/15/24 1958)    prochlorperazine (COMPAZINE) injection 5 mg (5 mg Intravenous $Given 9/15/24 1958)   LORazepam (ATIVAN) tablet 1 mg (1 mg Oral $Given 9/15/24 1957)   oxyCODONE (ROXICODONE) tablet 5 mg (5 mg Oral $Given 9/15/24 1956)       Assessments & Plan (with Medical Decision Making)     I have reviewed the nursing notes.    I have reviewed the findings, diagnosis, plan and need for follow up with the patient.      Medical Decision Making  Pleasant 39-year-old female presenting with complex migraine started yesterday and has been worsening without control.  She has got right facial numbness and tingling with intermittent right eye discomfort and right sided arm and leg tingling which she notes has been at her baseline from the past.  We discussed that the symptoms are often considered concerning for strokelike symptoms/TIA and she notes that this is the exact same symptoms she gets when she gets her migraines go too long.  We discussed imaging and she would like to have this not completed due to the extent of her migraine history and knowing that the symptoms are consistent with her previous.  With no odd sudden onset we will hold imaging at this time.  Chart review was completed and patient's been seen for this in the past.      Additionally, I reviewed neurology note from November 2023. From the neurologist:    Multiple ER visits in October 2023 for double vision, facial numbness, headache, right sided weakness with accompanying headache. Patient states in early October she was at work and her boss asked if her back was hurting, patient stated no and asked why he had asked. Her boss stated she was swinging her right leg outward when walking. At her child's sporting event, parents noticed this as well, and EMT parent encouraged her to go to the ER. Patient decided to go home as she thought this was just her sciatica flaring. She woke the next morning and her entire right side was numb and weak, she did have facial droop. She  also had a headache. Symptoms worsened over time. These symptoms have now come and gone since that week. Sensation of significant pressure behind right eye. Can also lose vision in her right eye for minutes to hours. When she looks to the right she has double vision, and also can have difficulty looking up when symptoms are bad. Typically notices the most when she wakes up.    Her physical exam is otherwise consistent with her presentation with no signs of cardiopulmonary maladies significant cranial nerve deficits or abdominal pathology.    Patient restarted with IV fluids Benadryl Compazine and Toradol.  Patient refusing Toradol and would like Dilaudid.  Discussed that this is not the management of migraine therapy to the [facility] medicine and emergency medicine and that we can meet custodial with Compazine Benadryl and again patient refused Benadryl therefore transitions to a 1 mg dose of Ativan with the Compazine to help with side effects as well as patient's nausea with IV fluids.      After medication provided patient notes that she felt better.  Her blood pressure was 135/89, temperature 97.6 and a pulse of 95 and oxygen saturation 100%.  At this point patient feels much better elected discharge home.  We discussed outpatient follow-up and return precautions and patient was discharged home    Discharge Medication List as of 9/15/2024  8:47 PM        START taking these medications    Details   LORazepam (ATIVAN) 1 MG tablet Take 1 tablet (1 mg) by mouth every 6 hours as needed for muscle spasms (mirgaine)., Disp-4 tablet, R-0, E-Prescribe      prochlorperazine (COMPAZINE) 10 MG tablet Take 1 tablet (10 mg) by mouth every 6 hours as needed for nausea or vomiting., Disp-4 tablet, R-0, E-Prescribe             Final diagnoses:   Chronic migraine without aura without status migrainosus, not intractable       9/15/2024   [facility] EMERGENCY DEPT       Braeden Jimenez,  MD  09/15/24 8526

## 2024-11-17 ENCOUNTER — HEALTH MAINTENANCE LETTER (OUTPATIENT)
Age: 39
End: 2024-11-17

## 2025-01-23 ENCOUNTER — HOSPITAL ENCOUNTER (EMERGENCY)
Facility: CLINIC | Age: 40
Discharge: HOME | End: 2025-01-23
Attending: FAMILY MEDICINE
Payer: COMMERCIAL

## 2025-01-23 VITALS
HEART RATE: 100 BPM | HEIGHT: 66 IN | TEMPERATURE: 98.2 F | OXYGEN SATURATION: 97 % | WEIGHT: 142 LBS | DIASTOLIC BLOOD PRESSURE: 93 MMHG | RESPIRATION RATE: 20 BRPM | SYSTOLIC BLOOD PRESSURE: 132 MMHG | BODY MASS INDEX: 22.82 KG/M2

## 2025-01-23 DIAGNOSIS — M62.830 BACK MUSCLE SPASM: ICD-10-CM

## 2025-01-23 DIAGNOSIS — R10.9 FLANK PAIN: ICD-10-CM

## 2025-01-23 LAB
ALBUMIN UR-MCNC: 10 MG/DL
APPEARANCE UR: CLEAR
BILIRUB UR QL STRIP: NEGATIVE
COLOR UR AUTO: ABNORMAL
FLUAV RNA SPEC QL NAA+PROBE: NEGATIVE
FLUBV RNA RESP QL NAA+PROBE: NEGATIVE
GLUCOSE UR STRIP-MCNC: 70 MG/DL
HGB UR QL STRIP: NEGATIVE
KETONES UR STRIP-MCNC: NEGATIVE MG/DL
LEUKOCYTE ESTERASE UR QL STRIP: NEGATIVE
MUCOUS THREADS #/AREA URNS LPF: PRESENT /LPF
NITRATE UR QL: NEGATIVE
PH UR STRIP: 6.5 [PH] (ref 5–7)
RBC URINE: <1 /HPF
RSV RNA SPEC NAA+PROBE: NEGATIVE
SARS-COV-2 RNA RESP QL NAA+PROBE: NEGATIVE
SP GR UR STRIP: 1.03 (ref 1–1.03)
SQUAMOUS EPITHELIAL: 3 /HPF
UROBILINOGEN UR STRIP-MCNC: 2 MG/DL
WBC URINE: 1 /HPF

## 2025-01-23 PROCEDURE — 250N000013 HC RX MED GY IP 250 OP 250 PS 637: Performed by: FAMILY MEDICINE

## 2025-01-23 PROCEDURE — 81001 URINALYSIS AUTO W/SCOPE: CPT | Performed by: FAMILY MEDICINE

## 2025-01-23 PROCEDURE — 99283 EMERGENCY DEPT VISIT LOW MDM: CPT | Performed by: FAMILY MEDICINE

## 2025-01-23 PROCEDURE — 87637 SARSCOV2&INF A&B&RSV AMP PRB: CPT | Performed by: FAMILY MEDICINE

## 2025-01-23 RX ORDER — LIDOCAINE 4 G/G
1 PATCH TOPICAL ONCE
Status: DISCONTINUED | OUTPATIENT
Start: 2025-01-23 | End: 2025-01-23 | Stop reason: HOSPADM

## 2025-01-23 RX ADMIN — LIDOCAINE 1 PATCH: 4 PATCH TOPICAL at 05:08

## 2025-01-23 ASSESSMENT — ACTIVITIES OF DAILY LIVING (ADL)
ADLS_ACUITY_SCORE: 49
ADLS_ACUITY_SCORE: 49

## 2025-01-23 ASSESSMENT — ENCOUNTER SYMPTOMS
FEVER: 0
MYALGIAS: 1
DYSURIA: 0
FLANK PAIN: 1

## 2025-01-23 NOTE — DISCHARGE INSTRUCTIONS
Please read and follow the handout(s) instructions. Return, if needed, for increased or worsening symptoms and as directed by the handout(s).     If not using the lidocaine patch to the area for pain control you can use ice for 10 to 15 minutes followed by gentle massage or stretching to the muscle of the back.  Return should you develop any signs of blood in the urine or painful urination or signs of a worsening fever.

## 2025-01-23 NOTE — ED TRIAGE NOTES
"Started a couple days ago with general body aches and tonight \"kidney pain on the right side\"      Triage Assessment (Adult)       Row Name 01/23/25 0343          Triage Assessment    Airway WDL WDL        Respiratory WDL    Respiratory WDL WDL        Skin Circulation/Temperature WDL    Skin Circulation/Temperature WDL WDL        Cardiac WDL    Cardiac WDL WDL        Peripheral/Neurovascular WDL    Peripheral Neurovascular WDL WDL        Cognitive/Neuro/Behavioral WDL    Cognitive/Neuro/Behavioral WDL WDL                     "

## 2025-01-24 NOTE — ED PROVIDER NOTES
History     Chief Complaint   Patient presents with    Flank Pain     HPI  Heidi Su is a 39 year old female who presents to the emergency room this morning secondary concerns of right-sided flank area pain and tenderness.  Pain is worse with change in position and movement.  She states that she has had some generalized bodyaches over the last couple days as well.  She denies pain or burning with urination.  She has not noticed any grossly bloody urinations.  She states that she has had a hysterectomy with removal of both ovaries and also has had an appendectomy.  She denies any nausea or vomiting symptoms.  Denied any fall or injury that might of caused the pain to the area.    Allergies:  Allergies   Allergen Reactions    No Known Allergies        Problem List:    Patient Active Problem List    Diagnosis Date Noted    Hypophosphatemia 05/21/2023     Priority: Medium    Hypokalemia 05/20/2023     Priority: Medium    Anemia, unspecified type 05/20/2023     Priority: Medium    Surgical abdomen 05/19/2023     Priority: Medium    Free intraperitoneal air 05/19/2023     Priority: Medium    Type 1 diabetes mellitus with hyperglycemia (H) 05/19/2023     Priority: Medium    Insulin pump status 05/19/2023     Priority: Medium    ADHD (attention deficit hyperactivity disorder) 05/19/2023     Priority: Medium    Depression with anxiety 05/19/2023     Priority: Medium    SIRS (systemic inflammatory response syndrome) (H) 05/19/2023     Priority: Medium    Diabetic ketoacidosis without coma associated with type 1 diabetes mellitus (H) 05/19/2023     Priority: Medium    Thrombosis of right saphenous vein 05/19/2023     Priority: Medium    CARDIOVASCULAR SCREENING; LDL GOAL LESS THAN 160 04/03/2012     Priority: Medium        Past Medical History:    Past Medical History:   Diagnosis Date    Anxiety     Bronchitis     Depressive disorder     Diabetes (H)     NO ACTIVE PROBLEMS     Ovarian cyst        Past Surgical  History:    Past Surgical History:   Procedure Laterality Date    APPENDECTOMY OPEN N/A 5/19/2023    Procedure: Open Appendectomy;  Surgeon: Jesse Blackman MD;  Location: [facility]    CYSTECTOMY OVARIAN BENIGN      DILATION AND CURETTAGE      DILATION AND CURETTAGE  2011    GYN SURGERY      HEMORRHOID SURGERY      LAPAROSCOPY DIAGNOSTIC (GENERAL) N/A 5/19/2023    Procedure: LAPAROSCOPY, DIAGNOSTIC;  Surgeon: Jesse Blackman MD;  Location: [facility]    LAPAROTOMY EXPLORATORY N/A 5/19/2023    Procedure: LAPAROTOMY;  Surgeon: Jesse Blackman MD;  Location: [facility]       Family History:    Family History   Problem Relation Age of Onset    Cerebrovascular Disease Maternal Grandfather     Breast Cancer Maternal Grandfather     Asthma Sister     Diabetes Sister 14       Social History:  Marital Status:  Single [1]  Social History     Tobacco Use    Smoking status: Every Day     Current packs/day: 0.25     Types: Cigarettes    Smokeless tobacco: Never   Substance Use Topics    Alcohol use: Yes     Comment: 1-2x/month    Drug use: No        Medications:    acetaminophen (TYLENOL) 500 MG tablet  amphetamine-dextroamphetamine (ADDERALL) 20 MG tablet  famotidine (PEPCID) 20 MG tablet  fluticasone (FLONASE) 50 MCG/ACT nasal spray  insulin aspart (NOVOLOG VIAL) 100 UNITS/ML vial  Insulin Glargine-yfgn 100 UNIT/ML SOPN  INSULIN PUMP - OUTPATIENT  LORazepam (ATIVAN) 1 MG tablet  QUEtiapine (SEROQUEL) 50 MG tablet  UBRELVY 100 MG tablet  blood glucose (CONTOUR NEXT TEST) test strip  Continuous Blood Gluc Sensor (DEXCOM G6 SENSOR) MISC  Continuous Blood Gluc Transmit (DEXCOM G6 TRANSMITTER) MISC  guaiFENesin-codeine (ROBITUSSIN AC) 100-10 MG/5ML solution  KETOSTIX test strip  NOVOLOG FLEXPEN 100 UNIT/ML soln          Review of Systems   Constitutional:  Negative for fever.   Genitourinary:  Positive for flank pain. Negative for dysuria.   Musculoskeletal:  Positive for myalgias.   All other systems reviewed and are  "negative.      Physical Exam   BP: (!) 132/93  Pulse: 100  Temp: 98.2  F (36.8  C)  Resp: 20  Height: 167.6 cm (5' 6\")  Weight: 64.4 kg (142 lb)  SpO2: 100 %      Physical Exam  Vitals and nursing note reviewed.   Constitutional:       General: She is not in acute distress.  HENT:      Head: Normocephalic and atraumatic.   Eyes:      General: No scleral icterus.     Conjunctiva/sclera: Conjunctivae normal.   Cardiovascular:      Rate and Rhythm: Normal rate.   Pulmonary:      Effort: Pulmonary effort is normal. No respiratory distress.   Abdominal:      Tenderness: There is no guarding or rebound.      Comments: Palpation over the area the right flank area reveals tenderness to the quadratus lumborum musculature with just mild pressure on the musculature triggering the pain.  Pain not consistent with renal colic.  He is also noted to have spasm to the quadratus lumborum and paralumbar musculature on the right.  No skin rash noted or signs of bruising or ecchymosis seen.  No erythema noted.   Musculoskeletal:      Cervical back: Neck supple.   Neurological:      Mental Status: She is alert.         ED Course        Procedures              Critical Care time:  none              Results for orders placed or performed during the hospital encounter of 01/23/25 (from the past 24 hours)   UA with Microscopic reflex to Culture    Specimen: Urine, Midstream   Result Value Ref Range    Color Urine Light Yellow Colorless, Straw, Light Yellow, Yellow    Appearance Urine Clear Clear    Glucose Urine 70 (A) Negative mg/dL    Bilirubin Urine Negative Negative    Ketones Urine Negative Negative mg/dL    Specific Gravity Urine 1.032 1.003 - 1.035    Blood Urine Negative Negative    pH Urine 6.5 5.0 - 7.0    Protein Albumin Urine 10 (A) Negative mg/dL    Urobilinogen Urine 2.0 Normal, 2.0 mg/dL    Nitrite Urine Negative Negative    Leukocyte Esterase Urine Negative Negative    Mucus Urine Present (A) None Seen /LPF    RBC Urine <1 " <=2 /HPF    WBC Urine 1 <=5 /HPF    Squamous Epithelials Urine 3 (H) <=1 /HPF    Narrative    Urine Culture not indicated   Influenza A/B, RSV and SARS-CoV2 PCR (COVID-19) Nose    Specimen: Nose; Swab   Result Value Ref Range    Influenza A PCR Negative Negative    Influenza B PCR Negative Negative    RSV PCR Negative Negative    SARS CoV2 PCR Negative Negative    Narrative    Testing was performed using the Xpert Xpress CoV2/Flu/RSV Assay on the [employer] GeneXpert Instrument. This test should be ordered for the detection of SARS-CoV2, influenza, and RSV viruses in individuals with signs and symptoms of respiratory tract infection. This test is for in vitro diagnostic use under the [place] FDA for laboratories certified under CLIA to perform high or moderate complexity testing. This test has been [place] FDA cleared. A negative result does not rule out the presence of PCR inhibitors in the specimen or target RNA in concentration below the limit of detection for the assay. If only one viral target is positive but coinfection with multiple targets is suspected, the sample should be re-tested with another FDA cleared, approved, or authorized test, if coninfection would change clinical management. This test was validated by the [facility]. These laboratories are certified under the Clinical Laboratory Improvement Amendments of 1988 (CLIA-88) as qualified to perfom high complexity laboratory testing.       Medications - No data to display    Assessments & Plan (with Medical Decision Making)  39-year-old female to the ER secondary concerns of right-sided flank area pain has been present for couple days associated with some generalized bodyaches as well.  Exam findings consistent with spasm to the quadratus lumborum muscle on the right.  Urinalysis unremarkable for suggestion of any infection or hematuria.  Screening influenza testing done given the body aches but this proved negative.  Patient instructed in the  use of Lidoderm patches over the area she was also given information regarding massage and stretching that can be done.  Encourage follow-up in our clinic if not improved over the next 7 to 10 days.  To return to the ER for increase or worsening symptoms such as worsening pain, blood in the urine, or high fever.  She was discharged in stable condition.     I have reviewed the nursing notes.    I have reviewed the findings, diagnosis, plan and need for follow up with the patient.         Discharge Medication List as of 1/23/2025  5:04 AM             I verbally discussed the findings of the evaluation today in the ER. I have verbally discussed with Heidi the suggested treatment(s) as described in the discharge instructions and handouts.    I have verbally suggested she follow-up in her clinic or return to the ER for increased symptoms. See the follow-up recommendations documented  in the after visit summary in this visit's EPIC chart.      Disclaimer: This note consists of words and symbols derived from keyboarding and dictation using voice recognition software.  As a result, there may be errors that have gone undetected.  Please consider this when interpreting information found in this note.      Final diagnoses:   Flank pain   Back muscle spasm       1/23/2025   [facility] EMERGENCY DEPT       Richard Levine,   01/23/25 1939

## 2025-03-08 ENCOUNTER — HEALTH MAINTENANCE LETTER (OUTPATIENT)
Age: 40
End: 2025-03-08

## 2025-05-19 ENCOUNTER — APPOINTMENT (OUTPATIENT)
Dept: CT IMAGING | Facility: CLINIC | Age: 40
End: 2025-05-19
Attending: EMERGENCY MEDICINE
Payer: COMMERCIAL

## 2025-05-19 ENCOUNTER — HOSPITAL ENCOUNTER (EMERGENCY)
Facility: CLINIC | Age: 40
Discharge: HOME | End: 2025-05-19
Attending: EMERGENCY MEDICINE | Admitting: EMERGENCY MEDICINE
Payer: COMMERCIAL

## 2025-05-19 VITALS
HEIGHT: 66 IN | WEIGHT: 150 LBS | BODY MASS INDEX: 24.11 KG/M2 | DIASTOLIC BLOOD PRESSURE: 93 MMHG | TEMPERATURE: 97.7 F | OXYGEN SATURATION: 97 % | SYSTOLIC BLOOD PRESSURE: 143 MMHG | HEART RATE: 86 BPM | RESPIRATION RATE: 18 BRPM

## 2025-05-19 DIAGNOSIS — M54.9 ACUTE UPPER BACK PAIN: ICD-10-CM

## 2025-05-19 PROCEDURE — 99284 EMERGENCY DEPT VISIT MOD MDM: CPT | Mod: 25 | Performed by: EMERGENCY MEDICINE

## 2025-05-19 PROCEDURE — 250N000013 HC RX MED GY IP 250 OP 250 PS 637: Performed by: EMERGENCY MEDICINE

## 2025-05-19 PROCEDURE — 99283 EMERGENCY DEPT VISIT LOW MDM: CPT | Performed by: EMERGENCY MEDICINE

## 2025-05-19 PROCEDURE — 72128 CT CHEST SPINE W/O DYE: CPT

## 2025-05-19 RX ORDER — CYCLOBENZAPRINE HCL 10 MG
5-10 TABLET ORAL 3 TIMES DAILY PRN
Qty: 30 TABLET | Refills: 0 | Status: SHIPPED | OUTPATIENT
Start: 2025-05-19

## 2025-05-19 RX ORDER — HYDROCODONE BITARTRATE AND ACETAMINOPHEN 5; 325 MG/1; MG/1
1 TABLET ORAL EVERY 6 HOURS PRN
Qty: 12 TABLET | Refills: 0 | Status: SHIPPED | OUTPATIENT
Start: 2025-05-19 | End: 2025-05-22

## 2025-05-19 RX ORDER — LIDOCAINE 4 G/G
2 PATCH TOPICAL ONCE
Status: DISCONTINUED | OUTPATIENT
Start: 2025-05-19 | End: 2025-05-19 | Stop reason: HOSPADM

## 2025-05-19 RX ADMIN — LIDOCAINE 2 PATCH: 4 PATCH TOPICAL at 09:48

## 2025-05-19 ASSESSMENT — ACTIVITIES OF DAILY LIVING (ADL)
ADLS_ACUITY_SCORE: 46
ADLS_ACUITY_SCORE: 46

## 2025-05-19 NOTE — DISCHARGE INSTRUCTIONS
Your scan was normal.      Rest, ice or heat to the painful area.    Continue Tylenol over-the-counter if needed for pain.    You can use Biofreeze or lidocaine patches over the area of pain.  Do not place heat over the lidocaine.    Flexeril if needed for muscle spasm.  No driving while on this medication.    Norco if needed for severe pain.  No driving while on this medication either.  It can cause constipation so take stool softeners if needed.    I recommend considering massage or see your primary care provider for physical therapy referral if you continue to have symptoms.    Return for significant worsening, changes or concerns.    I hope that you improve very quickly!!

## 2025-05-19 NOTE — ED TRIAGE NOTES
Patient reports Saturday she felt a pop in the middle of her back when stretching up. Took prednisone Saturday.

## 2025-05-20 NOTE — ED PROVIDER NOTES
"  History     Chief Complaint   Patient presents with    Back Pain     HPI  History per patient, medical records    This is a 39-year-old female, history of type 1 diabetes with insulin pump, chronic low back pain, other history as below presenting with upper back pain.  Patient was reaching up and stretching on Saturday morning, 2 days ago.  She felt a \"pop\" and since then has had pain in her mid upper back.  Pain increases with certain movements and at times will take her breath away.  She has had difficulty sleeping.  She has prednisone available for low back pain issues and did take a dose on Saturday which helped.  However, she noted her blood sugars were quite high, up to 300, and did not want to go into DKA so she has not continued any prednisone dosing.  No fevers or chills.  No cough.  She has tried Tylenol and heat also.  No history of upper back pain or surgeries.  She has not been under chiropractic care.    Allergies:  Allergies   Allergen Reactions    No Known Allergies        Problem List:    Patient Active Problem List    Diagnosis Date Noted    Hypophosphatemia 05/21/2023     Priority: Medium    Hypokalemia 05/20/2023     Priority: Medium    Anemia, unspecified type 05/20/2023     Priority: Medium    Surgical abdomen 05/19/2023     Priority: Medium    Free intraperitoneal air 05/19/2023     Priority: Medium    Type 1 diabetes mellitus with hyperglycemia (H) 05/19/2023     Priority: Medium    Insulin pump status 05/19/2023     Priority: Medium    ADHD (attention deficit hyperactivity disorder) 05/19/2023     Priority: Medium    Depression with anxiety 05/19/2023     Priority: Medium    SIRS (systemic inflammatory response syndrome) (H) 05/19/2023     Priority: Medium    Diabetic ketoacidosis without coma associated with type 1 diabetes mellitus (H) 05/19/2023     Priority: Medium    Thrombosis of right saphenous vein 05/19/2023     Priority: Medium    CARDIOVASCULAR SCREENING; LDL GOAL LESS THAN 160 " 04/03/2012     Priority: Medium        Past Medical History:    Past Medical History:   Diagnosis Date    Anxiety     Bronchitis     Depressive disorder     Diabetes (H)     NO ACTIVE PROBLEMS     Ovarian cyst        Past Surgical History:    Past Surgical History:   Procedure Laterality Date    APPENDECTOMY OPEN N/A 5/19/2023    Procedure: Open Appendectomy;  Surgeon: Jesse Blackman MD;  Location: [facility]    CYSTECTOMY OVARIAN BENIGN      DILATION AND CURETTAGE      DILATION AND CURETTAGE  2011    GYN SURGERY      HEMORRHOID SURGERY      LAPAROSCOPY DIAGNOSTIC (GENERAL) N/A 5/19/2023    Procedure: LAPAROSCOPY, DIAGNOSTIC;  Surgeon: Jesse Blackman MD;  Location: [facility]    LAPAROTOMY EXPLORATORY N/A 5/19/2023    Procedure: LAPAROTOMY;  Surgeon: Jesse Blackman MD;  Location: [facility]       Family History:    Family History   Problem Relation Age of Onset    Cerebrovascular Disease Maternal Grandfather     Breast Cancer Maternal Grandfather     Asthma Sister     Diabetes Sister 14       Social History:  Marital Status:  Single [1]  Social History     Tobacco Use    Smoking status: Every Day     Current packs/day: 0.25     Types: Cigarettes    Smokeless tobacco: Never   Substance Use Topics    Alcohol use: Yes     Comment: 1-2x/month    Drug use: No        Medications:    cyclobenzaprine (FLEXERIL) 10 MG tablet  HYDROcodone-acetaminophen (NORCO) 5-325 MG tablet  acetaminophen (TYLENOL) 500 MG tablet  amphetamine-dextroamphetamine (ADDERALL) 20 MG tablet  blood glucose (CONTOUR NEXT TEST) test strip  Continuous Blood Gluc Sensor (DEXCOM G6 SENSOR) MISC  Continuous Blood Gluc Transmit (DEXCOM G6 TRANSMITTER) MISC  famotidine (PEPCID) 20 MG tablet  fluticasone (FLONASE) 50 MCG/ACT nasal spray  guaiFENesin-codeine (ROBITUSSIN AC) 100-10 MG/5ML solution  insulin aspart (NOVOLOG VIAL) 100 UNITS/ML vial  Insulin Glargine-yfgn 100 UNIT/ML SOPN  INSULIN PUMP - OUTPATIENT  KETOSTIX test strip  LORazepam (ATIVAN) 1 MG  "tablet  NOVOLOG FLEXPEN 100 UNIT/ML soln  QUEtiapine (SEROQUEL) 50 MG tablet  UBRELVY 100 MG tablet          Review of Systems  All other ROS reviewed and are negative or non-contributory except as stated in HPI.     Physical Exam   BP: (!) 143/93  Pulse: 86  Temp: 97.7  F (36.5  C)  Resp: 18  Height: 167.6 cm (5' 6\")  Weight: 68 kg (150 lb)  SpO2: 97 %      Physical Exam  Vitals and nursing note reviewed.   Constitutional:       Appearance: Normal appearance. She is normal weight.      Comments: Very pleasant, healthy-appearing female   HENT:      Head: Normocephalic.      Nose: Nose normal.      Mouth/Throat:      Mouth: Mucous membranes are moist.      Pharynx: Oropharynx is clear.   Eyes:      General: No scleral icterus.     Extraocular Movements: Extraocular movements intact.      Conjunctiva/sclera: Conjunctivae normal.   Cardiovascular:      Rate and Rhythm: Normal rate and regular rhythm.      Pulses: Normal pulses.      Heart sounds: Normal heart sounds.   Pulmonary:      Effort: Pulmonary effort is normal.      Breath sounds: Normal breath sounds.   Musculoskeletal:      Cervical back: Normal range of motion and neck supple.        Back:       Comments: Small low back surgical scar   Skin:     General: Skin is warm and dry.      Findings: No bruising or rash.   Neurological:      General: No focal deficit present.      Mental Status: She is alert.   Psychiatric:         Mood and Affect: Mood normal.         Behavior: Behavior normal.         ED Course (with Medical Decision Making)    Pt seen and examined by me.  RN and EPIC notes reviewed.      Patient with upper back pain.  This started after she was doing some stretching.  Possible disc issue, muscle spasm, other.  Lungs are clear.    I did order imaging of her thoracic spine.  This noted no acute significant abnormalities.  Reviewed by myself.    Plan to treat symptomatically.  Rest, ice or heat.  Lidocaine patches were placed and if these help she " can use Biofreeze or lidocaine patches at home.  Continue Tylenol as needed.  She was given small amount of Norco to use if needed for severe pain.  Flexeril for muscle spasm.  I recommend following up with her clinic provider for continued symptoms.  Return for worsening, changes or concerns.  Consider massage.        Procedures    Results for orders placed or performed during the hospital encounter of 05/19/25 (from the past 24 hours)   CT Thoracic Spine w/o Contrast    Narrative    EXAM: CT THORACIC SPINE W/O CONTRAST  LOCATION: [facility]  DATE: 5/19/2025    INDICATION: Acute upper thoracic back pain  COMPARISON: Chest CT 6/10/2024.  TECHNIQUE: Routine CT Thoracic Spine without IV contrast. Multiplanar reformats. Dose reduction techniques were used.     FINDINGS:  Alignment: Unremarkable.    Bones: Vertebral body heights are unremarkable. Inferior endplate contour irregularities at T10 and T11 are chronic and unchanged. No destructive bony lesions are apparent.    Discs: No large posterior disc abnormalities are demonstrated.    Spinal Canal: No high-grade stenosis. No findings to suggest epidural hematoma.    Neural foramina: No high-grade stenosis.    Paraspinal soft tissues: Unremarkable.    Thoracoabdominal cavity: No acute abnormality demonstrated within technique limitations.      Impression    IMPRESSION:  1.  No evidence of acute displaced fracture or malalignment.  2.  No evidence for high-grade spinal or neural foraminal stenosis.         Medications - No data to display    Assessments & Plan     I have reviewed the findings, diagnosis, plan and need for follow up with the patient.    Discharge Medication List as of 5/19/2025 10:23 AM        START taking these medications    Details   cyclobenzaprine (FLEXERIL) 10 MG tablet Take 0.5-1 tablets (5-10 mg) by mouth 3 times daily as needed for muscle spasms., Disp-30 tablet, R-0, E-Prescribe      HYDROcodone-acetaminophen  (NORCO) 5-325 MG tablet Take 1 tablet by mouth every 6 hours as needed for severe pain., Disp-12 tablet, R-0, E-Prescribe             Final diagnoses:   Acute upper back pain     Disposition: Patient discharged home in stable condition.  Plan as above.  Return for concerns.     Note: Chart documentation done in part with Dragon Voice Recognition software. Although reviewed after completion, some word and grammatical errors may remain.   5/19/2025   [facility] EMERGENCY DEPT       Va Melton MD  05/20/25 0117

## 2025-06-22 ENCOUNTER — HEALTH MAINTENANCE LETTER (OUTPATIENT)
Age: 40
End: 2025-06-22

## 2025-07-13 ENCOUNTER — HEALTH MAINTENANCE LETTER (OUTPATIENT)
Age: 40
End: 2025-07-13

## 2025-08-22 ENCOUNTER — HOSPITAL ENCOUNTER (OUTPATIENT)
Facility: CLINIC | Age: 40
Setting detail: OBSERVATION
Discharge: HOME | End: 2025-08-23
Attending: EMERGENCY MEDICINE | Admitting: INTERNAL MEDICINE
Payer: COMMERCIAL

## 2025-08-22 DIAGNOSIS — M54.50 ACUTE BILATERAL LOW BACK PAIN, UNSPECIFIED WHETHER SCIATICA PRESENT: ICD-10-CM

## 2025-08-22 DIAGNOSIS — R73.9 HYPERGLYCEMIA: Primary | ICD-10-CM

## 2025-08-22 DIAGNOSIS — E87.5 HYPERKALEMIA: ICD-10-CM

## 2025-08-22 PROBLEM — E10.10 DIABETIC KETOACIDOSIS WITHOUT COMA ASSOCIATED WITH TYPE 1 DIABETES MELLITUS (H): Status: ACTIVE | Noted: 2025-08-22

## 2025-08-22 LAB
ALBUMIN SERPL BCG-MCNC: 4.1 G/DL (ref 3.5–5.2)
ALBUMIN UR-MCNC: NEGATIVE MG/DL
ALP SERPL-CCNC: 111 U/L (ref 40–150)
ALT SERPL W P-5'-P-CCNC: 32 U/L (ref 0–50)
ANION GAP SERPL CALCULATED.3IONS-SCNC: 19 MMOL/L (ref 7–15)
APPEARANCE UR: CLEAR
AST SERPL W P-5'-P-CCNC: 43 U/L (ref 0–45)
B-OH-BUTYR SERPL-SCNC: 1.17 MMOL/L
BASE EXCESS BLDV CALC-SCNC: -3.4 MMOL/L (ref -3–3)
BASOPHILS # BLD AUTO: <0.03 10E3/UL (ref 0–0.2)
BASOPHILS NFR BLD AUTO: 0.2 %
BILIRUB SERPL-MCNC: <0.2 MG/DL
BILIRUB UR QL STRIP: NEGATIVE
BUN SERPL-MCNC: 25 MG/DL (ref 6–20)
CALCIUM SERPL-MCNC: 9 MG/DL (ref 8.8–10.4)
CHLORIDE SERPL-SCNC: 87 MMOL/L (ref 98–107)
COLOR UR AUTO: ABNORMAL
CREAT SERPL-MCNC: 0.73 MG/DL (ref 0.51–0.95)
EGFRCR SERPLBLD CKD-EPI 2021: >90 ML/MIN/1.73M2
EOSINOPHIL # BLD AUTO: <0.03 10E3/UL (ref 0–0.7)
EOSINOPHIL NFR BLD AUTO: 0 %
ERYTHROCYTE [DISTWIDTH] IN BLOOD BY AUTOMATED COUNT: 13.2 % (ref 10–15)
EST. AVERAGE GLUCOSE BLD GHB EST-MCNC: 212 MG/DL
GLUCOSE BLDC GLUCOMTR-MCNC: >600 MG/DL (ref 70–99)
GLUCOSE SERPL-MCNC: 1088 MG/DL (ref 70–99)
GLUCOSE UR STRIP-MCNC: >1000 MG/DL
HBA1C MFR BLD: 9 %
HCO3 BLDV-SCNC: 21 MMOL/L (ref 21–28)
HCO3 SERPL-SCNC: 17 MMOL/L (ref 22–29)
HCT VFR BLD AUTO: 40.3 % (ref 35–47)
HGB BLD-MCNC: 13.2 G/DL (ref 11.7–15.7)
HGB UR QL STRIP: NEGATIVE
IMM GRANULOCYTES # BLD: 0.03 10E3/UL
IMM GRANULOCYTES NFR BLD: 0.3 %
KETONES UR STRIP-MCNC: ABNORMAL MG/DL
LEUKOCYTE ESTERASE UR QL STRIP: NEGATIVE
LYMPHOCYTES # BLD AUTO: 0.47 10E3/UL (ref 0.8–5.3)
LYMPHOCYTES NFR BLD AUTO: 5.4 %
MCH RBC QN AUTO: 30.7 PG (ref 26.5–33)
MCHC RBC AUTO-ENTMCNC: 32.8 G/DL (ref 31.5–36.5)
MCV RBC AUTO: 93.7 FL (ref 78–100)
MONOCYTES # BLD AUTO: 0.07 10E3/UL (ref 0–1.3)
MONOCYTES NFR BLD AUTO: 0.8 %
NEUTROPHILS # BLD AUTO: 8.08 10E3/UL (ref 1.6–8.3)
NEUTROPHILS NFR BLD AUTO: 93.3 %
NITRATE UR QL: NEGATIVE
NRBC # BLD AUTO: <0.03 10E3/UL
NRBC BLD AUTO-RTO: 0 /100
O2/TOTAL GAS SETTING VFR VENT: 21 %
OXYHGB MFR BLDV: 86 % (ref 70–75)
PCO2 BLDV: 34 MM HG (ref 40–50)
PH BLDV: 7.39 [PH] (ref 7.32–7.43)
PH UR STRIP: 6 [PH] (ref 5–7)
PLATELET # BLD AUTO: 323 10E3/UL (ref 150–450)
PO2 BLDV: 56 MM HG (ref 25–47)
POTASSIUM SERPL-SCNC: 6.1 MMOL/L (ref 3.4–5.3)
PROT SERPL-MCNC: 7.2 G/DL (ref 6.4–8.3)
RBC # BLD AUTO: 4.3 10E6/UL (ref 3.8–5.2)
RBC URINE: 0 /HPF
SAO2 % BLDV: 90.6 % (ref 70–75)
SODIUM SERPL-SCNC: 123 MMOL/L (ref 135–145)
SP GR UR STRIP: 1.02 (ref 1–1.03)
UROBILINOGEN UR STRIP-MCNC: NORMAL MG/DL
WBC # BLD AUTO: 8.67 10E3/UL (ref 4–11)
WBC URINE: 0 /HPF

## 2025-08-22 PROCEDURE — 93005 ELECTROCARDIOGRAM TRACING: CPT

## 2025-08-22 PROCEDURE — 96372 THER/PROPH/DIAG INJ SC/IM: CPT | Performed by: EMERGENCY MEDICINE

## 2025-08-22 PROCEDURE — 99285 EMERGENCY DEPT VISIT HI MDM: CPT | Mod: 25 | Performed by: EMERGENCY MEDICINE

## 2025-08-22 PROCEDURE — 120N000001 HC R&B MED SURG/OB

## 2025-08-22 PROCEDURE — 81001 URINALYSIS AUTO W/SCOPE: CPT | Performed by: EMERGENCY MEDICINE

## 2025-08-22 PROCEDURE — 36415 COLL VENOUS BLD VENIPUNCTURE: CPT | Performed by: EMERGENCY MEDICINE

## 2025-08-22 PROCEDURE — 82962 GLUCOSE BLOOD TEST: CPT

## 2025-08-22 PROCEDURE — 96375 TX/PRO/DX INJ NEW DRUG ADDON: CPT

## 2025-08-22 PROCEDURE — 85025 COMPLETE CBC W/AUTO DIFF WBC: CPT | Performed by: EMERGENCY MEDICINE

## 2025-08-22 PROCEDURE — 120N000004 HC R&B MS OVERFLOW

## 2025-08-22 PROCEDURE — 250N000009 HC RX 250: Performed by: EMERGENCY MEDICINE

## 2025-08-22 PROCEDURE — 82010 KETONE BODYS QUAN: CPT | Performed by: EMERGENCY MEDICINE

## 2025-08-22 PROCEDURE — 84155 ASSAY OF PROTEIN SERUM: CPT | Performed by: EMERGENCY MEDICINE

## 2025-08-22 PROCEDURE — 258N000003 HC RX IP 258 OP 636: Performed by: EMERGENCY MEDICINE

## 2025-08-22 PROCEDURE — 250N000011 HC RX IP 250 OP 636: Performed by: EMERGENCY MEDICINE

## 2025-08-22 PROCEDURE — 96376 TX/PRO/DX INJ SAME DRUG ADON: CPT

## 2025-08-22 PROCEDURE — 83036 HEMOGLOBIN GLYCOSYLATED A1C: CPT | Performed by: EMERGENCY MEDICINE

## 2025-08-22 PROCEDURE — 82805 BLOOD GASES W/O2 SATURATION: CPT | Performed by: EMERGENCY MEDICINE

## 2025-08-22 RX ORDER — KETOROLAC TROMETHAMINE 15 MG/ML
15 INJECTION, SOLUTION INTRAMUSCULAR; INTRAVENOUS ONCE
Status: COMPLETED | OUTPATIENT
Start: 2025-08-22 | End: 2025-08-22

## 2025-08-22 RX ORDER — NICOTINE POLACRILEX 4 MG
15-30 LOZENGE BUCCAL
Status: DISCONTINUED | OUTPATIENT
Start: 2025-08-22 | End: 2025-08-23

## 2025-08-22 RX ORDER — DEXTROSE MONOHYDRATE 25 G/50ML
25-50 INJECTION, SOLUTION INTRAVENOUS
Status: DISCONTINUED | OUTPATIENT
Start: 2025-08-22 | End: 2025-08-23

## 2025-08-22 RX ORDER — DEXTROSE MONOHYDRATE 100 MG/ML
INJECTION, SOLUTION INTRAVENOUS CONTINUOUS PRN
Status: DISCONTINUED | OUTPATIENT
Start: 2025-08-22 | End: 2025-08-23

## 2025-08-22 RX ORDER — ORPHENADRINE CITRATE 30 MG/ML
60 INJECTION INTRAMUSCULAR; INTRAVENOUS ONCE
Status: COMPLETED | OUTPATIENT
Start: 2025-08-22 | End: 2025-08-22

## 2025-08-22 RX ADMIN — SODIUM CHLORIDE 1000 ML: 0.9 INJECTION, SOLUTION INTRAVENOUS at 23:00

## 2025-08-22 RX ADMIN — SODIUM CHLORIDE 1000 ML: 0.9 INJECTION, SOLUTION INTRAVENOUS at 21:31

## 2025-08-22 RX ADMIN — ORPHENADRINE CITRATE 60 MG: 60 INJECTION INTRAMUSCULAR; INTRAVENOUS at 23:00

## 2025-08-22 RX ADMIN — KETOROLAC TROMETHAMINE 15 MG: 15 INJECTION, SOLUTION INTRAMUSCULAR; INTRAVENOUS at 21:41

## 2025-08-22 RX ADMIN — INSULIN HUMAN 5.5 UNITS/HR: 1 INJECTION, SOLUTION INTRAVENOUS at 23:04

## 2025-08-22 ASSESSMENT — ACTIVITIES OF DAILY LIVING (ADL)
ADLS_ACUITY_SCORE: 46

## 2025-08-23 ENCOUNTER — APPOINTMENT (OUTPATIENT)
Dept: CT IMAGING | Facility: CLINIC | Age: 40
End: 2025-08-23
Attending: INTERNAL MEDICINE
Payer: COMMERCIAL

## 2025-08-23 VITALS
WEIGHT: 167.8 LBS | HEIGHT: 66 IN | HEART RATE: 82 BPM | TEMPERATURE: 98.1 F | SYSTOLIC BLOOD PRESSURE: 103 MMHG | RESPIRATION RATE: 14 BRPM | DIASTOLIC BLOOD PRESSURE: 59 MMHG | BODY MASS INDEX: 26.97 KG/M2 | OXYGEN SATURATION: 97 %

## 2025-08-23 PROBLEM — E10.10 DKA, TYPE 1, NOT AT GOAL (H): Status: ACTIVE | Noted: 2025-08-23

## 2025-08-23 LAB
ANION GAP SERPL CALCULATED.3IONS-SCNC: 13 MMOL/L (ref 7–15)
ANION GAP SERPL CALCULATED.3IONS-SCNC: 18 MMOL/L (ref 7–15)
ATRIAL RATE - MUSE: 85 BPM
B-OH-BUTYR SERPL-SCNC: <0.18 MMOL/L
B-OH-BUTYR SERPL-SCNC: <0.18 MMOL/L
BUN SERPL-MCNC: 20 MG/DL (ref 6–20)
BUN SERPL-MCNC: 23.7 MG/DL (ref 6–20)
CALCIUM SERPL-MCNC: 8.5 MG/DL (ref 8.8–10.4)
CALCIUM SERPL-MCNC: 8.7 MG/DL (ref 8.8–10.4)
CHLORIDE SERPL-SCNC: 100 MMOL/L (ref 98–107)
CHLORIDE SERPL-SCNC: 102 MMOL/L (ref 98–107)
CREAT SERPL-MCNC: 0.62 MG/DL (ref 0.51–0.95)
CREAT SERPL-MCNC: 0.71 MG/DL (ref 0.51–0.95)
DIASTOLIC BLOOD PRESSURE - MUSE: NORMAL MMHG
EGFRCR SERPLBLD CKD-EPI 2021: >90 ML/MIN/1.73M2
EGFRCR SERPLBLD CKD-EPI 2021: >90 ML/MIN/1.73M2
GLUCOSE BLDC GLUCOMTR-MCNC: 132 MG/DL (ref 70–99)
GLUCOSE BLDC GLUCOMTR-MCNC: 135 MG/DL (ref 70–99)
GLUCOSE BLDC GLUCOMTR-MCNC: 143 MG/DL (ref 70–99)
GLUCOSE BLDC GLUCOMTR-MCNC: 183 MG/DL (ref 70–99)
GLUCOSE BLDC GLUCOMTR-MCNC: 198 MG/DL (ref 70–99)
GLUCOSE BLDC GLUCOMTR-MCNC: 210 MG/DL (ref 70–99)
GLUCOSE BLDC GLUCOMTR-MCNC: 218 MG/DL (ref 70–99)
GLUCOSE BLDC GLUCOMTR-MCNC: 253 MG/DL (ref 70–99)
GLUCOSE BLDC GLUCOMTR-MCNC: 257 MG/DL (ref 70–99)
GLUCOSE BLDC GLUCOMTR-MCNC: 286 MG/DL (ref 70–99)
GLUCOSE BLDC GLUCOMTR-MCNC: 349 MG/DL (ref 70–99)
GLUCOSE BLDC GLUCOMTR-MCNC: 505 MG/DL (ref 70–99)
GLUCOSE SERPL-MCNC: 214 MG/DL (ref 70–99)
GLUCOSE SERPL-MCNC: 313 MG/DL (ref 70–99)
HCO3 SERPL-SCNC: 17 MMOL/L (ref 22–29)
HCO3 SERPL-SCNC: 20 MMOL/L (ref 22–29)
HOLD SPECIMEN: NORMAL
INTERPRETATION ECG - MUSE: NORMAL
P AXIS - MUSE: 45 DEGREES
PHOSPHATE SERPL-MCNC: 4.4 MG/DL (ref 2.5–4.5)
PHOSPHATE SERPL-MCNC: 4.6 MG/DL (ref 2.5–4.5)
POTASSIUM SERPL-SCNC: 4 MMOL/L (ref 3.4–5.3)
POTASSIUM SERPL-SCNC: 4.2 MMOL/L (ref 3.4–5.3)
PR INTERVAL - MUSE: 136 MS
QRS DURATION - MUSE: 92 MS
QT - MUSE: 360 MS
QTC - MUSE: 428 MS
R AXIS - MUSE: 66 DEGREES
SODIUM SERPL-SCNC: 135 MMOL/L (ref 135–145)
SODIUM SERPL-SCNC: 135 MMOL/L (ref 135–145)
SYSTOLIC BLOOD PRESSURE - MUSE: NORMAL MMHG
T AXIS - MUSE: 38 DEGREES
VENTRICULAR RATE- MUSE: 85 BPM

## 2025-08-23 PROCEDURE — 258N000002 HC RX IP 258 OP 250: Performed by: INTERNAL MEDICINE

## 2025-08-23 PROCEDURE — 80048 BASIC METABOLIC PNL TOTAL CA: CPT | Performed by: INTERNAL MEDICINE

## 2025-08-23 PROCEDURE — 36415 COLL VENOUS BLD VENIPUNCTURE: CPT | Performed by: INTERNAL MEDICINE

## 2025-08-23 PROCEDURE — 250N000012 HC RX MED GY IP 250 OP 636 PS 637: Performed by: INTERNAL MEDICINE

## 2025-08-23 PROCEDURE — 96366 THER/PROPH/DIAG IV INF ADDON: CPT

## 2025-08-23 PROCEDURE — 96376 TX/PRO/DX INJ SAME DRUG ADON: CPT

## 2025-08-23 PROCEDURE — 99207 PR NO BILLABLE SERVICE THIS VISIT: CPT | Performed by: INTERNAL MEDICINE

## 2025-08-23 PROCEDURE — 99223 1ST HOSP IP/OBS HIGH 75: CPT | Performed by: INTERNAL MEDICINE

## 2025-08-23 PROCEDURE — 96375 TX/PRO/DX INJ NEW DRUG ADDON: CPT

## 2025-08-23 PROCEDURE — 74176 CT ABD & PELVIS W/O CONTRAST: CPT

## 2025-08-23 PROCEDURE — 250N000013 HC RX MED GY IP 250 OP 250 PS 637: Performed by: INTERNAL MEDICINE

## 2025-08-23 PROCEDURE — 82010 KETONE BODYS QUAN: CPT | Performed by: INTERNAL MEDICINE

## 2025-08-23 PROCEDURE — 96365 THER/PROPH/DIAG IV INF INIT: CPT

## 2025-08-23 PROCEDURE — 250N000011 HC RX IP 250 OP 636: Performed by: INTERNAL MEDICINE

## 2025-08-23 PROCEDURE — G0378 HOSPITAL OBSERVATION PER HR: HCPCS

## 2025-08-23 PROCEDURE — 84100 ASSAY OF PHOSPHORUS: CPT | Performed by: INTERNAL MEDICINE

## 2025-08-23 PROCEDURE — 82962 GLUCOSE BLOOD TEST: CPT

## 2025-08-23 PROCEDURE — 258N000003 HC RX IP 258 OP 636: Performed by: INTERNAL MEDICINE

## 2025-08-23 RX ORDER — DEXTROSE MONOHYDRATE 25 G/50ML
25-50 INJECTION, SOLUTION INTRAVENOUS
Status: DISCONTINUED | OUTPATIENT
Start: 2025-08-23 | End: 2025-08-23

## 2025-08-23 RX ORDER — NICOTINE POLACRILEX 4 MG
15-30 LOZENGE BUCCAL
Status: DISCONTINUED | OUTPATIENT
Start: 2025-08-23 | End: 2025-08-23

## 2025-08-23 RX ORDER — NALOXONE HYDROCHLORIDE 0.4 MG/ML
0.2 INJECTION, SOLUTION INTRAMUSCULAR; INTRAVENOUS; SUBCUTANEOUS
Status: DISCONTINUED | OUTPATIENT
Start: 2025-08-23 | End: 2025-08-23 | Stop reason: HOSPADM

## 2025-08-23 RX ORDER — CYCLOBENZAPRINE HCL 10 MG
10 TABLET ORAL EVERY 8 HOURS PRN
Status: DISCONTINUED | OUTPATIENT
Start: 2025-08-23 | End: 2025-08-23 | Stop reason: HOSPADM

## 2025-08-23 RX ORDER — QUETIAPINE FUMARATE 25 MG/1
50 TABLET, FILM COATED ORAL AT BEDTIME
Status: DISCONTINUED | OUTPATIENT
Start: 2025-08-23 | End: 2025-08-23 | Stop reason: HOSPADM

## 2025-08-23 RX ORDER — NICOTINE POLACRILEX 4 MG
15-30 LOZENGE BUCCAL
Status: DISCONTINUED | OUTPATIENT
Start: 2025-08-23 | End: 2025-08-23 | Stop reason: HOSPADM

## 2025-08-23 RX ORDER — NALOXONE HYDROCHLORIDE 0.4 MG/ML
0.4 INJECTION, SOLUTION INTRAMUSCULAR; INTRAVENOUS; SUBCUTANEOUS
Status: DISCONTINUED | OUTPATIENT
Start: 2025-08-23 | End: 2025-08-23 | Stop reason: HOSPADM

## 2025-08-23 RX ORDER — SODIUM CHLORIDE 450 MG/100ML
INJECTION, SOLUTION INTRAVENOUS CONTINUOUS
Status: DISCONTINUED | OUTPATIENT
Start: 2025-08-23 | End: 2025-08-23 | Stop reason: ALTCHOICE

## 2025-08-23 RX ORDER — ACETAMINOPHEN 650 MG/1
650 SUPPOSITORY RECTAL EVERY 4 HOURS PRN
Status: DISCONTINUED | OUTPATIENT
Start: 2025-08-23 | End: 2025-08-23 | Stop reason: HOSPADM

## 2025-08-23 RX ORDER — DEXTROSE MONOHYDRATE 25 G/50ML
25-50 INJECTION, SOLUTION INTRAVENOUS
Status: DISCONTINUED | OUTPATIENT
Start: 2025-08-23 | End: 2025-08-23 | Stop reason: HOSPADM

## 2025-08-23 RX ORDER — HYDROMORPHONE HCL IN WATER/PF 6 MG/30 ML
0.4 PATIENT CONTROLLED ANALGESIA SYRINGE INTRAVENOUS
Status: DISCONTINUED | OUTPATIENT
Start: 2025-08-23 | End: 2025-08-23

## 2025-08-23 RX ORDER — ACETAMINOPHEN 325 MG/1
650 TABLET ORAL EVERY 4 HOURS PRN
Status: DISCONTINUED | OUTPATIENT
Start: 2025-08-23 | End: 2025-08-23 | Stop reason: HOSPADM

## 2025-08-23 RX ORDER — DEXTROSE MONOHYDRATE AND SODIUM CHLORIDE 5; .45 G/100ML; G/100ML
INJECTION, SOLUTION INTRAVENOUS CONTINUOUS
Status: DISCONTINUED | OUTPATIENT
Start: 2025-08-23 | End: 2025-08-23

## 2025-08-23 RX ORDER — HYDROMORPHONE HCL IN WATER/PF 6 MG/30 ML
0.2 PATIENT CONTROLLED ANALGESIA SYRINGE INTRAVENOUS
Status: DISCONTINUED | OUTPATIENT
Start: 2025-08-23 | End: 2025-08-23

## 2025-08-23 RX ADMIN — DEXTROSE AND SODIUM CHLORIDE: 5; .45 INJECTION, SOLUTION INTRAVENOUS at 07:13

## 2025-08-23 RX ADMIN — CYCLOBENZAPRINE 10 MG: 10 TABLET, FILM COATED ORAL at 02:15

## 2025-08-23 RX ADMIN — HYDROMORPHONE HYDROCHLORIDE 0.2 MG: 0.2 INJECTION, SOLUTION INTRAMUSCULAR; INTRAVENOUS; SUBCUTANEOUS at 07:15

## 2025-08-23 RX ADMIN — DEXTROSE AND SODIUM CHLORIDE: 5; .45 INJECTION, SOLUTION INTRAVENOUS at 03:19

## 2025-08-23 RX ADMIN — INSULIN GLARGINE 18 UNITS: 100 INJECTION, SOLUTION SUBCUTANEOUS at 09:17

## 2025-08-23 RX ADMIN — SODIUM CHLORIDE: 0.45 INJECTION, SOLUTION INTRAVENOUS at 00:16

## 2025-08-23 RX ADMIN — HYDROMORPHONE HYDROCHLORIDE 0.4 MG: 0.2 INJECTION, SOLUTION INTRAMUSCULAR; INTRAVENOUS; SUBCUTANEOUS at 02:16

## 2025-08-23 ASSESSMENT — ACTIVITIES OF DAILY LIVING (ADL)
ADLS_ACUITY_SCORE: 20

## (undated) DEVICE — DRSG PRIMAPORE 03 1/8X6" 66000318

## (undated) DEVICE — SPONGE LAP 18X18" 1515

## (undated) DEVICE — STOCKING SLEEVE COMPRESSION CALF MED

## (undated) DEVICE — SUCTION TIP POOLE K770

## (undated) DEVICE — DRSG PRIMAPORE 04X11 3/4"

## (undated) DEVICE — NDL ECLIPSE 25GA 1.5"

## (undated) DEVICE — SYR 50ML CATH TIP W/O NDL 309620

## (undated) DEVICE — PACK LAPAROSCOPY/PELVISCOPY STD

## (undated) DEVICE — Device

## (undated) DEVICE — ESU PENCIL SMOKE EVAC W/ROCKER SWITCH 0703-047-000

## (undated) DEVICE — SOL WATER IRRIG 1000ML BOTTLE 07139-09

## (undated) DEVICE — PREP CHLORAPREP 26ML TINTED ORANGE  260815

## (undated) DEVICE — GLOVE BIOGEL PI ULTRATOUCH G SZ 7.5 42175

## (undated) DEVICE — PACK GENERAL LAPAOSCOPY

## (undated) DEVICE — STPL SKIN 35W APPOSE 8886803712

## (undated) DEVICE — SU SILK 3-0 SH CR 8X18" C013D

## (undated) DEVICE — DRSG PRIMAPORE 02X3" 7133

## (undated) DEVICE — CATH TRAY FOLEY 16FR SIL

## (undated) DEVICE — BLADE KNIFE SURG 10 371110

## (undated) DEVICE — SOL NACL 0.9% IRRIG 1000ML BOTTLE 07138-09

## (undated) DEVICE — SU VICRYL 3-0 TIE 12X18" UND J110T

## (undated) DEVICE — ESU LIGASURE IMPACT OPEN SEALER/DVDR CVD LG JAW LF4418

## (undated) DEVICE — SUCTION MANIFOLD NEPTUNE 2 SYS 4 PORT 0702-020-000

## (undated) DEVICE — DRAPE LAP W/ARMBOARD 29410

## (undated) DEVICE — ENDO TROCAR 05MM VERSAPORT BLADED W/FIX CANNULA 179094F

## (undated) DEVICE — SU PDS II 1 CT-1 MONOFIL Z347H

## (undated) DEVICE — SU VICRYL 2-0 TIE 12X18" UND J111T

## (undated) DEVICE — SU VICRYL 2-0 SH 27" J317H

## (undated) RX ORDER — FENTANYL CITRATE-0.9 % NACL/PF 10 MCG/ML
PLASTIC BAG, INJECTION (ML) INTRAVENOUS
Status: DISPENSED
Start: 2023-05-19

## (undated) RX ORDER — FENTANYL CITRATE 50 UG/ML
INJECTION, SOLUTION INTRAMUSCULAR; INTRAVENOUS
Status: DISPENSED
Start: 2023-05-19

## (undated) RX ORDER — HYDROMORPHONE HYDROCHLORIDE 1 MG/ML
INJECTION, SOLUTION INTRAMUSCULAR; INTRAVENOUS; SUBCUTANEOUS
Status: DISPENSED
Start: 2023-05-19

## (undated) RX ORDER — BUPIVACAINE HYDROCHLORIDE 2.5 MG/ML
INJECTION, SOLUTION EPIDURAL; INFILTRATION; INTRACAUDAL
Status: DISPENSED
Start: 2023-05-19

## (undated) RX ORDER — DIMENHYDRINATE 50 MG/ML
INJECTION, SOLUTION INTRAMUSCULAR; INTRAVENOUS
Status: DISPENSED
Start: 2023-05-19